# Patient Record
Sex: FEMALE | Race: WHITE | NOT HISPANIC OR LATINO | Employment: OTHER | URBAN - NONMETROPOLITAN AREA
[De-identification: names, ages, dates, MRNs, and addresses within clinical notes are randomized per-mention and may not be internally consistent; named-entity substitution may affect disease eponyms.]

---

## 2017-04-25 ENCOUNTER — OFFICE VISIT (OUTPATIENT)
Dept: FAMILY MEDICINE CLINIC | Facility: CLINIC | Age: 72
End: 2017-04-25

## 2017-04-25 VITALS
WEIGHT: 92 LBS | DIASTOLIC BLOOD PRESSURE: 65 MMHG | TEMPERATURE: 99.4 F | BODY MASS INDEX: 19.31 KG/M2 | HEART RATE: 95 BPM | OXYGEN SATURATION: 93 % | SYSTOLIC BLOOD PRESSURE: 115 MMHG | HEIGHT: 58 IN

## 2017-04-25 DIAGNOSIS — R30.0 DYSURIA: Primary | ICD-10-CM

## 2017-04-25 LAB
BACTERIA UR QL AUTO: ABNORMAL /HPF
BILIRUB BLD-MCNC: NEGATIVE MG/DL
BILIRUB UR QL STRIP: NEGATIVE
CLARITY UR: CLEAR
CLARITY, POC: ABNORMAL
COLOR UR: YELLOW
COLOR UR: YELLOW
GLUCOSE UR STRIP-MCNC: NEGATIVE MG/DL
GLUCOSE UR STRIP-MCNC: NEGATIVE MG/DL
HGB UR QL STRIP.AUTO: NEGATIVE
HYALINE CASTS UR QL AUTO: ABNORMAL /LPF
KETONES UR QL STRIP: NEGATIVE
KETONES UR QL: NEGATIVE
LEUKOCYTE EST, POC: NEGATIVE
LEUKOCYTE ESTERASE UR QL STRIP.AUTO: NEGATIVE
NITRITE UR QL STRIP: NEGATIVE
NITRITE UR-MCNC: NEGATIVE MG/ML
PH UR STRIP.AUTO: 6 [PH] (ref 5–8)
PH UR: 6 [PH] (ref 5–8)
PROT UR QL STRIP: NEGATIVE
PROT UR STRIP-MCNC: NEGATIVE MG/DL
RBC # UR STRIP: NEGATIVE /UL
RBC # UR: ABNORMAL /HPF
REF LAB TEST METHOD: ABNORMAL
SP GR UR STRIP: 1.01 (ref 1–1.03)
SP GR UR: 1.02 (ref 1–1.03)
SQUAMOUS #/AREA URNS HPF: ABNORMAL /HPF
UROBILINOGEN UR QL STRIP: NORMAL
UROBILINOGEN UR QL: NORMAL
WBC UR QL AUTO: ABNORMAL /HPF

## 2017-04-25 PROCEDURE — 99213 OFFICE O/P EST LOW 20 MIN: CPT | Performed by: NURSE PRACTITIONER

## 2017-04-25 PROCEDURE — 81001 URINALYSIS AUTO W/SCOPE: CPT | Performed by: NURSE PRACTITIONER

## 2017-04-25 PROCEDURE — 87086 URINE CULTURE/COLONY COUNT: CPT | Performed by: NURSE PRACTITIONER

## 2017-04-25 PROCEDURE — 81003 URINALYSIS AUTO W/O SCOPE: CPT | Performed by: NURSE PRACTITIONER

## 2017-04-25 RX ORDER — POTASSIUM CHLORIDE 750 MG/1
TABLET, EXTENDED RELEASE ORAL
Refills: 6 | COMMUNITY
Start: 2017-03-21 | End: 2018-01-22

## 2017-04-25 RX ORDER — OMEPRAZOLE 20 MG/1
20 CAPSULE, DELAYED RELEASE ORAL DAILY
Refills: 2 | COMMUNITY
Start: 2017-02-10 | End: 2020-07-21 | Stop reason: SDUPTHER

## 2017-04-25 RX ORDER — AMLODIPINE BESYLATE 5 MG/1
TABLET ORAL
Refills: 6 | COMMUNITY
Start: 2017-03-21 | End: 2017-12-29 | Stop reason: ALTCHOICE

## 2017-04-25 RX ORDER — METOPROLOL SUCCINATE 50 MG/1
50 TABLET, EXTENDED RELEASE ORAL 2 TIMES DAILY
Refills: 6 | COMMUNITY
Start: 2017-03-21 | End: 2021-07-22 | Stop reason: SDUPTHER

## 2017-04-25 RX ORDER — FUROSEMIDE 20 MG/1
20 TABLET ORAL DAILY
Refills: 6 | COMMUNITY
Start: 2017-03-21 | End: 2021-07-22

## 2017-04-25 RX ORDER — SULFAMETHOXAZOLE AND TRIMETHOPRIM 400; 80 MG/1; MG/1
1 TABLET ORAL 2 TIMES DAILY
Qty: 20 TABLET | Refills: 0 | Status: SHIPPED | OUTPATIENT
Start: 2017-04-25 | End: 2018-01-22

## 2017-04-25 RX ORDER — ACETAMINOPHEN 325 MG/1
650 TABLET ORAL EVERY 6 HOURS PRN
Qty: 90 TABLET | Refills: 0 | Status: ON HOLD | OUTPATIENT
Start: 2017-04-25 | End: 2019-07-29

## 2017-04-25 RX ORDER — PHENAZOPYRIDINE HYDROCHLORIDE 100 MG/1
100 TABLET, FILM COATED ORAL 3 TIMES DAILY PRN
Qty: 15 TABLET | Refills: 0 | Status: SHIPPED | OUTPATIENT
Start: 2017-04-25 | End: 2018-01-22

## 2017-04-25 NOTE — PROGRESS NOTES
"Subjective   Ale Gupta is a 71 y.o. female.     Chief Complaint   Patient presents with   • Dysuria       History of Present Illness     Dysuria-ongoing for several weeks.  LBP on the right and burning.  Has been lifting also due to moving some furniture.  Some low grade temp and chills.  Not at goal.     The following portions of the patient's history were reviewed and updated as appropriate: allergies, current medications, past family history, past medical history, past social history, past surgical history and problem list.    Review of Systems   Constitutional: Positive for chills and fever. Negative for appetite change.   Gastrointestinal: Positive for abdominal pain and nausea. Negative for constipation, diarrhea and vomiting.   Genitourinary: Positive for dysuria and flank pain (right sided). Negative for decreased urine volume and difficulty urinating.   Musculoskeletal: Positive for back pain.   All other systems reviewed and are negative.      Objective     /65 (BP Location: Left arm, Patient Position: Standing)  Pulse 95  Temp 99.4 °F (37.4 °C) (Tympanic)   Ht 58\" (147.3 cm)  Wt 92 lb (41.7 kg)  SpO2 93%  BMI 19.23 kg/m2    Physical Exam   Constitutional: She is oriented to person, place, and time. She appears well-developed and well-nourished. No distress.   HENT:   Head: Normocephalic and atraumatic.   Right Ear: External ear normal.   Left Ear: External ear normal.   Nose: Nose normal.   Mouth/Throat: Oropharynx is clear and moist.   Eyes: Conjunctivae, EOM and lids are normal. Pupils are equal, round, and reactive to light. No scleral icterus. Right eye exhibits normal extraocular motion and no nystagmus. Left eye exhibits normal extraocular motion and no nystagmus.   Neck: Normal range of motion. Neck supple. No JVD present. No tracheal tenderness present. No thyromegaly present.   Cardiovascular: Normal rate, regular rhythm, normal heart sounds and intact distal pulses.    No murmur " heard.  Pulmonary/Chest: Effort normal and breath sounds normal. She exhibits no tenderness.   Abdominal: Soft. Bowel sounds are normal. She exhibits no mass. There is no hepatosplenomegaly. There is tenderness in the suprapubic area. There is CVA tenderness (right).   Musculoskeletal: Normal range of motion. She exhibits no edema or tenderness.   Gait normal.   equal bilaterally. No muscular atrophy or flaccidity.   Lymphadenopathy:   No palpable nodes   Neurological: She is alert and oriented to person, place, and time. She has normal strength and normal reflexes. She displays no tremor. No cranial nerve deficit or sensory deficit. She exhibits normal muscle tone. Coordination and gait normal.   Skin: Skin is warm and dry.   Psychiatric: She has a normal mood and affect. Her behavior is normal. Judgment and thought content normal.   Vitals reviewed.      Assessment/Plan     Ale was seen today for dysuria.    Diagnoses and all orders for this visit:    Dysuria  -     POC Urinalysis Dipstick, Automated  -     Urine Culture  -     Urinalysis With Microscopic  -     sulfamethoxazole-trimethoprim (BACTRIM,SEPTRA) 400-80 MG tablet; Take 1 tablet by mouth 2 (Two) Times a Day.  -     phenazopyridine (PYRIDIUM) 100 MG tablet; Take 1 tablet by mouth 3 (Three) Times a Day As Needed for bladder spasms.  -     acetaminophen (TYLENOL) 325 MG tablet; Take 2 tablets by mouth Every 6 (Six) Hours As Needed for Mild Pain (1-3).  -     Urinalysis  -     Urinalysis, Microscopic Only    Urine culture ordered. Will change antibiotics if not susceptible to currently prescribed medication.   Understands disease processes and need for medications.  Understands reasons for urgent and emergent care.  Patient (& family) verbalized agreement for treatment plan.   Instructed to complete all of antibiotics for acute illness.  Increase PO fluids, avoid caffeine.  Do not save meds for later use.  Rest PRN  RTC PRN 3-5 days for worsening or  non resolving symptoms

## 2017-04-28 LAB — BACTERIA SPEC AEROBE CULT: NORMAL

## 2017-12-04 RX ORDER — FLUTICASONE PROPIONATE 50 MCG
SPRAY, SUSPENSION (ML) NASAL
Qty: 16 G | Refills: 4 | Status: SHIPPED | OUTPATIENT
Start: 2017-12-04 | End: 2019-02-06 | Stop reason: SDUPTHER

## 2017-12-14 ENCOUNTER — OFFICE VISIT (OUTPATIENT)
Dept: ORTHOPEDIC SURGERY | Facility: CLINIC | Age: 72
End: 2017-12-14

## 2017-12-14 ENCOUNTER — HOSPITAL ENCOUNTER (OUTPATIENT)
Dept: GENERAL RADIOLOGY | Facility: HOSPITAL | Age: 72
Discharge: HOME | End: 2017-12-14
Attending: ORTHOPAEDIC SURGERY | Admitting: ORTHOPAEDIC SURGERY

## 2017-12-14 VITALS
SYSTOLIC BLOOD PRESSURE: 140 MMHG | DIASTOLIC BLOOD PRESSURE: 49 MMHG | HEIGHT: 55 IN | BODY MASS INDEX: 18.52 KG/M2 | WEIGHT: 80 LBS | HEART RATE: 63 BPM

## 2017-12-14 DIAGNOSIS — S82.841A CLOSED BIMALLEOLAR FRACTURE OF RIGHT ANKLE, INITIAL ENCOUNTER: Primary | ICD-10-CM

## 2017-12-14 PROCEDURE — 73610 X-RAY EXAM OF ANKLE: CPT | Performed by: RADIOLOGY

## 2017-12-14 PROCEDURE — 73610 X-RAY EXAM OF ANKLE: CPT

## 2017-12-14 PROCEDURE — 27808 TREATMENT OF ANKLE FRACTURE: CPT | Performed by: ORTHOPAEDIC SURGERY

## 2017-12-14 RX ORDER — LISINOPRIL 10 MG/1
10 TABLET ORAL 2 TIMES DAILY
Status: ON HOLD | COMMUNITY
End: 2019-07-29

## 2017-12-14 NOTE — PROGRESS NOTES
New Patient Visit        Patient: Ale Gupta  YOB: 1945  Date of encounter: 12/14/2017      History of Present Illness:   Ale Gupta is a 72 y.o.  female who is referred here today by [facility] emergency room for evaluation of acute injury to her right ankle.  She states that a few nights ago she woke up in the evening and as she got up she went to take a step and she fell landing on the right ankle.  She complains of pain and swelling that occurred immediately following the fall.  She also adds that she heard a pop in her ankle when she fell.  She denies paresthesias.  She was placed in a posterior splint will in the emergency room.  She has been getting around in a wheelchair and not bearing any weight onto the leg.      PMH:   There is no problem list on file for this patient.    Past Medical History:   Diagnosis Date   • Congestive heart failure (CHF)    • Hypertension        PSH:  Past Surgical History:   Procedure Laterality Date   • CATARACT EXTRACTION     • HERNIA REPAIR         Allergies:     Allergies   Allergen Reactions   • Codeine        Medications:     Current Outpatient Prescriptions:   •  acetaminophen (TYLENOL) 325 MG tablet, Take 2 tablets by mouth Every 6 (Six) Hours As Needed for Mild Pain (1-3)., Disp: 90 tablet, Rfl: 0  •  amLODIPine (NORVASC) 5 MG tablet, , Disp: , Rfl: 6  •  fluticasone (FLONASE) 50 MCG/ACT nasal spray, DIRECTIONS NOT FOUND - SEE HARD COPY, Disp: 16 g, Rfl: 4  •  furosemide (LASIX) 20 MG tablet, , Disp: , Rfl: 6  •  lisinopril (PRINIVIL,ZESTRIL) 10 MG tablet, Take 10 mg by mouth 2 (Two) Times a Day., Disp: , Rfl:   •  metoprolol succinate XL (TOPROL-XL) 50 MG 24 hr tablet, , Disp: , Rfl: 6  •  omeprazole (priLOSEC) 20 MG capsule, , Disp: , Rfl: 2  •  phenazopyridine (PYRIDIUM) 100 MG tablet, Take 1 tablet by mouth 3 (Three) Times a Day As Needed for bladder spasms., Disp: 15 tablet, Rfl: 0  •  potassium chloride (K-DUR,KLOR-CON) 10 MEQ CR tablet, , Disp: ,  "Rfl: 6  •  SPIRIVA HANDIHALER 18 MCG per inhalation capsule, INHALE CONTENTS OF 1 CAPSULE DAILY, Disp: 30 capsule, Rfl: 5  •  sulfamethoxazole-trimethoprim (BACTRIM,SEPTRA) 400-80 MG tablet, Take 1 tablet by mouth 2 (Two) Times a Day., Disp: 20 tablet, Rfl: 0  •  SYMBICORT 160-4.5 MCG/ACT inhaler, DIRECTIONS NOT FOUND - SEE HARD COPY, Disp: 13686 g, Rfl: 5    Social History:  Social History     Social History   • Marital status: Single     Spouse name: N/A   • Number of children: N/A   • Years of education: N/A     Occupational History   • Not on file.     Social History Main Topics   • Smoking status: Former Smoker   • Smokeless tobacco: Never Used   • Alcohol use No   • Drug use: No   • Sexual activity: Defer     Other Topics Concern   • Not on file     Social History Narrative       Family History:     Family History   Problem Relation Age of Onset   • Heart disease Father    • Heart disease Sister    • Hypertension Sister    • Cancer Brother    • Heart disease Maternal Grandmother    • No Known Problems Maternal Grandfather        Review of Systems:   Review of Systems   HENT: Negative.    Eyes: Negative.    Respiratory: Negative.    Cardiovascular: Negative.    Gastrointestinal: Negative.    Endocrine: Negative.    Genitourinary: Negative.    Musculoskeletal:        Pertinent positives mentioned in HPI   Skin: Negative.    Neurological: Positive for weakness.   Hematological: Bruises/bleeds easily.   Psychiatric/Behavioral: Negative.        Physical Exam: 72 y.o. female  General Appearance:    Alert and oriented x 3, cooperative, in no acute distress                   Vitals:    12/14/17 0820   BP: 140/49   BP Location: Right arm   Patient Position: Sitting   Pulse: 63   Weight: 36.3 kg (80 lb)   Height: 60 cm (23.62\")                Musculoskeletal: Examination of the right ankle reveals moderate swelling and ecchymosis.  She has moderate tenderness along the distal fibula and tibia.  Her range of motion is " not tested secondary to known fracture.  Her neurovascular status is grossly intact.      Radiology:     3 views of the right ankle were reviewed revealing a bimalleolar fracture with minimal displacement.      Assessment    ICD-10-CM ICD-9-CM   1. Closed bimalleolar fracture of right ankle, initial encounter S82.841A 824.4       Plan:    a 72-year-old female with acute injury to her right ankle.  X-rays today reveal a minimally displaced bimalleolar fracture.  Today she was placed in a short leg fiberglass cast.  She and her sister were instructed on cast care.  She was given a prescription for a walker and she can ambulate with toe-touch weightbearing status.  We will work on getting home health out to help her with gait training and ambulation with a walker.  She'll return back in 2 weeks for repeat x-rays in cast.    Written by, Ximena ADAMS, acting as a scribe for Dr. Hwang

## 2017-12-15 ENCOUNTER — TELEPHONE (OUTPATIENT)
Dept: ORTHOPEDIC SURGERY | Facility: CLINIC | Age: 72
End: 2017-12-15

## 2017-12-15 NOTE — TELEPHONE ENCOUNTER
Contacted [facility] phone [phone] fax [phone]. Referral for HH PT for gait training with a walker, patients sister also called this morning requesting a wheel chair,   HH was notified and sister was notified with the Rx for the wheel chair.

## 2017-12-28 DIAGNOSIS — S82.841D CLOSED BIMALLEOLAR FRACTURE OF RIGHT ANKLE WITH ROUTINE HEALING, SUBSEQUENT ENCOUNTER: Primary | ICD-10-CM

## 2017-12-29 ENCOUNTER — OFFICE VISIT (OUTPATIENT)
Dept: ORTHOPEDIC SURGERY | Facility: CLINIC | Age: 72
End: 2017-12-29

## 2017-12-29 ENCOUNTER — HOSPITAL ENCOUNTER (OUTPATIENT)
Dept: GENERAL RADIOLOGY | Facility: HOSPITAL | Age: 72
Discharge: HOME | End: 2017-12-29
Attending: ORTHOPAEDIC SURGERY | Admitting: ORTHOPAEDIC SURGERY

## 2017-12-29 VITALS — HEIGHT: 55 IN | BODY MASS INDEX: 18.52 KG/M2 | WEIGHT: 80 LBS

## 2017-12-29 DIAGNOSIS — S82.841D CLOSED BIMALLEOLAR FRACTURE OF RIGHT ANKLE WITH ROUTINE HEALING, SUBSEQUENT ENCOUNTER: Primary | ICD-10-CM

## 2017-12-29 DIAGNOSIS — S82.841D CLOSED BIMALLEOLAR FRACTURE OF RIGHT ANKLE WITH ROUTINE HEALING, SUBSEQUENT ENCOUNTER: ICD-10-CM

## 2017-12-29 PROBLEM — S82.841A CLOSED BIMALLEOLAR FRACTURE OF RIGHT ANKLE: Status: ACTIVE | Noted: 2017-12-29

## 2017-12-29 PROCEDURE — 73600 X-RAY EXAM OF ANKLE: CPT

## 2017-12-29 PROCEDURE — 99024 POSTOP FOLLOW-UP VISIT: CPT | Performed by: ORTHOPAEDIC SURGERY

## 2017-12-29 PROCEDURE — 73610 X-RAY EXAM OF ANKLE: CPT | Performed by: RADIOLOGY

## 2017-12-29 RX ORDER — POTASSIUM CHLORIDE 750 MG/1
10 TABLET, FILM COATED, EXTENDED RELEASE ORAL DAILY
COMMUNITY
Start: 2017-12-04 | End: 2021-07-22 | Stop reason: SDUPTHER

## 2017-12-29 RX ORDER — AMLODIPINE BESYLATE 10 MG/1
10 TABLET ORAL DAILY
COMMUNITY
Start: 2017-12-22 | End: 2021-07-22

## 2017-12-29 NOTE — PROGRESS NOTES
"Patient: Ale Gupta    YOB: 1945        History of Present Illness: Patient presents for follow-up approximately 2 and half weeks status post a minimally displaced bimalleolar right ankle fracture.  Does states she gets some intermittent problems with pain but no specific complaints this morning.  Denies any paresthesias.  Does use a walker to get around somewhat in the house.    Past Medical History:   Diagnosis Date   • Congestive heart failure (CHF)    • Hypertension         Social History     Social History   • Marital status: Single     Spouse name: N/A   • Number of children: N/A   • Years of education: N/A     Occupational History   • Not on file.     Social History Main Topics   • Smoking status: Former Smoker   • Smokeless tobacco: Never Used   • Alcohol use No   • Drug use: No   • Sexual activity: Defer     Other Topics Concern   • Not on file     Social History Narrative           Physical Exam: 72 y.o. female  General Appearance:    Alert and oriented x 3, cooperative, in no acute distress                   Vitals:    12/29/17 1004   Weight: 36.3 kg (80 lb)   Height: 60 cm (23.62\")          Exam shows the cast is intact.  There is no wear on the bottom of it noted.  Right foot's grossly neurovascularly intact in the toes without significant swelling      Radiology:       X-ray shows no significant change in alignment of the fractures.  The mortise is intact and looks good      Assessment/Plan:    Healing bimalleolar right ankle fracture.  Plan is times to continue touchdown weightbearing with the cast.  I did give her some prescription for Tylenol 325 mg 1 or 2 tablets every 6 hours for pain.  Also some Motrin 200 mg 1 or 2 every 6 hours for pain.  We'll see her back in approximately 4 weeks for x-rays out of cast      Discussion/Summary:        This chart was completed utilizing the dragon speech recognition software.  Grammatical errors, random word insertions, pronoun errors, and " incomplete sentences or occasional consequences of the system due to software limitations, ambient noise, and hardware issues.  Any questions or concerns about the content, text, or information contained within the body of this dictation should be directly addressed to the physician for clarification        This document was signed by Justin Hwang M.D. December 29, 2017 10:28 AM

## 2018-01-22 ENCOUNTER — OFFICE VISIT (OUTPATIENT)
Dept: FAMILY MEDICINE CLINIC | Facility: CLINIC | Age: 73
End: 2018-01-22

## 2018-01-22 VITALS
HEIGHT: 55 IN | TEMPERATURE: 98.4 F | HEART RATE: 81 BPM | SYSTOLIC BLOOD PRESSURE: 108 MMHG | OXYGEN SATURATION: 95 % | DIASTOLIC BLOOD PRESSURE: 60 MMHG

## 2018-01-22 DIAGNOSIS — R68.89 FLU-LIKE SYMPTOMS: ICD-10-CM

## 2018-01-22 DIAGNOSIS — K21.9 GASTROESOPHAGEAL REFLUX DISEASE WITHOUT ESOPHAGITIS: ICD-10-CM

## 2018-01-22 DIAGNOSIS — I10 ESSENTIAL HYPERTENSION: ICD-10-CM

## 2018-01-22 DIAGNOSIS — R32 URINARY INCONTINENCE, UNSPECIFIED TYPE: ICD-10-CM

## 2018-01-22 DIAGNOSIS — J10.1 INFLUENZA A: Primary | ICD-10-CM

## 2018-01-22 DIAGNOSIS — J41.8 MIXED SIMPLE AND MUCOPURULENT CHRONIC BRONCHITIS (HCC): ICD-10-CM

## 2018-01-22 PROBLEM — J30.9 CHRONIC ALLERGIC RHINITIS: Status: ACTIVE | Noted: 2018-01-22

## 2018-01-22 LAB
EXPIRATION DATE: ABNORMAL
FLUAV AG NPH QL: POSITIVE
FLUBV AG NPH QL: NEGATIVE
INTERNAL CONTROL: ABNORMAL
Lab: ABNORMAL

## 2018-01-22 PROCEDURE — 99214 OFFICE O/P EST MOD 30 MIN: CPT | Performed by: PHYSICIAN ASSISTANT

## 2018-01-22 PROCEDURE — 87804 INFLUENZA ASSAY W/OPTIC: CPT | Performed by: PHYSICIAN ASSISTANT

## 2018-01-22 RX ORDER — OSELTAMIVIR PHOSPHATE 75 MG/1
75 CAPSULE ORAL 2 TIMES DAILY
Qty: 10 CAPSULE | Refills: 5 | Status: ON HOLD | OUTPATIENT
Start: 2018-01-22 | End: 2019-07-29

## 2018-01-22 RX ORDER — ALBUTEROL SULFATE 2.5 MG/3ML
2.5 SOLUTION RESPIRATORY (INHALATION) EVERY 4 HOURS PRN
Qty: 120 ML | Refills: 5 | Status: SHIPPED | OUTPATIENT
Start: 2018-01-22 | End: 2019-02-06 | Stop reason: SDUPTHER

## 2018-01-22 RX ORDER — OXYBUTYNIN CHLORIDE 5 MG/1
5 TABLET ORAL 3 TIMES DAILY
Qty: 30 TABLET | Refills: 5 | Status: ON HOLD | OUTPATIENT
Start: 2018-01-22 | End: 2019-07-29

## 2018-01-22 RX ORDER — BUDESONIDE AND FORMOTEROL FUMARATE DIHYDRATE 160; 4.5 UG/1; UG/1
2 AEROSOL RESPIRATORY (INHALATION)
Qty: 10.2 G | Refills: 5 | Status: SHIPPED | OUTPATIENT
Start: 2018-01-22 | End: 2019-02-06 | Stop reason: SDUPTHER

## 2018-01-22 NOTE — PROGRESS NOTES
Subjective   Ale Gupta is a 72 y.o. female.     Chief complaint: Chills    History of Present Illness     Chills-  She is here today with her friend Lea Andrews.  She complains of body aches chills, cough, and fatigue for the past 2 days.    Rapid influenza testing in office today was positive for influenza A.    COPD-not at goal due to acute illness    Urinary leaking-  She complains of leaking urine before she can reach the bathroom intermittently throughout the day.  Onset greater than 6 months ago.    Hypertension-well controlled    Gastroesophageal reflux disease-well controlled    The following portions of the patient's history were reviewed and updated as appropriate: allergies, current medications, past family history, past medical history, past social history, past surgical history and problem list.    Review of Systems   Constitutional: Positive for chills and fatigue. Negative for activity change, appetite change and fever.   HENT: Positive for congestion. Negative for ear pain, sinus pressure and sore throat.    Eyes: Negative for pain and visual disturbance.   Respiratory: Positive for cough. Negative for chest tightness.    Cardiovascular: Negative for chest pain and palpitations.   Gastrointestinal: Negative for abdominal pain, constipation, diarrhea, nausea and vomiting.   Endocrine: Negative for polydipsia and polyuria.   Genitourinary: Negative for dysuria and frequency.   Musculoskeletal: Positive for arthralgias. Negative for back pain and myalgias.   Skin: Negative for color change and rash.   Allergic/Immunologic: Negative for food allergies and immunocompromised state.   Neurological: Negative for dizziness, syncope and headaches.   Hematological: Negative for adenopathy. Does not bruise/bleed easily.   Psychiatric/Behavioral: Negative for hallucinations and suicidal ideas. The patient is not nervous/anxious.        /60 (BP Location: Left arm, Patient Position: Sitting, Cuff Size:  "Adult)  Pulse 81  Temp 98.4 °F (36.9 °C) (Oral)   Ht 60 cm (23.62\")  SpO2 95%    Physical Exam   Constitutional: She is oriented to person, place, and time. She appears well-developed and well-nourished.   HENT:   Head: Normocephalic and atraumatic.   Nose: Nose normal.   Mouth/Throat: Oropharynx is clear and moist.   Eyes: Conjunctivae and EOM are normal. Pupils are equal, round, and reactive to light.   Neck: Normal range of motion. Neck supple. No tracheal deviation present. No thyromegaly present.   Cardiovascular: Normal rate, regular rhythm, normal heart sounds and intact distal pulses.    No murmur heard.  Pulmonary/Chest: Effort normal and breath sounds normal. No respiratory distress. She has no wheezes.   Abdominal: Soft. Bowel sounds are normal. There is no tenderness. There is no guarding.   Musculoskeletal: She exhibits tenderness. She exhibits no edema.   Patient is using walker to ambulate today.  Patient is not bearing weight on right ankle which is in a cast due to recent ankle fracture.   Lymphadenopathy:     She has no cervical adenopathy.   Neurological: She is alert and oriented to person, place, and time.   Skin: Skin is warm and dry. No rash noted.   Psychiatric: She has a normal mood and affect. Her behavior is normal.   Nursing note and vitals reviewed.      Assessment/Plan     Diagnoses and all orders for this visit:    Influenza A  -     oseltamivir (TAMIFLU) 75 MG capsule; Take 1 capsule by mouth 2 (Two) Times a Day.    Flu-like symptoms  -     POCT Influenza A/B    Mixed simple and mucopurulent chronic bronchitis  -     albuterol (PROVENTIL) (2.5 MG/3ML) 0.083% nebulizer solution; Take 2.5 mg by nebulization Every 4 (Four) Hours As Needed for Wheezing.  -     budesonide-formoterol (SYMBICORT) 160-4.5 MCG/ACT inhaler; Inhale 2 puffs 2 (Two) Times a Day.    Urinary incontinence, unspecified type  Comments:  start oxybutynin  Orders:  -     oxybutynin (DITROPAN) 5 MG tablet; Take 1 " tablet by mouth 3 (Three) Times a Day.    Essential hypertension    Gastroesophageal reflux disease without esophagitis                 This document has been electronically signed by:  Ketty Miranda PA-C

## 2018-01-23 DIAGNOSIS — S82.841D CLOSED BIMALLEOLAR FRACTURE OF RIGHT ANKLE WITH ROUTINE HEALING, SUBSEQUENT ENCOUNTER: Primary | ICD-10-CM

## 2018-01-25 ENCOUNTER — OFFICE VISIT (OUTPATIENT)
Dept: ORTHOPEDIC SURGERY | Facility: CLINIC | Age: 73
End: 2018-01-25

## 2018-01-25 ENCOUNTER — HOSPITAL ENCOUNTER (OUTPATIENT)
Dept: GENERAL RADIOLOGY | Facility: HOSPITAL | Age: 73
Discharge: HOME | End: 2018-01-25
Attending: ORTHOPAEDIC SURGERY | Admitting: ORTHOPAEDIC SURGERY

## 2018-01-25 VITALS — BODY MASS INDEX: 18.52 KG/M2 | WEIGHT: 80 LBS | HEIGHT: 55 IN

## 2018-01-25 DIAGNOSIS — S82.841D CLOSED BIMALLEOLAR FRACTURE OF RIGHT ANKLE WITH ROUTINE HEALING, SUBSEQUENT ENCOUNTER: Primary | ICD-10-CM

## 2018-01-25 DIAGNOSIS — S82.841D CLOSED BIMALLEOLAR FRACTURE OF RIGHT ANKLE WITH ROUTINE HEALING, SUBSEQUENT ENCOUNTER: ICD-10-CM

## 2018-01-25 PROCEDURE — 73610 X-RAY EXAM OF ANKLE: CPT

## 2018-01-25 PROCEDURE — 73610 X-RAY EXAM OF ANKLE: CPT | Performed by: RADIOLOGY

## 2018-01-25 PROCEDURE — 99024 POSTOP FOLLOW-UP VISIT: CPT | Performed by: ORTHOPAEDIC SURGERY

## 2018-01-25 RX ORDER — AMLODIPINE BESYLATE 5 MG/1
TABLET ORAL
Status: ON HOLD | COMMUNITY
Start: 2018-01-08 | End: 2019-07-29

## 2018-01-25 NOTE — PROGRESS NOTES
"Patient: Ale Gupta    YOB: 1945        History of Present Illness: Patient is proximal we 6 weeks now status post a minimally displaced bimalleolar fracture of her right ankle.  She presents today for x-rays out of cast.  Complains of generalized pain.  No particular numbness.  States her ankle is stiff    Past Medical History:   Diagnosis Date   • Congestive heart failure (CHF)    • Hypertension         Social History     Social History   • Marital status: Single     Spouse name: N/A   • Number of children: 0   • Years of education: 1     Occupational History   • retired      Social History Main Topics   • Smoking status: Former Smoker   • Smokeless tobacco: Never Used   • Alcohol use No   • Drug use: No   • Sexual activity: Defer     Other Topics Concern   • Not on file     Social History Narrative           Physical Exam: 72 y.o. female  General Appearance:    Alert and oriented x 3, cooperative, in no acute distress                   Vitals:    01/25/18 0957   Weight: 36.3 kg (80 lb)   Height: 60 cm (23.62\")          Exam shows her skin is intact.  There some minimal swelling generalized on her foot and ankle.  Her right foot grossly neurovascular intact.  She is able dorsiflex about 0° and plantarflex about 20°.  She states that sore all over.      Radiology:       X-rays performed today showed no change in alignment of the fractures looks like she may have a little bit of callus forming along her distal fibula.  Some disuse osteoporosis is noted      Assessment/Plan: Healing right ankle bimalleolar fracture.  Plan today is to put her in a cam walking boot.  She can start gentle progressive weightbearing as tolerates with the boot on.  She may remove the boot for general range of motion several times a day.  I told her in about 2 weeks if she is comfortable not she can remove the boot at night for sleeping.  She may also remove it for bathing.  Discussed this also with her niece.  Monitor " she had to be very careful because if she would follow that she could re-break it.  We'll see her back in approximate 45 weeks.  We did discuss possibility of physical therapy but she wants to hold off on that for now.  Ice or heat as needed.  She can take Tylenol for pain.  Again reminded that pain and swelling should be her guide to activity.      Discussion/Summary:        This chart was completed utilizing the dragon speech recognition software.  Grammatical errors, random word insertions, pronoun errors, and incomplete sentences or occasional consequences of the system due to software limitations, ambient noise, and hardware issues.  Any questions or concerns about the content, text, or information contained within the body of this dictation should be directly addressed to the physician for clarification        This document was signed by Justin Hwang M.D. January 25, 2018 10:28 AM

## 2018-02-20 ENCOUNTER — HOSPITAL ENCOUNTER (OUTPATIENT)
Dept: GENERAL RADIOLOGY | Facility: HOSPITAL | Age: 73
Discharge: HOME | End: 2018-02-20
Attending: ORTHOPAEDIC SURGERY | Admitting: ORTHOPAEDIC SURGERY

## 2018-02-20 ENCOUNTER — OFFICE VISIT (OUTPATIENT)
Dept: ORTHOPEDIC SURGERY | Facility: CLINIC | Age: 73
End: 2018-02-20

## 2018-02-20 VITALS — WEIGHT: 80.03 LBS | BODY MASS INDEX: 18.52 KG/M2 | HEIGHT: 55 IN

## 2018-02-20 DIAGNOSIS — S82.841D BIMALLEOLAR FRACTURE, RIGHT, CLOSED, WITH ROUTINE HEALING, SUBSEQUENT ENCOUNTER: Primary | ICD-10-CM

## 2018-02-20 DIAGNOSIS — S82.841D CLOSED BIMALLEOLAR FRACTURE OF RIGHT ANKLE WITH ROUTINE HEALING, SUBSEQUENT ENCOUNTER: Primary | ICD-10-CM

## 2018-02-20 PROCEDURE — 73610 X-RAY EXAM OF ANKLE: CPT | Performed by: RADIOLOGY

## 2018-02-20 PROCEDURE — 73610 X-RAY EXAM OF ANKLE: CPT

## 2018-02-20 PROCEDURE — 99024 POSTOP FOLLOW-UP VISIT: CPT | Performed by: ORTHOPAEDIC SURGERY

## 2018-02-20 NOTE — PROGRESS NOTES
"Patient: Ale Gupta    YOB: 1945        History of Present Illness: About 10 weeks status post bimalleolar right ankle fracture currently using cam walking boot.  Complains of some stiffness and soreness in intermittent swelling.    Past Medical History:   Diagnosis Date   • Congestive heart failure (CHF)    • Hypertension         Social History     Social History   • Marital status: Single     Spouse name: N/A   • Number of children: 0   • Years of education: 1     Occupational History   • retired      Social History Main Topics   • Smoking status: Former Smoker   • Smokeless tobacco: Never Used   • Alcohol use No   • Drug use: No   • Sexual activity: Defer     Other Topics Concern   • Not on file     Social History Narrative           Physical Exam: 72 y.o. female  General Appearance:    Alert and oriented x 3, cooperative, in no acute distress                   Vitals:    02/20/18 1018   Weight: 36.3 kg (80 lb 0.4 oz)   Height: 134.6 cm (53\")          No obvious deformity of the ankle.  Minimal global swelling is noted.  Patient can probably dorsiflex about 5° and plantarflex about 15°.  Grossly neurovascularly intact.  Has mild tenderness both medially and laterally.      Radiology:   X-rays showed no change in alignment of the fractures.  Additional callus is forming.  The mortise is intact      Assessment/Plan: We will order home physical therapy to try to progress the patient's ambulation and to safely wean her out of her cam walking boot.  She does not drive.  We will see her back in approximate 6 weeks for follow-up x-ray and check          Patient's BMI is within normal parameters. No follow-up required.      Discussion/Summary:        This chart was completed utilizing the dragon speech recognition software.  Grammatical errors, random word insertions, pronoun errors, and incomplete sentences or occasional consequences of the system due to software limitations, ambient noise, and " hardware issues.  Any questions or concerns about the content, text, or information contained within the body of this dictation should be directly addressed to the physician for clarification        This document was signed by Justin Hwang M.D. February 20, 2018 10:48 AM

## 2018-02-21 ENCOUNTER — TELEPHONE (OUTPATIENT)
Dept: ORTHOPEDIC SURGERY | Facility: CLINIC | Age: 73
End: 2018-02-21

## 2018-02-21 NOTE — TELEPHONE ENCOUNTER
Called [facility] [phone] to set up physical therapy per patients request. The order as well as a copy of patient's insurance card were faxed to [phone]. [facility] to contact patient at [phone] to schedule appointment.

## 2018-04-11 DIAGNOSIS — M25.571 RIGHT ANKLE PAIN, UNSPECIFIED CHRONICITY: Primary | ICD-10-CM

## 2018-04-12 ENCOUNTER — HOSPITAL ENCOUNTER (OUTPATIENT)
Dept: GENERAL RADIOLOGY | Facility: HOSPITAL | Age: 73
Discharge: HOME | End: 2018-04-12
Attending: ORTHOPAEDIC SURGERY | Admitting: ORTHOPAEDIC SURGERY

## 2018-04-12 ENCOUNTER — OFFICE VISIT (OUTPATIENT)
Dept: ORTHOPEDIC SURGERY | Facility: CLINIC | Age: 73
End: 2018-04-12

## 2018-04-12 DIAGNOSIS — M25.571 RIGHT ANKLE PAIN, UNSPECIFIED CHRONICITY: ICD-10-CM

## 2018-04-12 DIAGNOSIS — S82.841D CLOSED BIMALLEOLAR FRACTURE OF RIGHT ANKLE WITH ROUTINE HEALING, SUBSEQUENT ENCOUNTER: Primary | ICD-10-CM

## 2018-04-12 PROCEDURE — 99213 OFFICE O/P EST LOW 20 MIN: CPT | Performed by: ORTHOPAEDIC SURGERY

## 2018-04-12 PROCEDURE — 73610 X-RAY EXAM OF ANKLE: CPT

## 2018-04-12 PROCEDURE — 73610 X-RAY EXAM OF ANKLE: CPT | Performed by: RADIOLOGY

## 2018-04-12 NOTE — PROGRESS NOTES
Ale Gupta   :1945    Date of encounter:2018        HPI:  Ale Gupta is a 72 y.o. female who returns here today for follow-up of a bimalleolar right ankle fracture.  She is now approximately 4 months post injury.  She states that she did not get started in physical therapy.  She has been back in a regular shoe.  She still complaining of pain and swelling.  She states when she ambulates she has to turn her foot out in order to improve her pain.  She denies paresthesias.    PMH:   Patient Active Problem List   Diagnosis   • Closed bimalleolar fracture of right ankle   • Essential hypertension   • Chronic allergic rhinitis   • Gastroesophageal reflux disease without esophagitis   • Bimalleolar fracture, right, closed, with routine healing, subsequent encounter       Exam:  General Appearance:    72 y.o. female  cooperative, in no acute distress.  Alert and oriented x 3,                 There were no vitals filed for this visit.       There is no height or weight on file to calculate BMI.     Examination of the right ankle reveals no significant swelling or ecchymosis.  She has no tenderness on palpation.  She has good range of motion.  Her neurovascular status is intact.    Radiology:   3 views of the right ankle were reviewed revealing a bimalleolar fracture with evidence of healing.  There is been no change in alignment.    Assessment    ICD-10-CM ICD-9-CM   1. Closed bimalleolar fracture of right ankle with routine healing, subsequent encounter S82.841D V54.19       Plan:   A 72-year-old female 4 months out from a right bimalleolar ankle fracture.  X-rays today reveal no change in alignment with evidence of healing.  She still struggling with complaints of pain and stiffness.  We would like to get her started in formal physical therapy and today we provided her with an order to begin outpatient physical therapy.  Will return back for final assessment and 6 weeks.    Written by, Ximena ADAMS,  acting as a scribe for Dr. Hwang

## 2019-02-06 DIAGNOSIS — J41.8 MIXED SIMPLE AND MUCOPURULENT CHRONIC BRONCHITIS (HCC): ICD-10-CM

## 2019-02-06 RX ORDER — FLUTICASONE PROPIONATE 50 MCG
SPRAY, SUSPENSION (ML) NASAL
Qty: 16 G | Refills: 5 | Status: ON HOLD | OUTPATIENT
Start: 2019-02-06 | End: 2019-07-29

## 2019-02-06 RX ORDER — ALBUTEROL SULFATE 2.5 MG/3ML
SOLUTION RESPIRATORY (INHALATION)
Qty: 360 ML | Refills: 5 | Status: ON HOLD | OUTPATIENT
Start: 2019-02-06 | End: 2019-07-29

## 2019-02-06 RX ORDER — BUDESONIDE AND FORMOTEROL FUMARATE DIHYDRATE 160; 4.5 UG/1; UG/1
AEROSOL RESPIRATORY (INHALATION)
Qty: 10.2 G | Refills: 5 | Status: ON HOLD | OUTPATIENT
Start: 2019-02-06 | End: 2019-07-29

## 2019-05-28 ENCOUNTER — APPOINTMENT (OUTPATIENT)
Dept: CT IMAGING | Facility: HOSPITAL | Age: 74
End: 2019-05-28

## 2019-05-28 ENCOUNTER — HOSPITAL ENCOUNTER (EMERGENCY)
Facility: HOSPITAL | Age: 74
Discharge: HOME | End: 2019-05-28
Attending: FAMILY MEDICINE | Admitting: FAMILY MEDICINE

## 2019-05-28 VITALS
SYSTOLIC BLOOD PRESSURE: 140 MMHG | BODY MASS INDEX: 16.18 KG/M2 | WEIGHT: 75 LBS | HEART RATE: 100 BPM | RESPIRATION RATE: 16 BRPM | HEIGHT: 57 IN | OXYGEN SATURATION: 95 % | DIASTOLIC BLOOD PRESSURE: 55 MMHG | TEMPERATURE: 98.2 F

## 2019-05-28 DIAGNOSIS — E86.0 DEHYDRATION: Primary | ICD-10-CM

## 2019-05-28 LAB
ALBUMIN SERPL-MCNC: 4.52 G/DL (ref 3.5–5.2)
ALBUMIN/GLOB SERPL: 2 G/DL
ALP SERPL-CCNC: 80 U/L (ref 39–117)
ALT SERPL W P-5'-P-CCNC: 5 U/L (ref 1–33)
ANION GAP SERPL CALCULATED.3IONS-SCNC: 13.4 MMOL/L
ANISOCYTOSIS BLD QL: NORMAL
AST SERPL-CCNC: 14 U/L (ref 1–32)
BASOPHILS # BLD AUTO: 0.05 10*3/MM3 (ref 0–0.2)
BASOPHILS NFR BLD AUTO: 0.7 % (ref 0–1.5)
BILIRUB SERPL-MCNC: 0.7 MG/DL (ref 0.2–1.2)
BUN BLD-MCNC: 24 MG/DL (ref 8–23)
BUN/CREAT SERPL: 18.6 (ref 7–25)
CALCIUM SPEC-SCNC: 8.9 MG/DL (ref 8.6–10.5)
CHLORIDE SERPL-SCNC: 100 MMOL/L (ref 98–107)
CK SERPL-CCNC: 42 U/L (ref 20–180)
CO2 SERPL-SCNC: 24.6 MMOL/L (ref 22–29)
CREAT BLD-MCNC: 1.29 MG/DL (ref 0.57–1)
DEPRECATED RDW RBC AUTO: 81.3 FL (ref 37–54)
EOSINOPHIL # BLD AUTO: 0.14 10*3/MM3 (ref 0–0.4)
EOSINOPHIL NFR BLD AUTO: 1.9 % (ref 0.3–6.2)
ERYTHROCYTE [DISTWIDTH] IN BLOOD BY AUTOMATED COUNT: 24.4 % (ref 12.3–15.4)
GFR SERPL CREATININE-BSD FRML MDRD: 41 ML/MIN/1.73
GLOBULIN UR ELPH-MCNC: 2.3 GM/DL
GLUCOSE BLD-MCNC: 93 MG/DL (ref 65–99)
HCT VFR BLD AUTO: 30.3 % (ref 34–46.6)
HGB BLD-MCNC: 9.8 G/DL (ref 12–15.9)
IMM GRANULOCYTES # BLD AUTO: 0.02 10*3/MM3 (ref 0–0.05)
IMM GRANULOCYTES NFR BLD AUTO: 0.3 % (ref 0–0.5)
LYMPHOCYTES # BLD AUTO: 1.57 10*3/MM3 (ref 0.7–3.1)
LYMPHOCYTES NFR BLD AUTO: 20.8 % (ref 19.6–45.3)
MAGNESIUM SERPL-MCNC: 1.9 MG/DL (ref 1.6–2.4)
MCH RBC QN AUTO: 30.4 PG (ref 26.6–33)
MCHC RBC AUTO-ENTMCNC: 32.3 G/DL (ref 31.5–35.7)
MCV RBC AUTO: 94.1 FL (ref 79–97)
MONOCYTES # BLD AUTO: 0.42 10*3/MM3 (ref 0.1–0.9)
MONOCYTES NFR BLD AUTO: 5.6 % (ref 5–12)
NEUTROPHILS # BLD AUTO: 5.35 10*3/MM3 (ref 1.7–7)
NEUTROPHILS NFR BLD AUTO: 70.7 % (ref 42.7–76)
NRBC BLD AUTO-RTO: 0 /100 WBC (ref 0–0.2)
NT-PROBNP SERPL-MCNC: 852.2 PG/ML (ref 5–900)
PLAT MORPH BLD: NORMAL
PLATELET # BLD AUTO: 577 10*3/MM3 (ref 140–450)
PMV BLD AUTO: 10.7 FL (ref 6–12)
POTASSIUM BLD-SCNC: 4.5 MMOL/L (ref 3.5–5.2)
PROT SERPL-MCNC: 6.8 G/DL (ref 6–8.5)
RBC # BLD AUTO: 3.22 10*6/MM3 (ref 3.77–5.28)
SODIUM BLD-SCNC: 138 MMOL/L (ref 136–145)
TROPONIN T SERPL-MCNC: <0.01 NG/ML (ref 0–0.03)
TSH SERPL DL<=0.05 MIU/L-ACNC: 1.77 MIU/ML (ref 0.27–4.2)
WBC NRBC COR # BLD: 7.55 10*3/MM3 (ref 3.4–10.8)

## 2019-05-28 PROCEDURE — 93005 ELECTROCARDIOGRAM TRACING: CPT | Performed by: FAMILY MEDICINE

## 2019-05-28 PROCEDURE — 80053 COMPREHEN METABOLIC PANEL: CPT | Performed by: FAMILY MEDICINE

## 2019-05-28 PROCEDURE — 85007 BL SMEAR W/DIFF WBC COUNT: CPT | Performed by: FAMILY MEDICINE

## 2019-05-28 PROCEDURE — 99284 EMERGENCY DEPT VISIT MOD MDM: CPT

## 2019-05-28 PROCEDURE — 94640 AIRWAY INHALATION TREATMENT: CPT

## 2019-05-28 PROCEDURE — 70450 CT HEAD/BRAIN W/O DYE: CPT

## 2019-05-28 PROCEDURE — 84484 ASSAY OF TROPONIN QUANT: CPT | Performed by: FAMILY MEDICINE

## 2019-05-28 PROCEDURE — 93010 ELECTROCARDIOGRAM REPORT: CPT | Performed by: INTERNAL MEDICINE

## 2019-05-28 PROCEDURE — 83735 ASSAY OF MAGNESIUM: CPT | Performed by: FAMILY MEDICINE

## 2019-05-28 PROCEDURE — 85025 COMPLETE CBC W/AUTO DIFF WBC: CPT | Performed by: FAMILY MEDICINE

## 2019-05-28 PROCEDURE — 70450 CT HEAD/BRAIN W/O DYE: CPT | Performed by: RADIOLOGY

## 2019-05-28 PROCEDURE — 83880 ASSAY OF NATRIURETIC PEPTIDE: CPT | Performed by: FAMILY MEDICINE

## 2019-05-28 PROCEDURE — 84443 ASSAY THYROID STIM HORMONE: CPT | Performed by: FAMILY MEDICINE

## 2019-05-28 PROCEDURE — 82550 ASSAY OF CK (CPK): CPT | Performed by: FAMILY MEDICINE

## 2019-05-28 PROCEDURE — 94799 UNLISTED PULMONARY SVC/PX: CPT

## 2019-05-28 RX ORDER — ALBUTEROL SULFATE 1.25 MG/3ML
1 SOLUTION RESPIRATORY (INHALATION) EVERY 6 HOURS PRN
Qty: 90 VIAL | Refills: 0 | Status: ON HOLD | OUTPATIENT
Start: 2019-05-28 | End: 2019-07-29

## 2019-05-28 RX ORDER — IPRATROPIUM BROMIDE AND ALBUTEROL SULFATE 2.5; .5 MG/3ML; MG/3ML
3 SOLUTION RESPIRATORY (INHALATION) ONCE
Status: COMPLETED | OUTPATIENT
Start: 2019-05-28 | End: 2019-05-28

## 2019-05-28 RX ADMIN — SODIUM CHLORIDE 500 ML: 9 INJECTION, SOLUTION INTRAVENOUS at 18:51

## 2019-05-28 RX ADMIN — IPRATROPIUM BROMIDE AND ALBUTEROL SULFATE 3 ML: .5; 3 SOLUTION RESPIRATORY (INHALATION) at 19:01

## 2019-07-29 ENCOUNTER — HOSPITAL ENCOUNTER (OUTPATIENT)
Facility: HOSPITAL | Age: 74
Discharge: HOME | End: 2019-07-31
Attending: ORTHOPAEDIC SURGERY | Admitting: ORTHOPAEDIC SURGERY

## 2019-07-29 DIAGNOSIS — S52.502A CLOSED FRACTURE OF DISTAL END OF LEFT RADIUS, UNSPECIFIED FRACTURE MORPHOLOGY, INITIAL ENCOUNTER: Primary | ICD-10-CM

## 2019-07-29 LAB
ANION GAP SERPL CALCULATED.3IONS-SCNC: 11.3 MMOL/L (ref 5–15)
ANISOCYTOSIS BLD QL: NORMAL
BASOPHILS # BLD AUTO: 0.04 10*3/MM3 (ref 0–0.2)
BASOPHILS NFR BLD AUTO: 0.4 % (ref 0–1.5)
BUN BLD-MCNC: 17 MG/DL (ref 8–23)
BUN/CREAT SERPL: 20.2 (ref 7–25)
CALCIUM SPEC-SCNC: 9.3 MG/DL (ref 8.6–10.5)
CHLORIDE SERPL-SCNC: 101 MMOL/L (ref 98–107)
CO2 SERPL-SCNC: 28.7 MMOL/L (ref 22–29)
CREAT BLD-MCNC: 0.84 MG/DL (ref 0.57–1)
DEPRECATED RDW RBC AUTO: 87.6 FL (ref 37–54)
ELLIPTOCYTES BLD QL SMEAR: NORMAL
EOSINOPHIL # BLD AUTO: 0.13 10*3/MM3 (ref 0–0.4)
EOSINOPHIL NFR BLD AUTO: 1.2 % (ref 0.3–6.2)
ERYTHROCYTE [DISTWIDTH] IN BLOOD BY AUTOMATED COUNT: 24.4 % (ref 12.3–15.4)
GFR SERPL CREATININE-BSD FRML MDRD: 67 ML/MIN/1.73
GLUCOSE BLD-MCNC: 118 MG/DL (ref 65–99)
HCT VFR BLD AUTO: 33.6 % (ref 34–46.6)
HGB BLD-MCNC: 10.8 G/DL (ref 12–15.9)
HYPOCHROMIA BLD QL: NORMAL
IMM GRANULOCYTES # BLD AUTO: 0.02 10*3/MM3 (ref 0–0.05)
IMM GRANULOCYTES NFR BLD AUTO: 0.2 % (ref 0–0.5)
LARGE PLATELETS: NORMAL
LYMPHOCYTES # BLD AUTO: 2.23 10*3/MM3 (ref 0.7–3.1)
LYMPHOCYTES NFR BLD AUTO: 20.4 % (ref 19.6–45.3)
MACROCYTES BLD QL SMEAR: NORMAL
MCH RBC QN AUTO: 32 PG (ref 26.6–33)
MCHC RBC AUTO-ENTMCNC: 32.1 G/DL (ref 31.5–35.7)
MCV RBC AUTO: 99.4 FL (ref 79–97)
MONOCYTES # BLD AUTO: 0.66 10*3/MM3 (ref 0.1–0.9)
MONOCYTES NFR BLD AUTO: 6 % (ref 5–12)
NEUTROPHILS # BLD AUTO: 7.85 10*3/MM3 (ref 1.7–7)
NEUTROPHILS NFR BLD AUTO: 71.8 % (ref 42.7–76)
NRBC BLD AUTO-RTO: 0 /100 WBC (ref 0–0.2)
PLATELET # BLD AUTO: 558 10*3/MM3 (ref 140–450)
PMV BLD AUTO: 11.4 FL (ref 6–12)
POTASSIUM BLD-SCNC: 3.7 MMOL/L (ref 3.5–5.2)
RBC # BLD AUTO: 3.38 10*6/MM3 (ref 3.77–5.28)
SMALL PLATELETS BLD QL SMEAR: NORMAL
SODIUM BLD-SCNC: 141 MMOL/L (ref 136–145)
WBC NRBC COR # BLD: 10.93 10*3/MM3 (ref 3.4–10.8)

## 2019-07-29 PROCEDURE — 85025 COMPLETE CBC W/AUTO DIFF WBC: CPT | Performed by: ORTHOPAEDIC SURGERY

## 2019-07-29 PROCEDURE — 85007 BL SMEAR W/DIFF WBC COUNT: CPT | Performed by: ORTHOPAEDIC SURGERY

## 2019-07-29 PROCEDURE — 80048 BASIC METABOLIC PNL TOTAL CA: CPT | Performed by: ORTHOPAEDIC SURGERY

## 2019-07-29 RX ORDER — OXYCODONE HYDROCHLORIDE AND ACETAMINOPHEN 5; 325 MG/1; MG/1
1 TABLET ORAL EVERY 4 HOURS PRN
Status: DISCONTINUED | OUTPATIENT
Start: 2019-07-29 | End: 2019-07-31 | Stop reason: HOSPADM

## 2019-07-29 RX ORDER — BUDESONIDE AND FORMOTEROL FUMARATE DIHYDRATE 160; 4.5 UG/1; UG/1
2 AEROSOL RESPIRATORY (INHALATION) DAILY
COMMUNITY
End: 2020-07-21 | Stop reason: SDUPTHER

## 2019-07-29 RX ORDER — MORPHINE SULFATE 2 MG/ML
2 INJECTION, SOLUTION INTRAMUSCULAR; INTRAVENOUS
Status: DISCONTINUED | OUTPATIENT
Start: 2019-07-29 | End: 2019-07-31 | Stop reason: HOSPADM

## 2019-07-29 RX ORDER — LISINOPRIL 20 MG/1
20 TABLET ORAL 2 TIMES DAILY
COMMUNITY
End: 2021-07-22

## 2019-07-30 ENCOUNTER — APPOINTMENT (OUTPATIENT)
Dept: GENERAL RADIOLOGY | Facility: HOSPITAL | Age: 74
End: 2019-07-30

## 2019-07-30 ENCOUNTER — ANESTHESIA (OUTPATIENT)
Dept: PERIOP | Facility: HOSPITAL | Age: 74
End: 2019-07-30

## 2019-07-30 ENCOUNTER — ANESTHESIA EVENT (OUTPATIENT)
Dept: PERIOP | Facility: HOSPITAL | Age: 74
End: 2019-07-30

## 2019-07-30 PROBLEM — S52.502A CLOSED FRACTURE OF LEFT DISTAL RADIUS: Status: ACTIVE | Noted: 2019-07-29

## 2019-07-30 LAB — 25(OH)D3 SERPL-MCNC: 26.1 NG/ML (ref 30–100)

## 2019-07-30 PROCEDURE — A9270 NON-COVERED ITEM OR SERVICE: HCPCS | Performed by: FAMILY MEDICINE

## 2019-07-30 PROCEDURE — 76000 FLUOROSCOPY <1 HR PHYS/QHP: CPT | Performed by: RADIOLOGY

## 2019-07-30 PROCEDURE — 63710000001 POTASSIUM CHLORIDE 10 MEQ TABLET CONTROLLED-RELEASE: Performed by: FAMILY MEDICINE

## 2019-07-30 PROCEDURE — G0378 HOSPITAL OBSERVATION PER HR: HCPCS

## 2019-07-30 PROCEDURE — C1713 ANCHOR/SCREW BN/BN,TIS/BN: HCPCS | Performed by: ORTHOPAEDIC SURGERY

## 2019-07-30 PROCEDURE — 25010000002 FENTANYL CITRATE (PF) 100 MCG/2ML SOLUTION: Performed by: NURSE ANESTHETIST, CERTIFIED REGISTERED

## 2019-07-30 PROCEDURE — 93005 ELECTROCARDIOGRAM TRACING: CPT | Performed by: INTERNAL MEDICINE

## 2019-07-30 PROCEDURE — 25010000002 ONDANSETRON PER 1 MG: Performed by: NURSE ANESTHETIST, CERTIFIED REGISTERED

## 2019-07-30 PROCEDURE — 76000 FLUOROSCOPY <1 HR PHYS/QHP: CPT

## 2019-07-30 PROCEDURE — 25010000002 PROPOFOL 10 MG/ML EMULSION: Performed by: NURSE ANESTHETIST, CERTIFIED REGISTERED

## 2019-07-30 PROCEDURE — A9270 NON-COVERED ITEM OR SERVICE: HCPCS | Performed by: ORTHOPAEDIC SURGERY

## 2019-07-30 PROCEDURE — 63710000001 OXYCODONE-ACETAMINOPHEN 5-325 MG TABLET: Performed by: ORTHOPAEDIC SURGERY

## 2019-07-30 PROCEDURE — 99214 OFFICE O/P EST MOD 30 MIN: CPT | Performed by: INTERNAL MEDICINE

## 2019-07-30 PROCEDURE — 63710000001 LISINOPRIL 10 MG TABLET: Performed by: FAMILY MEDICINE

## 2019-07-30 PROCEDURE — 93010 ELECTROCARDIOGRAM REPORT: CPT | Performed by: INTERNAL MEDICINE

## 2019-07-30 PROCEDURE — 82306 VITAMIN D 25 HYDROXY: CPT | Performed by: INTERNAL MEDICINE

## 2019-07-30 DEVICE — SCRW LK VA W STRDRV 2.4X20MM: Type: IMPLANTABLE DEVICE | Site: WRIST | Status: FUNCTIONAL

## 2019-07-30 DEVICE — SCRW LK VA W STRDRV 2.4X18MM: Type: IMPLANTABLE DEVICE | Site: WRIST | Status: FUNCTIONAL

## 2019-07-30 DEVICE — SCRW LK VA W STRDRV 2.4X14MM: Type: IMPLANTABLE DEVICE | Site: WRIST | Status: FUNCTIONAL

## 2019-07-30 DEVICE — SCRW CORT S/TAP STRDRV 2.7X14MM: Type: IMPLANTABLE DEVICE | Site: WRIST | Status: FUNCTIONAL

## 2019-07-30 DEVICE — IMPLANTABLE DEVICE: Type: IMPLANTABLE DEVICE | Site: WRIST | Status: FUNCTIONAL

## 2019-07-30 DEVICE — SCRW LK VA W STRDRV 2.4X16MM: Type: IMPLANTABLE DEVICE | Site: WRIST | Status: FUNCTIONAL

## 2019-07-30 RX ORDER — POTASSIUM CHLORIDE 750 MG/1
10 TABLET, FILM COATED, EXTENDED RELEASE ORAL DAILY
Status: CANCELLED | OUTPATIENT
Start: 2019-07-30

## 2019-07-30 RX ORDER — METOPROLOL SUCCINATE 25 MG/1
25 TABLET, EXTENDED RELEASE ORAL
Status: DISCONTINUED | OUTPATIENT
Start: 2019-07-30 | End: 2019-07-31 | Stop reason: HOSPADM

## 2019-07-30 RX ORDER — FUROSEMIDE 20 MG/1
20 TABLET ORAL DAILY
Status: CANCELLED | OUTPATIENT
Start: 2019-07-30

## 2019-07-30 RX ORDER — SODIUM CHLORIDE, SODIUM LACTATE, POTASSIUM CHLORIDE, CALCIUM CHLORIDE 600; 310; 30; 20 MG/100ML; MG/100ML; MG/100ML; MG/100ML
125 INJECTION, SOLUTION INTRAVENOUS CONTINUOUS
Status: DISCONTINUED | OUTPATIENT
Start: 2019-07-30 | End: 2019-07-31

## 2019-07-30 RX ORDER — LISINOPRIL 10 MG/1
20 TABLET ORAL 2 TIMES DAILY
Status: CANCELLED | OUTPATIENT
Start: 2019-07-30

## 2019-07-30 RX ORDER — BUPIVACAINE HYDROCHLORIDE 5 MG/ML
INJECTION, SOLUTION PERINEURAL AS NEEDED
Status: DISCONTINUED | OUTPATIENT
Start: 2019-07-30 | End: 2019-07-30 | Stop reason: HOSPADM

## 2019-07-30 RX ORDER — MEPERIDINE HYDROCHLORIDE 25 MG/ML
12.5 INJECTION INTRAMUSCULAR; INTRAVENOUS; SUBCUTANEOUS
Status: DISCONTINUED | OUTPATIENT
Start: 2019-07-30 | End: 2019-07-30 | Stop reason: HOSPADM

## 2019-07-30 RX ORDER — AMLODIPINE BESYLATE 10 MG/1
10 TABLET ORAL DAILY
Status: DISCONTINUED | OUTPATIENT
Start: 2019-07-30 | End: 2019-07-31 | Stop reason: HOSPADM

## 2019-07-30 RX ORDER — SODIUM CHLORIDE 0.9 % (FLUSH) 0.9 %
3 SYRINGE (ML) INJECTION EVERY 12 HOURS SCHEDULED
Status: DISCONTINUED | OUTPATIENT
Start: 2019-07-30 | End: 2019-07-30 | Stop reason: HOSPADM

## 2019-07-30 RX ORDER — FUROSEMIDE 20 MG/1
20 TABLET ORAL DAILY
Status: DISCONTINUED | OUTPATIENT
Start: 2019-07-30 | End: 2019-07-31 | Stop reason: HOSPADM

## 2019-07-30 RX ORDER — LIDOCAINE HYDROCHLORIDE 20 MG/ML
INJECTION, SOLUTION INFILTRATION; PERINEURAL AS NEEDED
Status: DISCONTINUED | OUTPATIENT
Start: 2019-07-30 | End: 2019-07-30 | Stop reason: SURG

## 2019-07-30 RX ORDER — POTASSIUM CHLORIDE 750 MG/1
10 TABLET, FILM COATED, EXTENDED RELEASE ORAL DAILY
Status: DISCONTINUED | OUTPATIENT
Start: 2019-07-30 | End: 2019-07-31 | Stop reason: HOSPADM

## 2019-07-30 RX ORDER — IPRATROPIUM BROMIDE AND ALBUTEROL SULFATE 2.5; .5 MG/3ML; MG/3ML
3 SOLUTION RESPIRATORY (INHALATION) ONCE AS NEEDED
Status: DISCONTINUED | OUTPATIENT
Start: 2019-07-30 | End: 2019-07-30 | Stop reason: HOSPADM

## 2019-07-30 RX ORDER — BUDESONIDE AND FORMOTEROL FUMARATE DIHYDRATE 160; 4.5 UG/1; UG/1
2 AEROSOL RESPIRATORY (INHALATION) DAILY
Status: DISCONTINUED | OUTPATIENT
Start: 2019-07-30 | End: 2019-07-31 | Stop reason: HOSPADM

## 2019-07-30 RX ORDER — FENTANYL CITRATE 50 UG/ML
50 INJECTION, SOLUTION INTRAMUSCULAR; INTRAVENOUS
Status: DISCONTINUED | OUTPATIENT
Start: 2019-07-30 | End: 2019-07-30 | Stop reason: HOSPADM

## 2019-07-30 RX ORDER — FAMOTIDINE 10 MG/ML
INJECTION, SOLUTION INTRAVENOUS AS NEEDED
Status: DISCONTINUED | OUTPATIENT
Start: 2019-07-30 | End: 2019-07-30 | Stop reason: SURG

## 2019-07-30 RX ORDER — AMLODIPINE BESYLATE 10 MG/1
10 TABLET ORAL DAILY
Status: CANCELLED | OUTPATIENT
Start: 2019-07-30

## 2019-07-30 RX ORDER — FENTANYL CITRATE 50 UG/ML
INJECTION, SOLUTION INTRAMUSCULAR; INTRAVENOUS AS NEEDED
Status: DISCONTINUED | OUTPATIENT
Start: 2019-07-30 | End: 2019-07-30 | Stop reason: SURG

## 2019-07-30 RX ORDER — SODIUM CHLORIDE 0.9 % (FLUSH) 0.9 %
3-10 SYRINGE (ML) INJECTION AS NEEDED
Status: DISCONTINUED | OUTPATIENT
Start: 2019-07-30 | End: 2019-07-30 | Stop reason: HOSPADM

## 2019-07-30 RX ORDER — PANTOPRAZOLE SODIUM 40 MG/1
40 TABLET, DELAYED RELEASE ORAL EVERY MORNING
Status: DISCONTINUED | OUTPATIENT
Start: 2019-07-30 | End: 2019-07-31 | Stop reason: HOSPADM

## 2019-07-30 RX ORDER — ONDANSETRON 2 MG/ML
INJECTION INTRAMUSCULAR; INTRAVENOUS AS NEEDED
Status: DISCONTINUED | OUTPATIENT
Start: 2019-07-30 | End: 2019-07-30 | Stop reason: SURG

## 2019-07-30 RX ORDER — ONDANSETRON 2 MG/ML
4 INJECTION INTRAMUSCULAR; INTRAVENOUS AS NEEDED
Status: DISCONTINUED | OUTPATIENT
Start: 2019-07-30 | End: 2019-07-30 | Stop reason: HOSPADM

## 2019-07-30 RX ORDER — PROPOFOL 10 MG/ML
VIAL (ML) INTRAVENOUS AS NEEDED
Status: DISCONTINUED | OUTPATIENT
Start: 2019-07-30 | End: 2019-07-30 | Stop reason: SURG

## 2019-07-30 RX ORDER — LISINOPRIL 10 MG/1
20 TABLET ORAL 2 TIMES DAILY
Status: DISCONTINUED | OUTPATIENT
Start: 2019-07-30 | End: 2019-07-31 | Stop reason: HOSPADM

## 2019-07-30 RX ADMIN — LISINOPRIL 20 MG: 10 TABLET ORAL at 21:30

## 2019-07-30 RX ADMIN — FENTANYL CITRATE 25 MCG: 50 INJECTION INTRAMUSCULAR; INTRAVENOUS at 13:49

## 2019-07-30 RX ADMIN — SODIUM CHLORIDE, POTASSIUM CHLORIDE, SODIUM LACTATE AND CALCIUM CHLORIDE 125 ML/HR: 600; 310; 30; 20 INJECTION, SOLUTION INTRAVENOUS at 21:30

## 2019-07-30 RX ADMIN — FENTANYL CITRATE 25 MCG: 50 INJECTION INTRAMUSCULAR; INTRAVENOUS at 14:17

## 2019-07-30 RX ADMIN — FENTANYL CITRATE 25 MCG: 50 INJECTION INTRAMUSCULAR; INTRAVENOUS at 15:29

## 2019-07-30 RX ADMIN — FENTANYL CITRATE 25 MCG: 50 INJECTION INTRAMUSCULAR; INTRAVENOUS at 13:56

## 2019-07-30 RX ADMIN — EPHEDRINE SULFATE 10 MG: 50 INJECTION INTRAMUSCULAR; INTRAVENOUS; SUBCUTANEOUS at 13:43

## 2019-07-30 RX ADMIN — OXYCODONE HYDROCHLORIDE AND ACETAMINOPHEN 1 TABLET: 5; 325 TABLET ORAL at 18:39

## 2019-07-30 RX ADMIN — PROPOFOL 110 MG: 10 INJECTION, EMULSION INTRAVENOUS at 13:35

## 2019-07-30 RX ADMIN — FAMOTIDINE 20 MG: 10 INJECTION, SOLUTION INTRAVENOUS at 13:35

## 2019-07-30 RX ADMIN — ONDANSETRON 4 MG: 2 INJECTION, SOLUTION INTRAMUSCULAR; INTRAVENOUS at 13:35

## 2019-07-30 RX ADMIN — POTASSIUM CHLORIDE 10 MEQ: 750 TABLET, FILM COATED, EXTENDED RELEASE ORAL at 21:30

## 2019-07-30 RX ADMIN — CEFAZOLIN 2 G: 1 INJECTION, POWDER, FOR SOLUTION INTRAMUSCULAR; INTRAVENOUS; PARENTERAL at 13:30

## 2019-07-30 RX ADMIN — LIDOCAINE HYDROCHLORIDE 40 MG: 20 INJECTION, SOLUTION INFILTRATION; PERINEURAL at 13:35

## 2019-07-30 RX ADMIN — OXYCODONE HYDROCHLORIDE AND ACETAMINOPHEN 1 TABLET: 5; 325 TABLET ORAL at 03:31

## 2019-07-30 RX ADMIN — OXYCODONE HYDROCHLORIDE AND ACETAMINOPHEN 1 TABLET: 5; 325 TABLET ORAL at 23:57

## 2019-07-30 RX ADMIN — FENTANYL CITRATE 25 MCG: 50 INJECTION INTRAMUSCULAR; INTRAVENOUS at 14:05

## 2019-07-30 RX ADMIN — SODIUM CHLORIDE, POTASSIUM CHLORIDE, SODIUM LACTATE AND CALCIUM CHLORIDE 125 ML/HR: 600; 310; 30; 20 INJECTION, SOLUTION INTRAVENOUS at 11:07

## 2019-07-30 NOTE — ANESTHESIA PREPROCEDURE EVALUATION
Anesthesia Evaluation     Patient summary reviewed and Nursing notes reviewed   NPO Solid Status: > 8 hours  NPO Liquid Status: > 8 hours           Airway   Mallampati: II  TM distance: >3 FB  Neck ROM: full  no difficulty expected  Dental    (+) poor dentition    Pulmonary - negative pulmonary ROS   (+) decreased breath sounds,   Cardiovascular - negative cardio ROS    Rhythm: regular  Rate: normal        Neuro/Psych- negative ROS  GI/Hepatic/Renal/Endo - negative ROS     Musculoskeletal     (+) chronic pain,   Abdominal    Substance History - negative use     OB/GYN negative ob/gyn ROS         Other - negative ROS                       Anesthesia Plan    ASA 3     general     intravenous induction   Anesthetic plan, all risks, benefits, and alternatives have been provided, discussed and informed consent has been obtained with: patient.  Use of blood products discussed with patient .   Plan discussed with CRNA.       Patient given water to drink and pretzels. BS was 229. Patient placed on bedpan, no BM returned. Patient has excoriation to the genital area with moist and red and itchy.      Carmina Coleman RN  10/27/17 1640       Carmina Coleman RN  10/27/17 8924

## 2019-07-30 NOTE — ANESTHESIA POSTPROCEDURE EVALUATION
Patient: Ale Gupta    Procedure Summary     Date:  07/30/19 Room / Location:  [facility] OR 01 /  [facility] OR    Anesthesia Start:  1325 Anesthesia Stop:  1445    Procedure:  ULNA/RADIUS OPEN REDUCTION INTERNAL FIXATION, radius only (Left Hand) Diagnosis:       Closed fracture of distal end of left radius, unspecified fracture morphology, initial encounter      (Closed fracture of distal end of left radius, unspecified fracture morphology, initial encounter [S52.502A])    Surgeon:  Maverick Stevenson MD Provider:  Nabil Spring MD    Anesthesia Type:  general ASA Status:  3          Anesthesia Type: general  Last vitals  BP   125/56 (07/30/19 1617)   Temp   97.8 °F (36.6 °C) (07/30/19 1617)   Pulse   87 (07/30/19 1617)   Resp   14 (07/30/19 1617)     SpO2   96 % (07/30/19 1617)     Post Anesthesia Care and Evaluation    Patient location during evaluation: PHASE II  Patient participation: complete - patient participated  Level of consciousness: awake and alert  Pain score: 1  Pain management: adequate  Airway patency: patent  Anesthetic complications: No anesthetic complications  PONV Status: controlled  Cardiovascular status: acceptable  Respiratory status: acceptable  Hydration status: acceptable

## 2019-07-31 ENCOUNTER — APPOINTMENT (OUTPATIENT)
Dept: CARDIOLOGY | Facility: HOSPITAL | Age: 74
End: 2019-07-31

## 2019-07-31 VITALS
HEART RATE: 97 BPM | SYSTOLIC BLOOD PRESSURE: 136 MMHG | BODY MASS INDEX: 14.14 KG/M2 | TEMPERATURE: 98.4 F | DIASTOLIC BLOOD PRESSURE: 43 MMHG | HEIGHT: 59 IN | OXYGEN SATURATION: 97 % | WEIGHT: 70.13 LBS | RESPIRATION RATE: 16 BRPM

## 2019-07-31 LAB
BH CV ECHO MEAS - % IVS THICK: 71.2 %
BH CV ECHO MEAS - % LVPW THICK: 81.8 %
BH CV ECHO MEAS - ACS: 1.7 CM
BH CV ECHO MEAS - AO ROOT AREA (BSA CORRECTED): 2.4
BH CV ECHO MEAS - AO ROOT AREA: 5.7 CM^2
BH CV ECHO MEAS - AO ROOT DIAM: 2.7 CM
BH CV ECHO MEAS - BSA(HAYCOCK): 1.1 M^2
BH CV ECHO MEAS - BSA: 1.1 M^2
BH CV ECHO MEAS - BZI_BMI: 12.5 KILOGRAMS/M^2
BH CV ECHO MEAS - BZI_METRIC_HEIGHT: 149.9 CM
BH CV ECHO MEAS - BZI_METRIC_WEIGHT: 28.1 KG
BH CV ECHO MEAS - EDV(CUBED): 89.9 ML
BH CV ECHO MEAS - EDV(MOD-SP4): 38 ML
BH CV ECHO MEAS - EDV(TEICH): 91.5 ML
BH CV ECHO MEAS - EF(CUBED): 84.8 %
BH CV ECHO MEAS - EF(MOD-SP4): 81.6 %
BH CV ECHO MEAS - EF(TEICH): 78.2 %
BH CV ECHO MEAS - ESV(CUBED): 13.7 ML
BH CV ECHO MEAS - ESV(MOD-SP4): 7 ML
BH CV ECHO MEAS - ESV(TEICH): 20 ML
BH CV ECHO MEAS - FS: 46.7 %
BH CV ECHO MEAS - IVS/LVPW: 1
BH CV ECHO MEAS - IVSD: 0.85 CM
BH CV ECHO MEAS - IVSS: 1.5 CM
BH CV ECHO MEAS - LA DIMENSION: 3.8 CM
BH CV ECHO MEAS - LA/AO: 1.4
BH CV ECHO MEAS - LV DIASTOLIC VOL/BSA (35-75): 33.9 ML/M^2
BH CV ECHO MEAS - LV MASS(C)D: 121.6 GRAMS
BH CV ECHO MEAS - LV MASS(C)DI: 108.5 GRAMS/M^2
BH CV ECHO MEAS - LV MASS(C)S: 118.8 GRAMS
BH CV ECHO MEAS - LV MASS(C)SI: 106 GRAMS/M^2
BH CV ECHO MEAS - LV SYSTOLIC VOL/BSA (12-30): 6.2 ML/M^2
BH CV ECHO MEAS - LVIDD: 4.5 CM
BH CV ECHO MEAS - LVIDS: 2.4 CM
BH CV ECHO MEAS - LVLD AP4: 5.5 CM
BH CV ECHO MEAS - LVLS AP4: 4.7 CM
BH CV ECHO MEAS - LVOT AREA (M): 2 CM^2
BH CV ECHO MEAS - LVOT AREA: 2 CM^2
BH CV ECHO MEAS - LVOT DIAM: 1.6 CM
BH CV ECHO MEAS - LVPWD: 0.85 CM
BH CV ECHO MEAS - LVPWS: 1.5 CM
BH CV ECHO MEAS - MV A MAX VEL: 117 CM/SEC
BH CV ECHO MEAS - MV E MAX VEL: 97.7 CM/SEC
BH CV ECHO MEAS - MV E/A: 0.84
BH CV ECHO MEAS - RAP SYSTOLE: 10 MMHG
BH CV ECHO MEAS - RVDD: 1.7 CM
BH CV ECHO MEAS - RVSP: 57.6 MMHG
BH CV ECHO MEAS - SI(CUBED): 68 ML/M^2
BH CV ECHO MEAS - SI(MOD-SP4): 27.7 ML/M^2
BH CV ECHO MEAS - SI(TEICH): 63.8 ML/M^2
BH CV ECHO MEAS - SV(CUBED): 76.3 ML
BH CV ECHO MEAS - SV(MOD-SP4): 31 ML
BH CV ECHO MEAS - SV(TEICH): 71.5 ML
BH CV ECHO MEAS - TR MAX VEL: 345 CM/SEC
INR PPP: 1.31 (ref 0.9–1.1)
MAXIMAL PREDICTED HEART RATE: 149 BPM
PROTHROMBIN TIME: 17 SECONDS (ref 11–15.4)
STRESS TARGET HR: 127 BPM

## 2019-07-31 PROCEDURE — 63710000001 METOPROLOL SUCCINATE XL 25 MG TABLET SUSTAINED-RELEASE 24 HOUR: Performed by: INTERNAL MEDICINE

## 2019-07-31 PROCEDURE — 63710000001 OXYCODONE-ACETAMINOPHEN 5-325 MG TABLET: Performed by: ORTHOPAEDIC SURGERY

## 2019-07-31 PROCEDURE — 94640 AIRWAY INHALATION TREATMENT: CPT

## 2019-07-31 PROCEDURE — 94799 UNLISTED PULMONARY SVC/PX: CPT

## 2019-07-31 PROCEDURE — A9270 NON-COVERED ITEM OR SERVICE: HCPCS | Performed by: FAMILY MEDICINE

## 2019-07-31 PROCEDURE — A9270 NON-COVERED ITEM OR SERVICE: HCPCS | Performed by: ORTHOPAEDIC SURGERY

## 2019-07-31 PROCEDURE — 63710000001 PANTOPRAZOLE 40 MG TABLET DELAYED-RELEASE: Performed by: INTERNAL MEDICINE

## 2019-07-31 PROCEDURE — 63710000001 BUDESONIDE-FORMOTEROL 160-4.5 MCG/ACT AEROSOL 6 G INHALER: Performed by: INTERNAL MEDICINE

## 2019-07-31 PROCEDURE — 63710000001 FUROSEMIDE 20 MG TABLET: Performed by: FAMILY MEDICINE

## 2019-07-31 PROCEDURE — A9270 NON-COVERED ITEM OR SERVICE: HCPCS | Performed by: INTERNAL MEDICINE

## 2019-07-31 PROCEDURE — G0378 HOSPITAL OBSERVATION PER HR: HCPCS

## 2019-07-31 PROCEDURE — 93306 TTE W/DOPPLER COMPLETE: CPT

## 2019-07-31 PROCEDURE — 63710000001 POTASSIUM CHLORIDE 10 MEQ TABLET CONTROLLED-RELEASE: Performed by: FAMILY MEDICINE

## 2019-07-31 PROCEDURE — 85610 PROTHROMBIN TIME: CPT | Performed by: INTERNAL MEDICINE

## 2019-07-31 PROCEDURE — 63710000001 LISINOPRIL 10 MG TABLET: Performed by: FAMILY MEDICINE

## 2019-07-31 PROCEDURE — 99213 OFFICE O/P EST LOW 20 MIN: CPT | Performed by: FAMILY MEDICINE

## 2019-07-31 RX ORDER — OXYCODONE HYDROCHLORIDE AND ACETAMINOPHEN 5; 325 MG/1; MG/1
1 TABLET ORAL EVERY 4 HOURS PRN
Qty: 20 TABLET | Refills: 0 | Status: SHIPPED | OUTPATIENT
Start: 2019-07-31 | End: 2019-08-08

## 2019-07-31 RX ADMIN — POTASSIUM CHLORIDE 10 MEQ: 750 TABLET, FILM COATED, EXTENDED RELEASE ORAL at 08:37

## 2019-07-31 RX ADMIN — METOPROLOL SUCCINATE 25 MG: 25 TABLET, FILM COATED, EXTENDED RELEASE ORAL at 08:37

## 2019-07-31 RX ADMIN — BUDESONIDE AND FORMOTEROL FUMARATE DIHYDRATE 2 PUFF: 160; 4.5 AEROSOL RESPIRATORY (INHALATION) at 07:23

## 2019-07-31 RX ADMIN — FUROSEMIDE 20 MG: 20 TABLET ORAL at 08:37

## 2019-07-31 RX ADMIN — LISINOPRIL 20 MG: 10 TABLET ORAL at 08:37

## 2019-07-31 RX ADMIN — SODIUM CHLORIDE, POTASSIUM CHLORIDE, SODIUM LACTATE AND CALCIUM CHLORIDE 125 ML/HR: 600; 310; 30; 20 INJECTION, SOLUTION INTRAVENOUS at 08:37

## 2019-07-31 RX ADMIN — PANTOPRAZOLE SODIUM 40 MG: 40 TABLET, DELAYED RELEASE ORAL at 06:24

## 2019-07-31 RX ADMIN — OXYCODONE HYDROCHLORIDE AND ACETAMINOPHEN 1 TABLET: 5; 325 TABLET ORAL at 12:05

## 2019-07-31 RX ADMIN — OXYCODONE HYDROCHLORIDE AND ACETAMINOPHEN 1 TABLET: 5; 325 TABLET ORAL at 08:37

## 2019-08-01 ENCOUNTER — TRANSITIONAL CARE MANAGEMENT TELEPHONE ENCOUNTER (OUTPATIENT)
Dept: FAMILY MEDICINE CLINIC | Facility: CLINIC | Age: 74
End: 2019-08-01

## 2019-08-02 NOTE — OUTREACH NOTE
JESSICA call completed.  Please refer to TCM call flowsheet for call documentation.  Patient's sister reports that she is doing well.  Patient is currently not home.  Ms. Andrews reports that patient's pain is controlled.  She states that patient has upcoming appointment and will be there.  She denies questions regarding meds and d/c instructions from hospital.

## 2019-08-07 ENCOUNTER — OFFICE VISIT (OUTPATIENT)
Dept: FAMILY MEDICINE CLINIC | Facility: CLINIC | Age: 74
End: 2019-08-07

## 2019-08-07 VITALS
BODY MASS INDEX: 14.72 KG/M2 | HEIGHT: 59 IN | DIASTOLIC BLOOD PRESSURE: 78 MMHG | WEIGHT: 73 LBS | TEMPERATURE: 97.9 F | HEART RATE: 88 BPM | OXYGEN SATURATION: 95 % | SYSTOLIC BLOOD PRESSURE: 128 MMHG

## 2019-08-07 DIAGNOSIS — G25.0 ESSENTIAL TREMOR: ICD-10-CM

## 2019-08-07 DIAGNOSIS — J44.9 CHRONIC OBSTRUCTIVE PULMONARY DISEASE, UNSPECIFIED COPD TYPE (HCC): ICD-10-CM

## 2019-08-07 DIAGNOSIS — Z09 ENCOUNTER FOR EXAMINATION FOLLOWING TREATMENT AT HOSPITAL: Primary | ICD-10-CM

## 2019-08-07 DIAGNOSIS — S52.592D OTHER CLOSED FRACTURE OF DISTAL END OF LEFT RADIUS WITH ROUTINE HEALING, SUBSEQUENT ENCOUNTER: ICD-10-CM

## 2019-08-07 PROCEDURE — 99495 TRANSJ CARE MGMT MOD F2F 14D: CPT | Performed by: NURSE PRACTITIONER

## 2019-08-07 RX ORDER — PRIMIDONE 50 MG/1
50 TABLET ORAL 2 TIMES DAILY
Qty: 60 TABLET | Refills: 0 | Status: SHIPPED | OUTPATIENT
Start: 2019-08-07 | End: 2020-07-21 | Stop reason: SDUPTHER

## 2019-08-07 RX ORDER — ALBUTEROL SULFATE 2.5 MG/3ML
2.5 SOLUTION RESPIRATORY (INHALATION) EVERY 4 HOURS PRN
Qty: 180 VIAL | Refills: 5 | Status: SHIPPED | OUTPATIENT
Start: 2019-08-07 | End: 2020-07-21 | Stop reason: SDUPTHER

## 2019-08-07 RX ORDER — NEBULIZER ACCESSORIES
1 KIT MISCELLANEOUS TAKE AS DIRECTED
Qty: 1 EACH | Refills: 0 | Status: ON HOLD | OUTPATIENT
Start: 2019-08-07 | End: 2021-07-30

## 2019-08-07 NOTE — PROGRESS NOTES
"Transitional Care Follow Up Visit  Subjective     Ale Gupta is a 73 y.o. female who presents for a transitional care management visit.    Within 48 business hours after discharge  transition management staff contacted her via telephone to coordinate her care and needs.      I reviewed and discussed the details of that call along with the discharge summary, hospital problems, inpatient lab results, inpatient diagnostic studies, and consultation reports with Ale.     Current outpatient and discharge medications have been reconciled for the patient.    Date of TCM Phone Call 8/2/2019   [facility]   Date of Discharge 7/31/2019   Discharge Disposition Home or Self Care     Risk for Readmission (LACE) Score: 3 (7/31/2019  6:00 AM)      History of Present Illness     Course During Hospital Stay:  Fall on 07/29/2019 She states she tripped over a concrete piece causing her to fall on the arm. She was seen at [facility] and transferred to [facility] for evalution. She was noted to have a radial fracture requiring surgery for care.  Patient underwent ORIF distal radius per Dr. Stevenson on 7-30-19.       She is presently in a sling with support dressing in place.  She does have mild hand edema.  She is propping at home.  Reports \"aching like a tooth ache\".  She will have an upcoming follow-up with orthopedic.    HTN-stable.  Is taking Norvasc 10 mg and Metoprol 50 mg.  She also is taking Lisinopril 20 mg.  She denies any negative side effects.  She is at times feeling palpitations but reports that is not a new onset.  Tremors-has noted tremor of bilateral hands for \"a while\".   She denies any family history.  She reports occurs without any voluntary movement.  She has also noted some head tremors.    Right inguinal hernia-has noted a \"lump in her groin\" for approx 1 month.  She reports that she had been doing \"heavy lifting\" prior to the onset of the area.  She denies any pain or bowel changes.  No changes in " urination.  Ongoing       The following portions of the patient's history were reviewed and updated as appropriate: allergies, current medications, past family history, past medical history, past social history, past surgical history and problem list.    Review of Systems   Constitutional: Negative for appetite change, fatigue and unexpected weight change.   HENT: Negative for congestion, ear pain, nosebleeds, postnasal drip, rhinorrhea, sore throat, trouble swallowing and voice change.    Eyes: Negative for pain and visual disturbance.   Respiratory: Positive for cough and shortness of breath. Negative for wheezing.    Cardiovascular: Positive for palpitations. Negative for chest pain.   Gastrointestinal: Negative for abdominal pain, blood in stool, constipation and diarrhea.   Endocrine: Negative for cold intolerance and polydipsia.   Genitourinary: Negative for difficulty urinating, flank pain and hematuria.   Musculoskeletal: Negative for arthralgias, back pain, gait problem, joint swelling and myalgias.   Skin: Negative for color change and rash.   Allergic/Immunologic: Negative.    Neurological: Positive for dizziness and tremors. Negative for syncope, numbness and headaches.   Hematological: Negative.    Psychiatric/Behavioral: Negative for sleep disturbance and suicidal ideas.   All other systems reviewed and are negative.      Objective   Physical Exam   Constitutional: She is oriented to person, place, and time. She appears well-developed and well-nourished. No distress.   HENT:   Head: Normocephalic and atraumatic.   Right Ear: Hearing, tympanic membrane, external ear and ear canal normal.   Left Ear: Hearing, tympanic membrane, external ear and ear canal normal.   Nose: No mucosal edema or rhinorrhea.   Mouth/Throat: Oropharynx is clear and moist and mucous membranes are normal.   Eyes: Conjunctivae, EOM and lids are normal. Pupils are equal, round, and reactive to light. No scleral icterus. Right eye  exhibits normal extraocular motion and no nystagmus. Left eye exhibits normal extraocular motion and no nystagmus.   Neck: Normal range of motion. Neck supple. No JVD present. No tracheal tenderness present. Carotid bruit is not present. No thyromegaly present.   Cardiovascular: Normal rate, regular rhythm, S1 normal, S2 normal, normal heart sounds and intact distal pulses.   No murmur heard.  Pulmonary/Chest: Effort normal and breath sounds normal. She exhibits no tenderness.   Abdominal: Soft. Bowel sounds are normal. She exhibits no mass. There is no hepatosplenomegaly. There is no tenderness. A hernia (right inguinal-soft easily reduces) is present.   Musculoskeletal: Normal range of motion. She exhibits no edema or tenderness.    equal bilaterally. No muscular atrophy or flaccidity.  Gait slow, cautious.  Patient with short stature.  Kyphotic spine.  Left arm in sling.   Lymphadenopathy:     She has no cervical adenopathy.        Right cervical: No superficial cervical adenopathy present.       Left cervical: No superficial cervical adenopathy present.   Neurological: She is alert and oriented to person, place, and time. She has normal reflexes. She displays tremor (head and BUE). No cranial nerve deficit or sensory deficit. Coordination and gait normal.   Skin: Skin is warm and dry. Capillary refill takes less than 2 seconds. She is not diaphoretic. No cyanosis. No pallor. Nails show no clubbing.   Ecchymosis on right upper extremity with various stages of healing consistent with IV and venipuncture sticks.   Psychiatric: She has a normal mood and affect. Her speech is normal and behavior is normal. Judgment and thought content normal. She is not actively hallucinating. Cognition and memory are normal. She is attentive.   Vitals reviewed.      Assessment/Plan   Problems Addressed this Visit        Musculoskeletal and Integument    Closed fracture of left distal radius     Continue under care of Dr Stevenson  for ortho           Other Visit Diagnoses     Encounter for examination following treatment at hospital    -  Primary    records reviewed    Essential tremor        Relevant Medications    primidone (MYSOLINE) 50 MG tablet    Chronic obstructive pulmonary disease, unspecified COPD type (CMS/[facility])        Relevant Medications    albuterol (PROVENTIL) (2.5 MG/3ML) 0.083% nebulizer solution    Respiratory Therapy Supplies (NEBULIZER/TUBING/MOUTHPIECE) kit        Current outpatient and discharge medications have been reconciled for the patient.  Reviewed by: REBEKAH Peacock  Hospital records reviewed.  Discussed any specific concerns patient had regarding testing, labs, Etc.     Order given today for new nebulizer with refill of her respiratory medications.  Trial of primidone for tremors.  Will reevaluate in approximately 1 month.  Patient to return to the clinic sooner if problems or concerns are noted.    We will plan Medicare wellness exam at next visit.           This document has been electronically signed by:  REBEKAH Peacock, FNP-C    Dragon disclaimer:  Much of this encounter note is an electronic transcription/translation of spoken language to printed text. The electronic translation of spoken language may permit erroneous, or at times, nonsensical words or phrases to be inadvertently transcribed; Although I have reviewed the note for such errors, some may still exist.

## 2020-07-21 ENCOUNTER — OFFICE VISIT (OUTPATIENT)
Dept: FAMILY MEDICINE CLINIC | Facility: CLINIC | Age: 75
End: 2020-07-21

## 2020-07-21 VITALS
HEIGHT: 59 IN | OXYGEN SATURATION: 95 % | TEMPERATURE: 97.3 F | BODY MASS INDEX: 15.52 KG/M2 | HEART RATE: 68 BPM | DIASTOLIC BLOOD PRESSURE: 70 MMHG | WEIGHT: 77 LBS | SYSTOLIC BLOOD PRESSURE: 110 MMHG

## 2020-07-21 DIAGNOSIS — K21.9 GASTROESOPHAGEAL REFLUX DISEASE WITHOUT ESOPHAGITIS: ICD-10-CM

## 2020-07-21 DIAGNOSIS — J30.9 CHRONIC ALLERGIC RHINITIS: ICD-10-CM

## 2020-07-21 DIAGNOSIS — J44.9 CHRONIC OBSTRUCTIVE PULMONARY DISEASE, UNSPECIFIED COPD TYPE (HCC): ICD-10-CM

## 2020-07-21 DIAGNOSIS — J06.9 ACUTE URI: Primary | ICD-10-CM

## 2020-07-21 DIAGNOSIS — G25.0 ESSENTIAL TREMOR: ICD-10-CM

## 2020-07-21 PROCEDURE — 99214 OFFICE O/P EST MOD 30 MIN: CPT | Performed by: NURSE PRACTITIONER

## 2020-07-21 RX ORDER — ALBUTEROL SULFATE 2.5 MG/3ML
2.5 SOLUTION RESPIRATORY (INHALATION) EVERY 4 HOURS PRN
Qty: 180 VIAL | Refills: 5 | Status: SHIPPED | OUTPATIENT
Start: 2020-07-21 | End: 2021-04-28 | Stop reason: SDUPTHER

## 2020-07-21 RX ORDER — GUAIFENESIN DEXTROMETHORPHAN HYDROBROMIDE ORAL SOLUTION 10; 100 MG/5ML; MG/5ML
5 SOLUTION ORAL 4 TIMES DAILY PRN
Qty: 180 ML | Refills: 1 | Status: SHIPPED | OUTPATIENT
Start: 2020-07-21 | End: 2021-07-22

## 2020-07-21 RX ORDER — BUDESONIDE AND FORMOTEROL FUMARATE DIHYDRATE 160; 4.5 UG/1; UG/1
2 AEROSOL RESPIRATORY (INHALATION) DAILY
Qty: 1 INHALER | Refills: 5 | Status: SHIPPED | OUTPATIENT
Start: 2020-07-21 | End: 2021-05-10 | Stop reason: SDUPTHER

## 2020-07-21 RX ORDER — OFLOXACIN 3 MG/ML
5 SOLUTION AURICULAR (OTIC) 2 TIMES DAILY
Qty: 10 ML | Refills: 0 | Status: SHIPPED | OUTPATIENT
Start: 2020-07-21 | End: 2020-07-28

## 2020-07-21 RX ORDER — AMOXICILLIN 875 MG/1
875 TABLET, COATED ORAL EVERY 12 HOURS SCHEDULED
Qty: 20 TABLET | Refills: 0 | Status: SHIPPED | OUTPATIENT
Start: 2020-07-21 | End: 2021-04-28

## 2020-07-21 RX ORDER — OMEPRAZOLE 20 MG/1
20 CAPSULE, DELAYED RELEASE ORAL DAILY
Qty: 30 CAPSULE | Refills: 5 | Status: SHIPPED | OUTPATIENT
Start: 2020-07-21 | End: 2021-05-10 | Stop reason: SDUPTHER

## 2020-07-21 RX ORDER — FLUTICASONE PROPIONATE 50 MCG
SPRAY, SUSPENSION (ML) NASAL
Qty: 1 BOTTLE | Refills: 5 | Status: SHIPPED | OUTPATIENT
Start: 2020-07-21 | End: 2021-05-10 | Stop reason: SDUPTHER

## 2020-07-21 RX ORDER — PRIMIDONE 50 MG/1
50 TABLET ORAL 2 TIMES DAILY
Qty: 60 TABLET | Refills: 0 | Status: SHIPPED | OUTPATIENT
Start: 2020-07-21 | End: 2021-01-19

## 2020-07-21 NOTE — PROGRESS NOTES
"Subjective   Ale Gupta is a 74 y.o. female.     Chief Complaint   Patient presents with   • Earache   • Sore Throat       History of Present Illness     Left ear ache-for 5 days.  She reports sore throat is present.  Some Cough and SOA.  She has not had any COVID exposures.  She has been out of her home but has been wearing her mask.  She has been having symptoms for \"5 days\".  She has had chills but not fever.  She reports she 'needs flonase\".    HTN-under care of Dr Monterroso.  She saw him approx 3 months ago.  She has occasional CP and palpitations.  No negative side effects of meds.    GERD-on Prilosec.  No negative side effects.    No associated symptoms such as CP, SOA, HA, or dizziness.    The following portions of the patient's history were reviewed and updated as appropriate: CC, ROS, allergies, current medications, past family history, past medical history, past social history, past surgical history and problem list.      Review of Systems   Constitutional: Negative for appetite change, fatigue and unexpected weight change.   HENT: Positive for ear pain, rhinorrhea and sore throat. Negative for congestion, nosebleeds, postnasal drip, trouble swallowing and voice change.    Eyes: Negative for photophobia, pain and visual disturbance.   Respiratory: Positive for cough and shortness of breath. Negative for chest tightness and wheezing.    Cardiovascular: Negative for chest pain and palpitations.   Gastrointestinal: Negative for abdominal pain, blood in stool, constipation, diarrhea and nausea.   Endocrine: Negative for cold intolerance and polydipsia.   Genitourinary: Negative for difficulty urinating, flank pain, frequency and hematuria.   Musculoskeletal: Negative for arthralgias, back pain, gait problem, joint swelling and myalgias.   Skin: Negative for color change and rash.   Allergic/Immunologic: Negative.    Neurological: Positive for dizziness and headaches. Negative for syncope and numbness.   " "  Hematological: Negative.    Psychiatric/Behavioral: Negative for dysphoric mood, sleep disturbance and suicidal ideas. The patient is not nervous/anxious.    All other systems reviewed and are negative.      Objective     /70   Pulse 68   Temp 97.3 °F (36.3 °C) (Temporal)   Ht 149.9 cm (59\")   Wt 34.9 kg (77 lb)   SpO2 95%   BMI 15.55 kg/m²     Physical Exam   Constitutional: She is oriented to person, place, and time. She appears well-developed and well-nourished. No distress.   HENT:   Head: Normocephalic and atraumatic.   Right Ear: External ear normal. Tympanic membrane is erythematous. A middle ear effusion is present.   Left Ear: External ear normal. Tympanic membrane is erythematous. A middle ear effusion is present.   Nose: Right sinus exhibits frontal sinus tenderness. Left sinus exhibits frontal sinus tenderness.   Oropharynx not examined.  Patient is presently wearing a face covering/mask due to COVID-19 pandemic.   Eyes: Pupils are equal, round, and reactive to light. EOM are normal. No scleral icterus.   Neck: Normal range of motion. Neck supple. No JVD present. No thyromegaly present.   Cardiovascular: Normal rate, regular rhythm and normal heart sounds.   Pulmonary/Chest: Effort normal and breath sounds normal.   Abdominal: Soft. Bowel sounds are normal. There is no tenderness.   Musculoskeletal:   Kyphotic t spine   Lymphadenopathy:     She has cervical adenopathy.        Right cervical: Superficial cervical adenopathy present.        Left cervical: Superficial cervical adenopathy present.   Neurological: She is alert and oriented to person, place, and time. No cranial nerve deficit. Coordination normal.   Skin: Skin is warm and dry. Capillary refill takes less than 2 seconds.   Psychiatric: She has a normal mood and affect. Her behavior is normal. Judgment and thought content normal.   Vitals reviewed.      Assessment/Plan     Problem List Items Addressed This Visit        Respiratory "    Chronic allergic rhinitis    Relevant Medications    fluticasone (Flonase) 50 MCG/ACT nasal spray       Digestive    Gastroesophageal reflux disease without esophagitis    Relevant Medications    omeprazole (priLOSEC) 20 MG capsule      Other Visit Diagnoses     Acute URI    -  Primary    Relevant Medications    amoxicillin (AMOXIL) 875 MG tablet    dextromethorphan-guaifenesin (ROBITUSSIN-DM)  MG/5ML liquid    ofloxacin (Floxin Otic) 0.3 % otic solution    Chronic obstructive pulmonary disease, unspecified COPD type (CMS/[facility])        Relevant Medications    dextromethorphan-guaifenesin (ROBITUSSIN-DM)  MG/5ML liquid    albuterol (PROVENTIL) (2.5 MG/3ML) 0.083% nebulizer solution    budesonide-formoterol (SYMBICORT) 160-4.5 MCG/ACT inhaler    fluticasone (Flonase) 50 MCG/ACT nasal spray    Essential tremor        Relevant Medications    primidone (MYSOLINE) 50 MG tablet          Patient's Body mass index is 15.55 kg/m². BMI is below normal parameters. Recommendations include: encouraged to drink meal replacement shakes..       Understands disease processes and need for medications.  Understands reasons for urgent and emergent care.  Patient (& family) verbalized agreement for treatment plan.   Emotional support and active listening provided.  Patient provided time to verbalize feelings.    Instructed to complete all of antibiotics for acute illness.  Increase PO fluids, avoid/limit caffeine.  Do not save any of the meds for later use.  Rest PRN    RX given for Nebulizer, tubing, and supplies.     Refill on routine maintenance meds today.     RTC 3 months, sooner if needed.           This document has been electronically signed by:  REBEKAH Peacock, FNP-C    Dragon disclaimer:  Much of this encounter note is an electronic transcription/translation of spoken language to printed text. The electronic translation of spoken language may permit erroneous, or at times, nonsensical words or phrases to be  inadvertently transcribed; Although I have reviewed the note for such errors, some may still exist.

## 2021-01-19 DIAGNOSIS — G25.0 ESSENTIAL TREMOR: ICD-10-CM

## 2021-01-19 RX ORDER — PRIMIDONE 50 MG/1
TABLET ORAL
Qty: 60 TABLET | Refills: 0 | Status: SHIPPED | OUTPATIENT
Start: 2021-01-19 | End: 2021-05-10 | Stop reason: SDUPTHER

## 2021-02-03 ENCOUNTER — LAB (OUTPATIENT)
Dept: FAMILY MEDICINE CLINIC | Facility: CLINIC | Age: 76
End: 2021-02-03

## 2021-02-03 ENCOUNTER — OFFICE VISIT (OUTPATIENT)
Dept: FAMILY MEDICINE CLINIC | Facility: CLINIC | Age: 76
End: 2021-02-03

## 2021-02-03 VITALS — BODY MASS INDEX: 15.52 KG/M2 | WEIGHT: 77 LBS | HEIGHT: 59 IN

## 2021-02-03 DIAGNOSIS — Z20.828 EXPOSURE TO SARS-ASSOCIATED CORONAVIRUS: Primary | ICD-10-CM

## 2021-02-03 DIAGNOSIS — Z20.822 EXPOSURE TO COVID-19 VIRUS: Primary | ICD-10-CM

## 2021-02-03 LAB
FLUAV RNA RESP QL NAA+PROBE: NOT DETECTED
FLUBV RNA RESP QL NAA+PROBE: NOT DETECTED
SARS-COV-2 RNA RESP QL NAA+PROBE: NOT DETECTED

## 2021-02-03 PROCEDURE — G2025 DIS SITE TELE SVCS RHC/FQHC: HCPCS | Performed by: NURSE PRACTITIONER

## 2021-02-03 PROCEDURE — 87636 SARSCOV2 & INF A&B AMP PRB: CPT | Performed by: NURSE PRACTITIONER

## 2021-02-03 NOTE — PROGRESS NOTES
Her Covid screen is negative.  She may have screen to early however.  If she develops any symptoms or would like to retest she would probably need to wait until Monday next week.

## 2021-02-03 NOTE — PROGRESS NOTES
"You have chosen to receive care through a telephone visit today. Do you consent to use a telephone visit for your medical care today? Yes    Establish patient makes contact with office of APRN to discuss health conditions.  Patient is due for routine checkup but due to current pandemic is not recommended to present to the office for face-to-face evaluation.      Chief Complaint   Patient presents with   • COVID exposure       Brief HPI/ROS obtained as follows:    COVID exposure-she reports she last seen some people on Sunday who tested positive yesterday.  She reports she is not having any rhinorrhea.  She has some congestion but \"I feel that way anyway.\"    She reports no fever,  No HA or dizziness.      The following portions of the patient's history were reviewed and updated as appropriate: CC, ROS, allergies, current medications, past family history, past medical history, past social history, past surgical history and problem list.    Review of Systems   Constitutional: Negative for appetite change, fatigue and fever.   HENT: Negative for congestion, ear pain, nosebleeds, postnasal drip, rhinorrhea, sore throat, trouble swallowing and voice change.    Eyes: Negative for blurred vision, photophobia and visual disturbance.   Respiratory: Positive for shortness of breath. Negative for cough, chest tightness and wheezing.    Cardiovascular: Negative for chest pain and palpitations.   Gastrointestinal: Negative for abdominal pain, blood in stool, constipation, diarrhea and nausea.   Endocrine: Negative for cold intolerance, heat intolerance and polydipsia.   Genitourinary: Negative for dysuria and hematuria.   Musculoskeletal: Negative for arthralgias, back pain, gait problem and myalgias.   Skin: Negative for color change, pallor and bruise.   Allergic/Immunologic: Negative.    Neurological: Negative for dizziness and headache.   Hematological: Negative.    Psychiatric/Behavioral: Negative for sleep disturbance and " "suicidal ideas. The patient is not nervous/anxious.    All other systems reviewed and are negative.      Assessment     Ht 149.9 cm (59.02\")   Wt 34.9 kg (77 lb)   BMI 15.54 kg/m²     Physical Exam     Patient alert and oriented.  Able to follow conversation without sounding breathless.  No obvious distress.  Thought processes congruent with conversation.  Answers and ask questions without difficulty.      Assessment     Problem List Items Addressed This Visit     None      Visit Diagnoses     Exposure to COVID-19 virus    -  Primary    Relevant Orders    COVID-19 PCR, [facility], NP SWAB IN [facility]AL TRANSPORT MEDIA 24-30 HR TAT - Swab, Nasopharynx    Influenza Antigen, Rapid - Swab, Nasopharynx          Patient instructed and advised to call if symptoms are increasing or new symptoms occur.    Understands reasons for urgent and emergent care.  Patient (& family) verbalized agreement for treatment plan.   Generalized precautions advised including social distancing, hand washing, cough/sneeze hygiene.     Covid test ordered for patient.  She will come by the office to obtain.  Advised patient to be self quarantining for the next 14 days.  To report if any symptoms began or if her shortness of breath with COPD increases.  Patient to report if sputum viscosity or color changes occur    RTC PRN 3-5 days for worsening or non resolving symptoms      This visit has been rescheduled as a phone visit to comply with patient safety concerns in accordance with CDC recommendations. Total time of discussion was 10 minutes.        This document has been electronically signed by:  REBEKAH Peacock, FNP-C    Dragon disclaimer:  Much of this encounter note is an electronic transcription/translation of spoken language to printed text. The electronic translation of spoken language may permit erroneous, or at times, nonsensical words or phrases to be inadvertently transcribed; Although I have reviewed the note for such errors, " some may still exist.

## 2021-04-28 ENCOUNTER — TELEPHONE (OUTPATIENT)
Dept: FAMILY MEDICINE CLINIC | Facility: CLINIC | Age: 76
End: 2021-04-28

## 2021-04-28 DIAGNOSIS — J44.9 CHRONIC OBSTRUCTIVE PULMONARY DISEASE, UNSPECIFIED COPD TYPE (HCC): ICD-10-CM

## 2021-04-28 RX ORDER — ALBUTEROL SULFATE 2.5 MG/3ML
2.5 SOLUTION RESPIRATORY (INHALATION) EVERY 4 HOURS PRN
Qty: 180 EACH | Refills: 5 | Status: SHIPPED | OUTPATIENT
Start: 2021-04-28 | End: 2021-07-22 | Stop reason: SDUPTHER

## 2021-04-28 RX ORDER — PREDNISONE 20 MG/1
20 TABLET ORAL DAILY
Qty: 7 TABLET | Refills: 0 | Status: SHIPPED | OUTPATIENT
Start: 2021-04-28 | End: 2021-05-10

## 2021-04-28 NOTE — TELEPHONE ENCOUNTER
Caller: Angel Andrewse    Relationship: Emergency Contact    Best call back number: [phone]    What medication are you requesting: STEROIDS, ANTIBIOTICS     What are your current symptoms: CHEST CONGESTION       Have you had these symptoms before:    [] Yes  [x] No    Have you been treated for these symptoms before:   [] Yes  [x] No    If a prescription is needed, what is your preferred pharmacy and phone number:      Additional notes: PATIENTS SISTER IS CALLING SHE STATES THAT THE PATIENT WAS IN [facility] LAST WEEK AND WAS PRESCRIBED STEROIDS AND ANTIBIOTIC BY DOCTOR HAYWOOD AT [facility]. THE PATIENT SISTER STATES THAT THE MEDICATION THAT THE PATIENT WAS GIVEN IN THE HOSPITAL HAS BEEN HELPING HER HOWEVER THE PATIENT IS OUT OF THE MEDICATION. THE PATIENT HAS A HOSPITAL FOLLOW UP SCHEDULED FOR 05/10/2021. THE PATIENTS SISTER FEELS THAT THE PATIENT MAY NEED TO BE SEEN SOONER THAN THAT IF THEY CANNOT GET A REFILL ON THE MEDICATION. PLEASE CALL PATIENT TO DISCUSS.

## 2021-04-28 NOTE — TELEPHONE ENCOUNTER
I will send a few more days of steroids but she was on a strong antibiotic she should not need anymore of them.  Continue her breathing treatments consistently.

## 2021-05-10 ENCOUNTER — OFFICE VISIT (OUTPATIENT)
Dept: FAMILY MEDICINE CLINIC | Facility: CLINIC | Age: 76
End: 2021-05-10

## 2021-05-10 ENCOUNTER — TELEPHONE (OUTPATIENT)
Dept: FAMILY MEDICINE CLINIC | Facility: CLINIC | Age: 76
End: 2021-05-10

## 2021-05-10 VITALS
HEIGHT: 59 IN | WEIGHT: 77.9 LBS | HEART RATE: 74 BPM | OXYGEN SATURATION: 100 % | SYSTOLIC BLOOD PRESSURE: 110 MMHG | BODY MASS INDEX: 15.7 KG/M2 | TEMPERATURE: 97.2 F | DIASTOLIC BLOOD PRESSURE: 54 MMHG

## 2021-05-10 DIAGNOSIS — Z00.00 MEDICARE ANNUAL WELLNESS VISIT, SUBSEQUENT: Primary | ICD-10-CM

## 2021-05-10 DIAGNOSIS — I10 ESSENTIAL HYPERTENSION: ICD-10-CM

## 2021-05-10 DIAGNOSIS — J44.9 CHRONIC OBSTRUCTIVE PULMONARY DISEASE, UNSPECIFIED COPD TYPE (HCC): ICD-10-CM

## 2021-05-10 DIAGNOSIS — K59.01 SLOW TRANSIT CONSTIPATION: ICD-10-CM

## 2021-05-10 DIAGNOSIS — K21.9 GASTROESOPHAGEAL REFLUX DISEASE WITHOUT ESOPHAGITIS: ICD-10-CM

## 2021-05-10 DIAGNOSIS — Z09 ENCOUNTER FOR EXAMINATION FOLLOWING TREATMENT AT HOSPITAL: ICD-10-CM

## 2021-05-10 DIAGNOSIS — R73.9 ELEVATED BLOOD SUGAR: ICD-10-CM

## 2021-05-10 DIAGNOSIS — M79.89 LEG SWELLING: ICD-10-CM

## 2021-05-10 DIAGNOSIS — J30.9 CHRONIC ALLERGIC RHINITIS: ICD-10-CM

## 2021-05-10 DIAGNOSIS — G25.0 ESSENTIAL TREMOR: ICD-10-CM

## 2021-05-10 LAB — GLUCOSE BLDC GLUCOMTR-MCNC: 139 MG/DL (ref 70–130)

## 2021-05-10 PROCEDURE — 82962 GLUCOSE BLOOD TEST: CPT | Performed by: NURSE PRACTITIONER

## 2021-05-10 PROCEDURE — G0439 PPPS, SUBSEQ VISIT: HCPCS | Performed by: NURSE PRACTITIONER

## 2021-05-10 RX ORDER — FLUTICASONE PROPIONATE 50 MCG
SPRAY, SUSPENSION (ML) NASAL
Qty: 16 G | Refills: 2 | Status: SHIPPED | OUTPATIENT
Start: 2021-05-10 | End: 2021-07-22 | Stop reason: SDUPTHER

## 2021-05-10 RX ORDER — OMEPRAZOLE 20 MG/1
20 CAPSULE, DELAYED RELEASE ORAL DAILY
Qty: 30 CAPSULE | Refills: 5 | Status: SHIPPED | OUTPATIENT
Start: 2021-05-10 | End: 2021-07-22 | Stop reason: SDUPTHER

## 2021-05-10 RX ORDER — PRIMIDONE 50 MG/1
50 TABLET ORAL 2 TIMES DAILY
Qty: 60 TABLET | Refills: 5 | Status: SHIPPED | OUTPATIENT
Start: 2021-05-10 | End: 2021-07-22

## 2021-05-10 RX ORDER — DOCUSATE SODIUM 100 MG/1
100 CAPSULE, LIQUID FILLED ORAL 2 TIMES DAILY PRN
Qty: 60 CAPSULE | Refills: 5 | Status: SHIPPED | OUTPATIENT
Start: 2021-05-10 | End: 2021-07-22

## 2021-05-10 RX ORDER — BUDESONIDE AND FORMOTEROL FUMARATE DIHYDRATE 160; 4.5 UG/1; UG/1
2 AEROSOL RESPIRATORY (INHALATION) DAILY
Qty: 120 EACH | Refills: 2 | Status: SHIPPED | OUTPATIENT
Start: 2021-05-10

## 2021-05-10 NOTE — ASSESSMENT & PLAN NOTE
Continue home O2.  Continue as needed albuterol and Symbicort.  Discussed with patient resolving pneumonia may give her fatigue as well as some shortness of breath at times.  Encouraged her to be doing deep breathing exercises.  Prescription written for incentive spirometer.  Encouraged her to bring drinking adequate fluid to aid with thinning of secretions.

## 2021-05-10 NOTE — ASSESSMENT & PLAN NOTE
Continue Prilosec 20.  Advised to avoid known GI triggers such as spicy foods.  Upright 30 minutes after meals and avoid eating large meals.  Several small meals daily as able to avoid overfilling stomach.

## 2021-05-10 NOTE — TELEPHONE ENCOUNTER
No  she does not need to be in the hospital.  I advised her to be using incentive spirometer to help with lung exercises, wear her oxygen, and use her breathing treatments as directed.  She needs to be drinking more fluids to aid with reduction of leg swelling.

## 2021-05-10 NOTE — PROGRESS NOTES
"The ABCs of the Annual Wellness Visit  Subsequent Medicare Wellness Visit    No chief complaint on file.      Subjective   History of Present Illness:  Ale Gupta is a 75 y.o. female who presents for a Subsequent Medicare Wellness Visit.    Hospital follow up-[facility]-for pneumonia.  She has been given O2 Tanks to use.  She reports she could benefit from a portable tank.  She has noted some BLE edema.  She reports that she has noted some swelling lingering.  She is drinking mostly soda at home but does drink some water.  She is also been more sedentary as she primarily walks around town for transportation.  HTN-on Norvasc 10 mg and Metoprolol 50 mg.  No negative side effects of medication.  Blood pressure is stable today.  She denies any chest pain or palpitations.  Tremors-has noted some increase.  Would like to see neurology.  She is currently on primidone 50 mg twice daily.  She has noted some tremor \"for a while\" but the symptoms have become worse since she was sick several weeks ago.  GERD-ongoing.  Occasional exacerbation.  Taking Prilosec 20 milligrams.  No negative side effects.  She does try to avoid foods that give her GI upset or reflux.  Constipation-would like to have some bowel softeners.  She has noted some mild increase of constipation over the last 3 to 4 weeks.  Blood sugar concern-patient reports while she was in the hospital she had some elevated blood sugars.  She would like a POC glucose to see what her level is at this time.  History of asthma/COPD -with recent pneumonia.  She does have nebulizer and medication at home.  She is also on Symbicort 160 inhaler.  She is having some mild shortness of breath.  She also has oxygen at home for nighttime use.  Since she has been sick she has been having to use the oxygen at times during the day.  Some occasional cough is present.  She is fatigued more easily but does not feel it is related to her shortness of breath.        HEALTH RISK " ASSESSMENT    Recent Hospitalizations:  Recently treated at the following:  Other: [facility]    Current Medical Providers:  Patient Care Team:  Neema Durbin APRN as PCP - General (Family Medicine)    Smoking Status:  Social History     Tobacco Use   Smoking Status Former Smoker   • Packs/day: 0.25   • Years: 22.00   • Pack years: 5.50   • Types: Cigarettes   • Quit date: 5/10/2010   • Years since quittin.0   Smokeless Tobacco Never Used       Alcohol Consumption:  Social History     Substance and Sexual Activity   Alcohol Use No       Depression Screen:   PHQ-2/PHQ-9 Depression Screening 5/10/2021   Little interest or pleasure in doing things 0   Feeling down, depressed, or hopeless 0   Total Score 0       Fall Risk Screen:  [place] Fall Risk Assessment was completed, and patient is at HIGH risk for falls. Assessment completed on:5/10/2021    Health Habits and Functional and Cognitive Screening:  No flowsheet data found.      Does the patient have evidence of cognitive impairment? No    Asprin use counseling:Does not need ASA (and currently is not on it)    Age-appropriate Screening Schedule:  Refer to the list below for future screening recommendations based on patient's age, sex and/or medical conditions. Orders for these recommended tests are listed in the plan section. The patient has been provided with a written plan.    Health Maintenance   Topic Date Due   • DXA SCAN  Never done   • TDAP/TD VACCINES (1 - Tdap) Never done   • ZOSTER VACCINE (1 of 2) Never done   • MAMMOGRAM  Never done   • INFLUENZA VACCINE  2021          The following portions of the patient's history were reviewed and updated as appropriate: allergies, current medications, past family history, past medical history, past social history, past surgical history and problem list.    Outpatient Medications Prior to Visit   Medication Sig Dispense Refill   • albuterol (PROVENTIL) (2.5 MG/3ML) 0.083% nebulizer solution Take 2.5 mg by  nebulization Every 4 (Four) Hours As Needed for Shortness of Air. 180 each 5   • amLODIPine (NORVASC) 10 MG tablet Take 10 mg by mouth Daily.     • dextromethorphan-guaifenesin (ROBITUSSIN-DM)  MG/5ML liquid Take 5 mL by mouth 4 (Four) Times a Day As Needed for Cough. 180 mL 1   • furosemide (LASIX) 20 MG tablet Take 20 mg by mouth Daily.  6   • lisinopril (PRINIVIL,ZESTRIL) 20 MG tablet Take 20 mg by mouth 2 (Two) Times a Day.     • metoprolol succinate XL (TOPROL-XL) 50 MG 24 hr tablet Take 50 mg by mouth 2 (Two) Times a Day.  6   • potassium chloride (K-DUR) 10 MEQ CR tablet Take 10 mEq by mouth Daily.     • Respiratory Therapy Supplies (NEBULIZER/TUBING/MOUTHPIECE) kit 1 each Take As Directed. 1 each 0   • budesonide-formoterol (SYMBICORT) 160-4.5 MCG/ACT inhaler Inhale 2 puffs Daily. 1 inhaler 5   • fluticasone (Flonase) 50 MCG/ACT nasal spray Administer 2 sprays both nostrils once daily 1 bottle 5   • omeprazole (priLOSEC) 20 MG capsule Take 1 capsule by mouth Daily. 30 capsule 5   • predniSONE (DELTASONE) 20 MG tablet Take 1 tablet by mouth Daily. 7 tablet 0   • primidone (MYSOLINE) 50 MG tablet TAKE ONE TABLET BY MOUTH TWO TIMES A DAY 60 tablet 0     No facility-administered medications prior to visit.       Patient Active Problem List   Diagnosis   • Closed bimalleolar fracture of right ankle   • Essential hypertension   • Chronic allergic rhinitis   • Gastroesophageal reflux disease without esophagitis   • Bimalleolar fracture, right, closed, with routine healing, subsequent encounter   • Closed fracture of left distal radius   • Essential tremor   • Chronic obstructive pulmonary disease (CMS/[facility])       Advanced Care Planning:  ACP discussion was held with the patient during this visit. Patient does not have an advance directive, information provided.    Review of Systems   Constitutional: Positive for fatigue. Negative for appetite change and unexpected weight change.   HENT: Negative for  "congestion, ear pain, nosebleeds, postnasal drip, rhinorrhea, sore throat, trouble swallowing and voice change.    Eyes: Negative for photophobia, pain and visual disturbance.   Respiratory: Positive for shortness of breath. Negative for cough, chest tightness and wheezing.    Cardiovascular: Negative for chest pain and palpitations.   Gastrointestinal: Positive for constipation. Negative for abdominal pain, blood in stool, diarrhea and nausea.   Endocrine: Negative for cold intolerance and polydipsia.   Genitourinary: Negative for difficulty urinating, flank pain, frequency and hematuria.   Musculoskeletal: Positive for arthralgias and gait problem. Negative for back pain, joint swelling and myalgias.   Skin: Negative for color change and rash.   Allergic/Immunologic: Negative.    Neurological: Positive for tremors and weakness (generalized). Negative for dizziness, syncope, numbness and headaches.   Hematological: Negative.    Psychiatric/Behavioral: Negative for dysphoric mood, sleep disturbance and suicidal ideas. The patient is not nervous/anxious.    All other systems reviewed and are negative.      Compared to one year ago, the patient feels her physical health is the same.  Compared to one year ago, the patient feels her mental health is the same.    Reviewed chart for potential of high risk medication in the elderly: yes  Reviewed chart for potential of harmful drug interactions in the elderly:yes    Objective         Vitals:    05/10/21 0932   BP: 110/54   Pulse: 74   Temp: 97.2 °F (36.2 °C)   SpO2: 100%   Weight: 35.3 kg (77 lb 14.4 oz)   Height: 149.9 cm (59.02\")       Body mass index is 15.73 kg/m².  Discussed the patient's BMI with her. The BMI is below average.    Physical Exam  Vitals reviewed.   Constitutional:       General: She is not in acute distress.     Appearance: She is well-developed. She is not diaphoretic.      Comments: Short stature   HENT:      Head: Normocephalic and atraumatic.      " Comments: Oropharynx not examined.  Patient is presently wearing a face covering/mask due to COVID-19 pandemic.     Right Ear: Tympanic membrane, ear canal and external ear normal. Decreased hearing noted.      Left Ear: Tympanic membrane, ear canal and external ear normal. Decreased hearing noted.   Eyes:      General: Lids are normal. No scleral icterus.     Extraocular Movements:      Right eye: Normal extraocular motion and no nystagmus.      Left eye: Normal extraocular motion and no nystagmus.      Conjunctiva/sclera: Conjunctivae normal.      Pupils: Pupils are equal, round, and reactive to light.   Neck:      Thyroid: No thyromegaly.      Vascular: No carotid bruit or JVD.      Trachea: No tracheal tenderness.   Cardiovascular:      Rate and Rhythm: Normal rate and regular rhythm.      Heart sounds: Normal heart sounds, S1 normal and S2 normal. No murmur heard.     Pulmonary:      Effort: Pulmonary effort is normal.      Breath sounds: Examination of the right-lower field reveals decreased breath sounds and rales. Examination of the left-lower field reveals decreased breath sounds and rales. Decreased breath sounds and rales (Scattered and very mild) present. No wheezing or rhonchi.   Chest:      Chest wall: No tenderness.   Abdominal:      General: Bowel sounds are normal.      Palpations: Abdomen is soft. There is no mass.      Tenderness: There is no abdominal tenderness.   Musculoskeletal:         General: No tenderness.      Cervical back: Normal range of motion and neck supple.      Right lower le+ Edema present.      Left lower le+ Edema present.      Comments: No muscular atrophy or flaccidity.  Resting comfortably in wheelchair.  Multiple areas of generalized joint stiffness noted   Lymphadenopathy:      Cervical: No cervical adenopathy.      Right cervical: No superficial cervical adenopathy.     Left cervical: No superficial cervical adenopathy.   Skin:     General: Skin is warm and dry.       Capillary Refill: Capillary refill takes less than 2 seconds.      Coloration: Skin is not pale.      Findings: No erythema.      Nails: There is no clubbing.   Neurological:      Mental Status: She is alert and oriented to person, place, and time.      Cranial Nerves: No cranial nerve deficit or facial asymmetry.      Sensory: No sensory deficit.      Motor: No tremor, atrophy or abnormal muscle tone.      Coordination: Coordination normal.      Deep Tendon Reflexes: Reflexes are normal and symmetric.   Psychiatric:         Attention and Perception: She is attentive.         Mood and Affect: Mood normal.         Speech: Speech normal.         Behavior: Behavior normal.         Thought Content: Thought content normal.         Judgment: Judgment normal.               Assessment/Plan   Medicare Risks and Personalized Health Plan  CMS Preventative Services Quick Reference  Cardiovascular risk  COPD    The above risks/problems have been discussed with the patient.  Pertinent information has been shared with the patient in the After Visit Summary.  Follow up plans and orders are seen below in the Assessment/Plan Section.    Diagnoses and all orders for this visit:    1. Medicare annual wellness visit, subsequent (Primary)    2. Elevated blood sugar  -     POC Glucose    3. Essential tremor  Assessment & Plan:  Continue primidone.  We will refer to neurology per patient's request    Orders:  -     Ambulatory Referral to Neurology  -     primidone (MYSOLINE) 50 MG tablet; Take 1 tablet by mouth 2 (Two) Times a Day.  Dispense: 60 tablet; Refill: 5    4. Chronic obstructive pulmonary disease, unspecified COPD type (CMS/[facility])  Assessment & Plan:  Continue home O2.  Continue as needed albuterol and Symbicort.  Discussed with patient resolving pneumonia may give her fatigue as well as some shortness of breath at times.  Encouraged her to be doing deep breathing exercises.  Prescription written for incentive spirometer.   Encouraged her to bring drinking adequate fluid to aid with thinning of secretions.        Orders:  -     budesonide-formoterol (SYMBICORT) 160-4.5 MCG/ACT inhaler; Inhale 2 puffs Daily.  Dispense: 120 each; Refill: 2    5. Chronic allergic rhinitis  -     fluticasone (Flonase) 50 MCG/ACT nasal spray; Administer 2 sprays both nostrils once daily  Dispense: 16 g; Refill: 2    6. Gastroesophageal reflux disease without esophagitis  Assessment & Plan:  Continue Prilosec 20.  Advised to avoid known GI triggers such as spicy foods.  Upright 30 minutes after meals and avoid eating large meals.  Several small meals daily as able to avoid overfilling stomach.    Orders:  -     omeprazole (priLOSEC) 20 MG capsule; Take 1 capsule by mouth Daily.  Dispense: 30 capsule; Refill: 5    7. Encounter for examination following treatment at hospital  Comments:  Discussed lab findings as well as imaging    8. Essential hypertension  Assessment & Plan:  Continue metoprolol and Norvasc.  Continue to monitor blood pressure at home.      9. Leg swelling  Comments:  Discussed as a side effect of immobility, dehydration, and off her amlodipine.  Encouraged to be moving with gentle ROM exercises, pushing fluids,     10. Slow transit constipation  -     docusate sodium (Colace) 100 MG capsule; Take 1 capsule by mouth 2 (Two) Times a Day As Needed for Constipation.  Dispense: 60 capsule; Refill: 5    Follow Up:  Return in about 3 months (around 8/10/2021) for GERD, HTN.     Hospital records reviewed.  Discussed any specific concerns patient had regarding testing, labs, Etc.   Current outpatient and discharge medications have been reconciled for the patient.  Reviewed by: REBEKAH Peacock    Patient assessed today for fall risk.  Patient advised to limit clutter, have well lit areas, do not walk around in darkness (use nightlight PRN), Non skid rugs if using rugs, use caution if small pets at home.  Non skid foot wear.    Dietary counseling  provided:  Healthy food choices including fruits and vegetables, limit soda and junk foods, adequate water intake.  Be active as physically able.     Refill on routine maintenance meds today.     RTC 3 months, sooner if needed.       An After Visit Summary and PPPS were given to the patient.             This document has been electronically signed by:  REBEKAH Peacock FNP-C Dragon disclaimer:  Much of this encounter note is an electronic transcription/translation of spoken language to printed text. The electronic translation of spoken language may permit erroneous, or at times, nonsensical words or phrases to be inadvertently transcribed; Although I have reviewed the note for such errors, some may still exist.

## 2021-05-10 NOTE — PATIENT INSTRUCTIONS
"It is important for your health to eat a healthy balanced diet, to exercise as tolerated, obtain dental and vision checkups routinely, work on stress reduction and be active about taking care of your mental health.  Routine age related immunizations(pneumonia, flu, shingles if applicable) are recommended.  Be active in obtaining age and gender routine maintenance exams (such as paps, breast exams, colonscopy, prostate exams, etc).    You are encouraged to have safe sex practices for STD prevention.    Be alert/educated on gun and water safety, seek help for any domestic violence concerns, and seatbelt use is strongly encouraged.        https://www.nhlbi.nih.gov/files/docs/public/heart/dash_brief.pdf\">   DASH Eating Plan  DASH stands for Dietary Approaches to Stop Hypertension. The DASH eating plan is a healthy eating plan that has been shown to:  · Reduce high blood pressure (hypertension).  · Reduce your risk for type 2 diabetes, heart disease, and stroke.  · Help with weight loss.  What are tips for following this plan?  Reading food labels  · Check food labels for the amount of salt (sodium) per serving. Choose foods with less than 5 percent of the Daily Value of sodium. Generally, foods with less than 300 milligrams (mg) of sodium per serving fit into this eating plan.  · To find whole grains, look for the word \"whole\" as the first word in the ingredient list.  Shopping  · Buy products labeled as \"low-sodium\" or \"no salt added.\"  · Buy fresh foods. Avoid canned foods and pre-made or frozen meals.  Cooking  · Avoid adding salt when cooking. Use salt-free seasonings or herbs instead of table salt or sea salt. Check with your health care provider or pharmacist before using salt substitutes.  · Do not pandey foods. Cook foods using healthy methods such as baking, boiling, grilling, roasting, and broiling instead.  · Cook with heart-healthy oils, such as olive, canola, avocado, soybean, or sunflower oil.  Meal " planning    · Eat a balanced diet that includes:  ? 4 or more servings of fruits and 4 or more servings of vegetables each day. Try to fill one-half of your plate with fruits and vegetables.  ? 6-8 servings of whole grains each day.  ? Less than 6 oz (170 g) of lean meat, poultry, or fish each day. A 3-oz (85-g) serving of meat is about the same size as a deck of cards. One egg equals 1 oz (28 g).  ? 2-3 servings of low-fat dairy each day. One serving is 1 cup (237 mL).  ? 1 serving of nuts, seeds, or beans 5 times each week.  ? 2-3 servings of heart-healthy fats. Healthy fats called omega-3 fatty acids are found in foods such as walnuts, flaxseeds, fortified milks, and eggs. These fats are also found in cold-water fish, such as sardines, salmon, and mackerel.  · Limit how much you eat of:  ? Canned or prepackaged foods.  ? Food that is high in trans fat, such as some fried foods.  ? Food that is high in saturated fat, such as fatty meat.  ? Desserts and other sweets, sugary drinks, and other foods with added sugar.  ? Full-fat dairy products.  · Do not salt foods before eating.  · Do not eat more than 4 egg yolks a week.  · Try to eat at least 2 vegetarian meals a week.  · Eat more home-cooked food and less restaurant, buffet, and fast food.  Lifestyle  · When eating at a restaurant, ask that your food be prepared with less salt or no salt, if possible.  · If you drink alcohol:  ? Limit how much you use to:  § 0-1 drink a day for women who are not pregnant.  § 0-2 drinks a day for men.  ? Be aware of how much alcohol is in your drink. In the [place] one drink equals one 12 oz bottle of beer (355 mL), one 5 oz glass of wine (148 mL), or one 1½ oz glass of hard liquor (44 mL).  General information  · Avoid eating more than 2,300 mg of salt a day. If you have hypertension, you may need to reduce your sodium intake to 1,500 mg a day.  · Work with your health care provider to maintain a healthy body weight or to lose  weight. Ask what an ideal weight is for you.  · Get at least 30 minutes of exercise that causes your heart to beat faster (aerobic exercise) most days of the week. Activities may include walking, swimming, or biking.  · Work with your health care provider or dietitian to adjust your eating plan to your individual calorie needs.  What foods should I eat?  Fruits  All fresh, dried, or frozen fruit. Canned fruit in natural juice (without added sugar).  Vegetables  Fresh or frozen vegetables (raw, steamed, roasted, or grilled). Low-sodium or reduced-sodium tomato and vegetable juice. Low-sodium or reduced-sodium tomato sauce and tomato paste. Low-sodium or reduced-sodium canned vegetables.  Grains  Whole-grain or whole-wheat bread. Whole-grain or whole-wheat pasta. Brown rice. Oatmeal. Quinoa. Bulgur. Whole-grain and low-sodium cereals. Beatriz bread. Low-fat, low-sodium crackers. Whole-wheat flour tortillas.  Meats and other proteins  Skinless chicken or turkey. Ground chicken or turkey. Pork with fat trimmed off. Fish and seafood. Egg whites. Dried beans, peas, or lentils. Unsalted nuts, nut butters, and seeds. Unsalted canned beans. Lean cuts of beef with fat trimmed off. Low-sodium, lean precooked or cured meat, such as sausages or meat loaves.  Dairy  Low-fat (1%) or fat-free (skim) milk. Reduced-fat, low-fat, or fat-free cheeses. Nonfat, low-sodium ricotta or cottage cheese. Low-fat or nonfat yogurt. Low-fat, low-sodium cheese.  Fats and oils  Soft margarine without trans fats. Vegetable oil. Reduced-fat, low-fat, or light mayonnaise and salad dressings (reduced-sodium). Canola, safflower, olive, avocado, soybean, and sunflower oils. Avocado.  Seasonings and condiments  Herbs. Spices. Seasoning mixes without salt.  Other foods  Unsalted popcorn and pretzels. Fat-free sweets.  The items listed above may not be a complete list of foods and beverages you can eat. Contact a dietitian for more information.  What foods  should I avoid?  Fruits  Canned fruit in a light or heavy syrup. Fried fruit. Fruit in cream or butter sauce.  Vegetables  Creamed or fried vegetables. Vegetables in a cheese sauce. Regular canned vegetables (not low-sodium or reduced-sodium). Regular canned tomato sauce and paste (not low-sodium or reduced-sodium). Regular tomato and vegetable juice (not low-sodium or reduced-sodium). Pickles. Olives.  Grains  Baked goods made with fat, such as croissants, muffins, or some breads. Dry pasta or rice meal packs.  Meats and other proteins  Fatty cuts of meat. Ribs. Fried meat. Quintana. Bologna, salami, and other precooked or cured meats, such as sausages or meat loaves. Fat from the back of a pig (fatback). Bratwurst. Salted nuts and seeds. Canned beans with added salt. Canned or smoked fish. Whole eggs or egg yolks. Chicken or turkey with skin.  Dairy  Whole or 2% milk, cream, and half-and-half. Whole or full-fat cream cheese. Whole-fat or sweetened yogurt. Full-fat cheese. Nondairy creamers. Whipped toppings. Processed cheese and cheese spreads.  Fats and oils  Butter. Stick margarine. Lard. Shortening. Ghee. Quintana fat. Tropical oils, such as coconut, palm kernel, or palm oil.  Seasonings and condiments  Onion salt, garlic salt, seasoned salt, table salt, and sea salt. [facility] sauce. Tartar sauce. Barbecue sauce. Teriyaki sauce. Soy sauce, including reduced-sodium. Steak sauce. Canned and packaged gravies. Fish sauce. Oyster sauce. Cocktail sauce. Store-bought horseradish. Ketchup. Mustard. Meat flavorings and tenderizers. Bouillon cubes. Hot sauces. Pre-made or packaged marinades. Pre-made or packaged taco seasonings. Relishes. Regular salad dressings.  Other foods  Salted popcorn and pretzels.  The items listed above may not be a complete list of foods and beverages you should avoid. Contact a dietitian for more information.  Where to find more information  · National Heart, Lung, and Blood [place]:  www.nhlbi.nih.gov  · American Heart Association: www.heart.org  · Academy of Nutrition and Dietetics: www.eatright.org  · National Kidney Foundation: www.kidney.org  Summary  · The DASH eating plan is a healthy eating plan that has been shown to reduce high blood pressure (hypertension). It may also reduce your risk for type 2 diabetes, heart disease, and stroke.  · When on the DASH eating plan, aim to eat more fresh fruits and vegetables, whole grains, lean proteins, low-fat dairy, and heart-healthy fats.  · With the DASH eating plan, you should limit salt (sodium) intake to 2,300 mg a day. If you have hypertension, you may need to reduce your sodium intake to 1,500 mg a day.  · Work with your health care provider or dietitian to adjust your eating plan to your individual calorie needs.  This information is not intended to replace advice given to you by your health care provider. Make sure you discuss any questions you have with your health care provider.  Document Revised: 11/20/2020 Document Reviewed: 11/20/2020  [employer] Patient Education © 2021 [employer]    Advance Directive    Advance directives are legal documents that let you make choices ahead of time about your health care and medical treatment in case you become unable to communicate for yourself. Advance directives are a way for you to make known your wishes to family, friends, and health care providers. This can let others know about your end-of-life care if you become unable to communicate.  Discussing and writing advance directives should happen over time rather than all at once. Advance directives can be changed depending on your situation and what you want, even after you have signed the advance directives.  There are different types of advance directives, such as:  · Medical power of .  · Living will.  · Do not resuscitate (DNR) or do not attempt resuscitation (DNAR) order.  Health care proxy and medical power of   A health care  proxy is also called a health care agent. This is a person who is appointed to make medical decisions for you in cases where you are unable to make the decisions yourself. Generally, people choose someone they know well and trust to represent their preferences. Make sure to ask this person for an agreement to act as your proxy. A proxy may have to exercise judgment in the event of a medical decision for which your wishes are not known.  A medical power of  is a legal document that names your health care proxy. Depending on the laws in your state, after the document is written, it may also need to be:  · Signed.  · Notarized.  · Dated.  · Copied.  · Witnessed.  · Incorporated into your medical record.  You may also want to appoint someone to manage your money in a situation in which you are unable to do so. This is called a durable power of  for finances. It is a separate legal document from the durable power of  for health care. You may choose the same person or someone different from your health care proxy to act as your agent in money matters.  If you do not appoint a proxy, or if there is a concern that the proxy is not acting in your best interests, a court may appoint a guardian to act on your behalf.  Living will  A living will is a set of instructions that state your wishes about medical care when you cannot express them yourself. Health care providers should keep a copy of your living will in your medical record. You may want to give a copy to family members or friends. To alert caregivers in case of an emergency, you can place a card in your wallet to let them know that you have a living will and where they can find it. A living will is used if you become:  · Terminally ill.  · Disabled.  · Unable to communicate or make decisions.  Items to consider in your living will include:  · To use or not to use life-support equipment, such as dialysis machines and breathing machines  (ventilators).  · A DNR or DNAR order. This tells health care providers not to use cardiopulmonary resuscitation (CPR) if breathing or heartbeat stops.  · To use or not to use tube feeding.  · To be given or not to be given food and fluids.  · Comfort (palliative) care when the goal becomes comfort rather than a cure.  · Donation of organs and tissues.  A living will does not give instructions for distributing your money and property if you should pass away.  DNR or DNAR  A DNR or DNAR order is a request not to have CPR in the event that your heart stops beating or you stop breathing. If a DNR or DNAR order has not been made and shared, a health care provider will try to help any patient whose heart has stopped or who has stopped breathing. If you plan to have surgery, talk with your health care provider about how your DNR or DNAR order will be followed if problems occur.  What if I do not have an advance directive?  If you do not have an advance directive, some states assign family decision makers to act on your behalf based on how closely you are related to them. Each state has its own laws about advance directives. You may want to check with your health care provider, , or state representative about the laws in your state.  Summary  · Advance directives are the legal documents that allow you to make choices ahead of time about your health care and medical treatment in case you become unable to tell others about your care.  · The process of discussing and writing advance directives should happen over time. You can change the advance directives, even after you have signed them.  · Advance directives include DNR or DNAR orders, living olivera, and designating an agent as your medical power of .  This information is not intended to replace advice given to you by your health care provider. Make sure you discuss any questions you have with your health care provider.  Document Revised: 01/28/2021 Document  Reviewed: 07/16/2020  Elsevier Patient Education © 2021 [employer].    Fall Prevention in the Home, Adult  Falls can cause injuries and can affect people from all age groups. There are many simple things that you can do to make your home safe and to help prevent falls. Ask for help when making these changes, if needed.  What actions can I take to prevent falls?  General instructions  · Use good lighting in all rooms. Replace any light bulbs that burn out.  · Turn on lights if it is dark. Use night-lights.  · Place frequently used items in easy-to-reach places. Lower the shelves around your home if necessary.  · Set up furniture so that there are clear paths around it. Avoid moving your furniture around.  · Remove throw rugs and other tripping hazards from the floor.  · Avoid walking on wet floors.  · Fix any uneven floor surfaces.  · Add color or contrast paint or tape to grab bars and handrails in your home. Place contrasting color strips on the first and last steps of stairways.  · When you use a stepladder, make sure that it is completely opened and that the sides are firmly locked. Have someone hold the ladder while you are using it. Do not climb a closed stepladder.  · Be aware of any and all pets.  What can I do in the bathroom?         · Keep the floor dry. Immediately clean up any water that spills onto the floor.  · Remove soap buildup in the tub or shower on a regular basis.  · Use non-skid mats or decals on the floor of the tub or shower.  · Attach bath mats securely with double-sided, non-slip rug tape.  · If you need to sit down while you are in the shower, use a plastic, non-slip stool.  · Install grab bars by the toilet and in the tub and shower. Do not use towel bars as grab bars.  What can I do in the bedroom?  · Make sure that a bedside light is easy to reach.  · Do not use oversized bedding that drapes onto the floor.  · Have a firm chair that has side arms to use for getting dressed.  What can  I do in the kitchen?  · Clean up any spills right away.  · If you need to reach for something above you, use a sturdy step stool that has a grab bar.  · Keep electrical cables out of the way.  · Do not use floor polish or wax that makes floors slippery. If you must use wax, make sure that it is non-skid floor wax.  What can I do in the stairways?  · Do not leave any items on the stairs.  · Make sure that you have a light switch at the top of the stairs and the bottom of the stairs. Have them installed if you do not have them.  · Make sure that there are handrails on both sides of the stairs. Fix handrails that are broken or loose. Make sure that handrails are as long as the stairways.  · Install non-slip stair treads on all stairs in your home.  · Avoid having throw rugs at the top or bottom of stairways, or secure the rugs with carpet tape to prevent them from moving.  · Choose a carpet design that does not hide the edge of steps on the stairway.  · Check any carpeting to make sure that it is firmly attached to the stairs. Fix any carpet that is loose or worn.  What can I do on the outside of my home?  · Use bright outdoor lighting.  · Regularly repair the edges of walkways and driveways and fix any cracks.  · Remove high doorway thresholds.  · Trim any shrubbery on the main path into your home.  · Regularly check that handrails are securely fastened and in good repair. Both sides of any steps should have handrails.  · Install guardrails along the edges of any raised decks or porches.  · Clear walkways of debris and clutter, including tools and rocks.  · Have leaves, snow, and ice cleared regularly.  · Use sand or salt on walkways during winter months.  · In the garage, clean up any spills right away, including grease or oil spills.  What other actions can I take?  · Wear closed-toe shoes that fit well and support your feet. Wear shoes that have rubber soles or low heels.  · Use mobility aids as needed, such as  canes, walkers, scooters, and crutches.  · Review your medicines with your health care provider. Some medicines can cause dizziness or changes in blood pressure, which increase your risk of falling.  Talk with your health care provider about other ways that you can decrease your risk of falls. This may include working with a physical therapist or  to improve your strength, balance, and endurance.  Where to find more information  · Centers for Disease Control and Prevention, [place]: https://www.cdc.gov  · National [place] on Aging: https://bf5moeq.j luis.nih.gov  Contact a health care provider if:  · You are afraid of falling at home.  · You feel weak, drowsy, or dizzy at home.  · You fall at home.  Summary  · There are many simple things that you can do to make your home safe and to help prevent falls.  · Ways to make your home safe include removing tripping hazards and installing grab bars in the bathroom.  · Ask for help when making these changes in your home.  This information is not intended to replace advice given to you by your health care provider. Make sure you discuss any questions you have with your health care provider.  Document Revised: 11/30/2018 Document Reviewed: 08/02/2018  Elsevier Patient Education © 2021 Elsevier Inc.

## 2021-05-10 NOTE — TELEPHONE ENCOUNTER
Caller: Jose Andrews    Relationship: Emergency Contact    Best call back number: [phone] (EL)    What was the call regarding: JOSE CALLED TO SEE IF [facility] WAS GOING TO ADMIT THE PATIENT TO THE HOSPITAL FOR PNEUMONIA.    Do you require a callback: YES.  CALL EL BACK AT THE NUMBER ABOVE.

## 2021-07-20 ENCOUNTER — READMISSION MANAGEMENT (OUTPATIENT)
Dept: CALL CENTER | Facility: HOSPITAL | Age: 76
End: 2021-07-20

## 2021-07-20 ENCOUNTER — TRANSITIONAL CARE MANAGEMENT TELEPHONE ENCOUNTER (OUTPATIENT)
Dept: CALL CENTER | Facility: HOSPITAL | Age: 76
End: 2021-07-20

## 2021-07-20 NOTE — OUTREACH NOTE
Call Center TCM Note      Responses   [facility] patient discharged from?  Non-   Does the patient have one of the following disease processes/diagnoses(primary or secondary)?  Other   TCM attempt successful?  Yes   Call start time  1153   Call end time  1155   Discharge diagnosis  kidney failure   Is patient permission given to speak with other caregiver?  Yes   List who call center can speak with  - Julia   Person spoke with today (if not patient) and relationship  sister- Julia   Is the patient taking all medications as directed (includes completed medication regime)?  Yes   Does the patient have a primary care provider?   Yes   Does the patient have an appointment with their PCP within 7 days of discharge?  Yes   Comments regarding PCP  hospital f/u with ANGIE Hodgson on 7/22 at 11:30 am   Has the patient kept scheduled appointments due by today?  N/A   Has home health visited the patient within 72 hours of discharge?  N/A   Did the patient receive a copy of their discharge instructions?  Yes   What is the patient's perception of their health status since discharge?  Improving   Is the patient/caregiver able to teach back the hierarchy of who to call/visit for symptoms/problems? PCP, Specialist, Home health nurse, Urgent Care, ED, 911  Yes   TCM call completed?  Yes   Wrap up additional comments  Per sister, patient is doing well, confirmed f/u appt with ANGIE Hodgson for 7/22.          Concha Min RN    7/20/2021, 11:55 EDT

## 2021-07-20 NOTE — OUTREACH NOTE
Prep Survey      Responses   [facility] from?  Non-[facility]   Is LACE score < 7 ?  Non-BH Discharge   Emergency Room discharge w/ pulse ox?  No   Eligibility  [facility]   Date of Discharge  07/19/21   Discharge Disposition  Home or Self Care   Discharge diagnosis  unknown   Does the patient have one of the following disease processes/diagnoses(primary or secondary)?  Other   Prep survey completed?  Yes          Gayla Christiansen RN

## 2021-07-22 ENCOUNTER — OFFICE VISIT (OUTPATIENT)
Dept: FAMILY MEDICINE CLINIC | Facility: CLINIC | Age: 76
End: 2021-07-22

## 2021-07-22 VITALS
OXYGEN SATURATION: 93 % | DIASTOLIC BLOOD PRESSURE: 58 MMHG | HEART RATE: 63 BPM | SYSTOLIC BLOOD PRESSURE: 130 MMHG | TEMPERATURE: 96.8 F

## 2021-07-22 DIAGNOSIS — J44.9 COPD MIXED TYPE (HCC): Chronic | ICD-10-CM

## 2021-07-22 DIAGNOSIS — K21.9 GASTROESOPHAGEAL REFLUX DISEASE WITHOUT ESOPHAGITIS: Chronic | ICD-10-CM

## 2021-07-22 DIAGNOSIS — J96.11 CHRONIC RESPIRATORY FAILURE WITH HYPOXIA (HCC): Primary | Chronic | ICD-10-CM

## 2021-07-22 DIAGNOSIS — E44.1 MILD PROTEIN-CALORIE MALNUTRITION (HCC): Chronic | ICD-10-CM

## 2021-07-22 DIAGNOSIS — N17.9 ACUTE RENAL FAILURE, UNSPECIFIED ACUTE RENAL FAILURE TYPE (HCC): Chronic | ICD-10-CM

## 2021-07-22 DIAGNOSIS — I10 ESSENTIAL HYPERTENSION: ICD-10-CM

## 2021-07-22 DIAGNOSIS — R53.81 DEBILITY: Chronic | ICD-10-CM

## 2021-07-22 DIAGNOSIS — D50.9 MICROCYTIC ANEMIA: Chronic | ICD-10-CM

## 2021-07-22 DIAGNOSIS — E87.20 METABOLIC ACIDOSIS: Chronic | ICD-10-CM

## 2021-07-22 PROBLEM — I50.9 CONGESTIVE HEART FAILURE (HCC): Status: ACTIVE | Noted: 2021-07-22

## 2021-07-22 PROCEDURE — 99215 OFFICE O/P EST HI 40 MIN: CPT | Performed by: PHYSICIAN ASSISTANT

## 2021-07-22 PROCEDURE — 80053 COMPREHEN METABOLIC PANEL: CPT | Performed by: PHYSICIAN ASSISTANT

## 2021-07-22 PROCEDURE — 80061 LIPID PANEL: CPT | Performed by: PHYSICIAN ASSISTANT

## 2021-07-22 PROCEDURE — 85025 COMPLETE CBC W/AUTO DIFF WBC: CPT | Performed by: PHYSICIAN ASSISTANT

## 2021-07-22 PROCEDURE — 83540 ASSAY OF IRON: CPT | Performed by: PHYSICIAN ASSISTANT

## 2021-07-22 PROCEDURE — 1111F DSCHRG MED/CURRENT MED MERGE: CPT | Performed by: PHYSICIAN ASSISTANT

## 2021-07-22 PROCEDURE — 84466 ASSAY OF TRANSFERRIN: CPT | Performed by: PHYSICIAN ASSISTANT

## 2021-07-22 RX ORDER — POTASSIUM CHLORIDE 750 MG/1
10 TABLET, EXTENDED RELEASE ORAL DAILY
COMMUNITY
Start: 2021-06-23

## 2021-07-22 RX ORDER — METOPROLOL SUCCINATE 100 MG/1
100 TABLET, EXTENDED RELEASE ORAL 2 TIMES DAILY
Status: ON HOLD | COMMUNITY
Start: 2021-06-23 | End: 2021-08-02 | Stop reason: SDUPTHER

## 2021-07-22 RX ORDER — ALBUTEROL SULFATE 2.5 MG/3ML
SOLUTION RESPIRATORY (INHALATION)
Status: ON HOLD | COMMUNITY
Start: 2021-07-13 | End: 2021-07-30

## 2021-07-22 RX ORDER — FLUTICASONE PROPIONATE 50 MCG
2 SPRAY, SUSPENSION (ML) NASAL DAILY
COMMUNITY
Start: 2021-07-13

## 2021-07-22 RX ORDER — METOPROLOL SUCCINATE 100 MG/1
100 TABLET, EXTENDED RELEASE ORAL 2 TIMES DAILY
COMMUNITY
Start: 2021-07-13 | End: 2021-07-22

## 2021-07-22 RX ORDER — PREDNISONE 20 MG/1
TABLET ORAL
Status: ON HOLD | COMMUNITY
Start: 2021-07-20 | End: 2021-07-30

## 2021-07-22 RX ORDER — OMEPRAZOLE 20 MG/1
CAPSULE, DELAYED RELEASE ORAL
Status: ON HOLD | COMMUNITY
Start: 2021-07-13 | End: 2021-07-31

## 2021-07-22 RX ORDER — BUDESONIDE AND FORMOTEROL FUMARATE DIHYDRATE 160; 4.5 UG/1; UG/1
160 AEROSOL RESPIRATORY (INHALATION) 2 TIMES DAILY
COMMUNITY
Start: 2021-07-13 | End: 2021-07-22

## 2021-07-22 RX ORDER — BUMETANIDE 1 MG/1
1 TABLET ORAL 2 TIMES DAILY
Status: ON HOLD | COMMUNITY
Start: 2021-07-19 | End: 2021-08-06 | Stop reason: SDUPTHER

## 2021-07-22 RX ORDER — POTASSIUM CHLORIDE 750 MG/1
10 CAPSULE, EXTENDED RELEASE ORAL
COMMUNITY
Start: 2021-07-19 | End: 2021-07-22

## 2021-07-22 NOTE — PROGRESS NOTES
Subjective   Ale Gupta is a 75 y.o. female.       Chief Complaint -chronic respiratory failure    History of Present Illness -    ROS    She is here today with her niece who is helping with her care.  The patient is currently nonambulatory and in wheelchair today.    Chronic respiratory failure-  Stable with 2 L of oxygen at 24/7.      The following portions of the patient's history were reviewed and updated as appropriate: allergies, current medications, past family history, past medical history, past social history, past surgical history and problem list.    Review of Systems    Objective  Vital signs:  /58   Pulse 63   Temp 96.8 °F (36 °C) (Temporal)   SpO2 93%     Physical Exam    {The following data was reviewed by (Optional):53746}  {Ambulatory Labs (Optional):84941}  {Data reviewed (Optional):85821:::1}       Assessment/Plan     Diagnoses and all orders for this visit:    1. Chronic respiratory failure with hypoxia (CMS/HCC) (Primary)  -     Comprehensive Metabolic Panel    2. Essential hypertension  -     CBC & Differential  -     Lipid Panel  -     Iron Profile    3. Gastroesophageal reflux disease without esophagitis    4. Acute renal failure, unspecified acute renal failure type (CMS/HCC)  -     Ambulatory Referral to Nephrology    5. Renal failure, unspecified chronicity   -     Iron Profile        {Time Spent (Optional):05232}    Patient was given instructions and counseling regarding his condition or for health maintenance advice. Please see specific information pulled into the AVS if appropriate      This document has been electronically signed by:  Ketty Miranda PA-C

## 2021-07-23 LAB
ALBUMIN SERPL-MCNC: 4.5 G/DL (ref 3.5–5.2)
ALBUMIN/GLOB SERPL: 2.3 G/DL
ALP SERPL-CCNC: 72 U/L (ref 39–117)
ALT SERPL W P-5'-P-CCNC: 48 U/L (ref 1–33)
ANION GAP SERPL CALCULATED.3IONS-SCNC: 13 MMOL/L (ref 5–15)
AST SERPL-CCNC: 30 U/L (ref 1–32)
BASOPHILS # BLD AUTO: 0.02 10*3/MM3 (ref 0–0.2)
BASOPHILS NFR BLD AUTO: 0.3 % (ref 0–1.5)
BILIRUB SERPL-MCNC: 1.1 MG/DL (ref 0–1.2)
BUN SERPL-MCNC: 64 MG/DL (ref 8–23)
BUN/CREAT SERPL: 43 (ref 7–25)
CALCIUM SPEC-SCNC: 9.3 MG/DL (ref 8.6–10.5)
CHLORIDE SERPL-SCNC: 86 MMOL/L (ref 98–107)
CHOLEST SERPL-MCNC: 131 MG/DL (ref 0–200)
CO2 SERPL-SCNC: 38 MMOL/L (ref 22–29)
CREAT SERPL-MCNC: 1.49 MG/DL (ref 0.57–1)
DEPRECATED RDW RBC AUTO: 68.7 FL (ref 37–54)
EOSINOPHIL # BLD AUTO: 0 10*3/MM3 (ref 0–0.4)
EOSINOPHIL NFR BLD AUTO: 0 % (ref 0.3–6.2)
ERYTHROCYTE [DISTWIDTH] IN BLOOD BY AUTOMATED COUNT: 20.8 % (ref 12.3–15.4)
GFR SERPL CREATININE-BSD FRML MDRD: 34 ML/MIN/1.73
GLOBULIN UR ELPH-MCNC: 2 GM/DL
GLUCOSE SERPL-MCNC: 114 MG/DL (ref 65–99)
HCT VFR BLD AUTO: 30.2 % (ref 34–46.6)
HDLC SERPL-MCNC: 28 MG/DL (ref 40–60)
HGB BLD-MCNC: 9.7 G/DL (ref 12–15.9)
IRON 24H UR-MRATE: 224 MCG/DL (ref 37–145)
IRON SATN MFR SERPL: 83 % (ref 20–50)
LDLC SERPL CALC-MCNC: 84 MG/DL (ref 0–100)
LDLC/HDLC SERPL: 2.94 {RATIO}
LYMPHOCYTES # BLD AUTO: 0.53 10*3/MM3 (ref 0.7–3.1)
LYMPHOCYTES NFR BLD AUTO: 7.2 % (ref 19.6–45.3)
MCH RBC QN AUTO: 29.8 PG (ref 26.6–33)
MCHC RBC AUTO-ENTMCNC: 32.1 G/DL (ref 31.5–35.7)
MCV RBC AUTO: 92.9 FL (ref 79–97)
MONOCYTES # BLD AUTO: 0.57 10*3/MM3 (ref 0.1–0.9)
MONOCYTES NFR BLD AUTO: 7.8 % (ref 5–12)
NEUTROPHILS NFR BLD AUTO: 6.17 10*3/MM3 (ref 1.7–7)
NEUTROPHILS NFR BLD AUTO: 84.3 % (ref 42.7–76)
PLATELET # BLD AUTO: 203 10*3/MM3 (ref 140–450)
PMV BLD AUTO: 13.4 FL (ref 6–12)
POTASSIUM SERPL-SCNC: 3.8 MMOL/L (ref 3.5–5.2)
PROT SERPL-MCNC: 6.5 G/DL (ref 6–8.5)
RBC # BLD AUTO: 3.25 10*6/MM3 (ref 3.77–5.28)
SODIUM SERPL-SCNC: 137 MMOL/L (ref 136–145)
TIBC SERPL-MCNC: 271 MCG/DL (ref 298–536)
TRANSFERRIN SERPL-MCNC: 182 MG/DL (ref 200–360)
TRIGL SERPL-MCNC: 103 MG/DL (ref 0–150)
VLDLC SERPL-MCNC: 19 MG/DL (ref 5–40)
WBC # BLD AUTO: 7.32 10*3/MM3 (ref 3.4–10.8)

## 2021-07-23 NOTE — PROGRESS NOTES
Transitional Care Follow Up Visit  Subjective     Ale Gupta is a 75 y.o. female who presents for a transitional care management visit.    Her family member (niece) is here with her today helping with history.    Within 48 business hours after discharge our office contacted her via telephone to coordinate her care and needs.      I reviewed and discussed the details of that call along with the discharge summary, hospital problems, inpatient lab results, inpatient diagnostic studies, and consultation reports with Ale.     Current outpatient and discharge medications have been reconciled for the patient.  Reviewed by: ANGIE Leal      Date of TCM Phone Call 7/20/2021   Hospital [facility] - Inpatient   Date of Discharge 7/19/2021   Discharge Disposition Home or Self Care     Risk for Readmission (LACE) No data recorded    History of Present Illness   Course During Hospital Stay:      On 7/4/2021 this 75-year-old female that was known to have advanced COPD presented to [facility] ER with leg cramps and slightly increased shortness of breath.  She was found to have significant anemia and acute renal insufficiency.  She was admitted and given IV fluids.  She did have some improvement in renal function over time.  Renal ultrasound was done initially showing cortical atrophy of both kidneys.  There was no evidence of obstruction.  Patient did develop some increasing shortness of breath and have some wheezing.  Chest x-ray showed pulmonary vascular congestion.  Echocardiogram was done showing fairly well preserved ventricular function with diastolic dysfunction and mitral regurgitation.  Cardiology was consulted and recommended the patient resume ACE inhibitor and diuretic when able.  Fluids were reduced and then stopped after a few days.  Patient was eating and drinking fairly well.  Patient's wheezing improved with steroid administration.  Patient did have significant anemia but  hemoglobin remained stable for several days.  GI was consulted.  Patient did have heme positive stool on presentation but no gross bleeding.  Patient was found to have iron overload but also a folate deficiency.  Patient was started on folate replacement.  Work-up for hemochromatosis was sent out by GI.  Then patient did have an acute drop of hemoglobin to 5.7 and was transfused 2 units of PRBC.  Patient received Lasix 20 mg IV in between.  This did improve her creatinine again.  Patient also had increasing leukocytosis but H&H had remained stable.  Chest x-ray continued to show some vascular congestion and pulmonary effusion versus underlying infiltrate.  Patient was placed back on the bicarb drip.  Patient's arterial blood gas showed metabolic acidosis.  Patient began to have some mild confusion.  Patient was saturating well on nasal cannula and vital signs were otherwise stable.  Rutledge catheter was replaced to keep track of urine.  At this point patient was transferred to higher level of care for nephrology at [facility] with Dr. Sarmiento.  Steroids were tapered and renal dose of Zosyn was given.  Patient was transferred to [facility] where she was watched and monitored for acute kidney injury.  Her discharge diagnosis and current status are as follows:    Acute kidney injury-  Stable but not at goal.  She does need follow-up as an outpatient    Metabolic acidosis-stable and patient is no longer confused    Macrocytic anemia-significantly improved after blood transfusion.  Continue to monitor    COPD exacerbation-improved with antibiotic and steroid therapy.  She denies any chest pain or wheezing today    Pneumonia-improved with antibiotic and steroid therapy    Iron overload-stable.    Protein calorie malnutrition-not at goal as patient continues to have issues with nutrition.  Her family member states that she has been encouraging her to eat or drink meal replacement shakes.    Patient did experience  some debility while in the hospital and has not been walking since discharge.  She does have home health coming out for physical therapy and patient is able to stand unassisted for short period of time now.  Patient does continue to use portable oxygen 2 L 24/7.  Since patient is unable to walk unassisted it is unreasonable to expect her to pull a large tank.  She does need some assistive devices and portable oxygen tanks to help with daily care.       The following portions of the patient's history were reviewed and updated as appropriate: allergies, current medications, past family history, past medical history, past social history, past surgical history and problem list.    Review of Systems   Constitutional: Positive for activity change, appetite change and fatigue. Negative for fever.   HENT: Negative for ear pain, sinus pressure and sore throat.    Eyes: Negative for pain and visual disturbance.   Respiratory: Negative for cough and chest tightness.    Cardiovascular: Negative for chest pain and palpitations.   Gastrointestinal: Negative for abdominal pain, constipation, diarrhea, nausea and vomiting.   Endocrine: Negative for polydipsia and polyuria.   Genitourinary: Negative for dysuria and frequency.   Musculoskeletal: Positive for gait problem. Negative for back pain and myalgias.   Skin: Negative for color change and rash.   Allergic/Immunologic: Negative for food allergies and immunocompromised state.   Neurological: Positive for weakness. Negative for dizziness, syncope and headaches.   Hematological: Negative for adenopathy. Does not bruise/bleed easily.   Psychiatric/Behavioral: Negative for hallucinations and suicidal ideas. The patient is not nervous/anxious.      Vitals:    07/22/21 1121   BP: 130/58   Pulse: 63   Temp: 96.8 °F (36 °C)   SpO2: 93%   Patient unable to stand today for proper height or weight.    Objective   Physical Exam  Vitals and nursing note reviewed.   Constitutional:        Appearance: She is well-developed.      Comments: Patient is in wheelchair today and is nonambulatory.  She is thin but alert and oriented.   HENT:      Head: Normocephalic and atraumatic.      Right Ear: Tympanic membrane normal.      Left Ear: Tympanic membrane normal.      Nose: Nose normal.      Mouth/Throat:      Mouth: Mucous membranes are moist.      Pharynx: No oropharyngeal exudate or posterior oropharyngeal erythema.   Eyes:      Extraocular Movements: Extraocular movements intact.      Conjunctiva/sclera: Conjunctivae normal.   Neck:      Thyroid: No thyromegaly.      Trachea: No tracheal deviation.   Cardiovascular:      Rate and Rhythm: Normal rate and regular rhythm.      Heart sounds: Normal heart sounds. No murmur heard.     Pulmonary:      Effort: Pulmonary effort is normal. No respiratory distress.      Breath sounds: Normal breath sounds. No wheezing.   Abdominal:      General: Bowel sounds are normal.      Palpations: Abdomen is soft.      Tenderness: There is no abdominal tenderness. There is no guarding.   Musculoskeletal:         General: No tenderness. Normal range of motion.      Cervical back: Normal range of motion and neck supple.      Right lower leg: No edema.      Left lower leg: No edema.      Comments: Muscular weakness noted with atrophy of lower extremities   Lymphadenopathy:      Cervical: No cervical adenopathy.   Skin:     General: Skin is warm and dry.      Findings: No rash.   Neurological:      General: No focal deficit present.      Mental Status: She is alert and oriented to person, place, and time.   Psychiatric:         Mood and Affect: Mood normal.         Behavior: Behavior normal.         Thought Content: Thought content normal.         Assessment/Plan   Diagnoses and all orders for this visit:    1. Chronic respiratory failure with hypoxia (CMS/HCC) (Primary)  Comments:  Continue 2 L oxygen 24/7  Rx written for portable oxygen tanks and wheelchairs  Orders:  -      Comprehensive Metabolic Panel    2. Essential hypertension  Comments:  Stable  Continue metoprolol  Orders:  -     CBC & Differential  -     Lipid Panel  -     Cancel: Manual Differential    3. Gastroesophageal reflux disease without esophagitis  Comments:  Continue omeprazole  Advised to report any black or red stool    4. Acute renal failure, unspecified acute renal failure type (CMS/HCC)  Comments:  Referral to nephrology for further evaluation  Lab work ordered  Orders:  -     Ambulatory Referral to Nephrology    5. COPD mixed type (CMS/HCC)  Comments:  Continue albuterol  Continue to monitor    6. Metabolic acidosis  Comments:  Lab work ordered for reassessment but this appears to have resolved prior to discharge    7. Microcytic anemia  Comments:  Continue to monitor  Lab work ordered for home health to draw for further evaluation    8. Mild protein-calorie malnutrition (CMS/HCC)  Comments:  Encouraged meal replacement shakes and high-protein food/drinks    9. Debility  Comments:  Advised to continue home physical therapy    Other orders  -     Iron Profile

## 2021-07-23 NOTE — PATIENT INSTRUCTIONS
Fall Prevention in the Home, Adult  Falls can cause injuries. They can happen to people of all ages. There are many things you can do to make your home safe and to help prevent falls. Ask for help when making these changes, if needed.  What actions can I take to prevent falls?  General Instructions  · Use good lighting in all rooms. Replace any light bulbs that burn out.  · Turn on the lights when you go into a dark area. Use night-lights.  · Keep items that you use often in easy-to-reach places. Lower the shelves around your home if necessary.  · Set up your furniture so you have a clear path. Avoid moving your furniture around.  · Do not have throw rugs and other things on the floor that can make you trip.  · Avoid walking on wet floors.  · If any of your floors are uneven, fix them.  · Add color or contrast paint or tape to clearly mart[place] and help you see:  ? Any grab bars or handrails.  ? First and last steps of stairways.  ? Where the edge of each step is.  · If you use a stepladder:  ? Make sure that it is fully opened. Do not climb a closed stepladder.  ? Make sure that both sides of the stepladder are locked into place.  ? Ask someone to hold the stepladder for you while you use it.  · If there are any pets around you, be aware of where they are.  What can I do in the bathroom?         · Keep the floor dry. Clean up any water that spills onto the floor as soon as it happens.  · Remove soap buildup in the tub or shower regularly.  · Use non-skid mats or decals on the floor of the tub or shower.  · Attach bath mats securely with double-sided, non-slip rug tape.  · If you need to sit down in the shower, use a plastic, non-slip stool.  · Install grab bars by the toilet and in the tub and shower. Do not use towel bars as grab bars.  What can I do in the bedroom?  · Make sure that you have a light by your bed that is easy to reach.  · Do not use any sheets or blankets that are too big for your bed. They should not  hang down onto the floor.  · Have a firm chair that has side arms. You can use this for support while you get dressed.  What can I do in the kitchen?  · Clean up any spills right away.  · If you need to reach something above you, use a strong step stool that has a grab bar.  · Keep electrical cords out of the way.  · Do not use floor polish or wax that makes floors slippery. If you must use wax, use non-skid floor wax.  What can I do with my stairs?  · Do not leave any items on the stairs.  · Make sure that you have a light switch at the top of the stairs and the bottom of the stairs. If you do not have them, ask someone to add them for you.  · Make sure that there are handrails on both sides of the stairs, and use them. Fix handrails that are broken or loose. Make sure that handrails are as long as the stairways.  · Install non-slip stair treads on all stairs in your home.  · Avoid having throw rugs at the top or bottom of the stairs. If you do have throw rugs, attach them to the floor with carpet tape.  · Choose a carpet that does not hide the edge of the steps on the stairway.  · Check any carpeting to make sure that it is firmly attached to the stairs. Fix any carpet that is loose or worn.  What can I do on the outside of my home?  · Use bright outdoor lighting.  · Regularly fix the edges of walkways and driveways and fix any cracks.  · Remove anything that might make you trip as you walk through a door, such as a raised step or threshold.  · Trim any bushes or trees on the path to your home.  · Regularly check to see if handrails are loose or broken. Make sure that both sides of any steps have handrails.  · Install guardrails along the edges of any raised decks and porches.  · Clear walking paths of anything that might make someone trip, such as tools or rocks.  · Have any leaves, snow, or ice cleared regularly.  · Use sand or salt on walking paths during winter.  · Clean up any spills in your garage right  away. This includes grease or oil spills.  What other actions can I take?  · Wear shoes that:  ? Have a low heel. Do not wear high heels.  ? Have rubber bottoms.  ? Are comfortable and fit you well.  ? Are closed at the toe. Do not wear open-toe sandals.  · Use tools that help you move around (mobility aids) if they are needed. These include:  ? Canes.  ? Walkers.  ? Scooters.  ? Crutches.  · Review your medicines with your doctor. Some medicines can make you feel dizzy. This can increase your chance of falling.  Ask your doctor what other things you can do to help prevent falls.  Where to find more information  · Centers for Disease Control and Prevention, STEADI: https://cdc.gov  · National [place] on Aging: https://gq4jvzr.j luis.nih.gov  Contact a doctor if:  · You are afraid of falling at home.  · You feel weak, drowsy, or dizzy at home.  · You fall at home.  Summary  · There are many simple things that you can do to make your home safe and to help prevent falls.  · Ways to make your home safe include removing tripping hazards and installing grab bars in the bathroom.  · Ask for help when making these changes in your home.  This information is not intended to replace advice given to you by your health care provider. Make sure you discuss any questions you have with your health care provider.  Document Revised: 04/09/2020 Document Reviewed: 08/02/2018  [employer] Patient Education © 2021 [employer]      Acute Kidney Injury, Adult    Acute kidney injury is a sudden worsening of kidney function. The kidneys are organs that have several jobs. They filter the blood to remove waste products and extra fluid. They also maintain a healthy balance of minerals and hormones in the body, which helps control blood pressure and keep bones strong. With this condition, your kidneys do not do their jobs as well as they should.  This condition ranges from mild to severe. Over time, it may develop into long-lasting (chronic)  kidney disease. Early detection and treatment may prevent acute kidney injury from developing into a chronic condition.  What are the causes?  Common causes of this condition include:  · A problem with blood flow to the kidneys. This may be caused by:  ? Low blood pressure (hypotension) or shock.  ? Blood loss.  ? Heart and blood vessel (cardiovascular) disease.  ? Severe burns.  ? Liver disease.  · Direct damage to the kidneys. This may be caused by:  ? Certain medicines.  ? A kidney infection.  ? Poisoning.  ? Being around or in contact with toxic substances.  ? A surgical wound.  ? A hard, direct hit to the kidney area.  · A sudden blockage of urine flow. This may be caused by:  ? Cancer.  ? Kidney stones.  ? An enlarged prostate in males.  What increases the risk?  You are more likely to develop this condition if you:  · Are older than age 65.  · Are female.  · Are hospitalized, especially if you are in critical condition.  · Have certain conditions, such as:  ? Chronic kidney disease.  ? Diabetes.  ? Coronary artery disease and heart failure.  ? Pulmonary disease.  ? Chronic liver disease.  What are the signs or symptoms?  Symptoms of this condition may not be obvious until the condition becomes severe. Symptoms of this condition can include:  · Tiredness (lethargy) or difficulty staying awake.  · Nausea or vomiting.  · Swelling (edema) of the face, legs, ankles, or feet.  · Problems with urination, such as:  ? Pain in the abdomen, or pain along the side of your stomach (flank).  ? Producing little or no urine.  ? Passing urine with a weak flow.  · Muscle twitches and cramps, especially in the legs.  · Confusion or trouble concentrating.  · Loss of appetite.  · Fever.  How is this diagnosed?  Your health care provider can diagnose this condition based on your symptoms, medical history, and a physical exam.   You may also have other tests, such as:  · Blood tests.  · Urine tests.  · Imaging tests.  · A test in  which a sample of tissue is removed from the kidneys to be examined under a microscope (kidney biopsy).  How is this treated?  Treatment for this condition depends on the cause and how severe the condition is. In mild cases, treatment may not be needed. The kidneys may heal on their own. In more severe cases, treatment will involve:  · Treating the cause of the kidney injury. This may involve changing any medicines you are taking or adjusting your dosage.  · Fluids. You may need specialized IV fluids to balance your body's needs.  · Having a catheter placed to drain urine and prevent blockages.  · Preventing problems from occurring. This may mean avoiding certain medicines or procedures that can cause further injury to the kidneys.  In some cases, treatment may also require:  · A procedure to remove toxic wastes from the body (dialysis or continuous renal replacement therapy, CRRT).  · Surgery. This may be done to repair a torn kidney or to remove the blockage from the urinary system.  Follow these instructions at home:  Medicines  · Take over-the-counter and prescription medicines only as told by your health care provider.  · Do not take any new medicines without your health care provider's approval. Many medicines can worsen your kidney damage.  · Do not take any vitamin and mineral supplements without your health care provider's approval. Many nutritional supplements can worsen your kidney damage.  Lifestyle    · If your health care provider prescribed changes to your diet, follow them. You may need to decrease the amount of protein you eat.  · Achieve and maintain a healthy weight. If you need help with this, ask your health care provider.  · Start or continue an exercise plan. Try to exercise at least 30 minutes a day, 5 days a week.  · Do not use any products that contain nicotine or tobacco, such as cigarettes, e-cigarettes, and chewing tobacco. If you need help quitting, ask your health care  provider.  General instructions    · Keep track of your blood pressure. Report changes in your blood pressure as told by your health care provider.  · Stay up to date with your vaccines. Ask your health care provider which vaccines you need.  · Keep all follow-up visits as told by your health care provider. This is important.  Where to find more information  · American Association of Kidney Patients: www.aakp.org  · National Kidney Foundation: www.kidney.org  · American Kidney Fund: www.akfinc.org  · Life Options Rehabilitation Program:  ? www.lifeoptions.org  ? www.kidneyschool.org  Contact a health care provider if:  · Your symptoms get worse.  · You develop new symptoms.  Get help right away if:  · You develop symptoms of worsening kidney disease, which include:  ? Headaches.  ? Abnormally dark or light skin.  ? Easy bruising.  ? Frequent hiccups.  ? Chest pain.  ? Shortness of breath.  ? End of menstruation in women.  ? Seizures.  ? Confusion or altered mental status.  ? Abdominal or back pain.  ? Itchiness.  · You have a fever.  · Your body is producing less urine.  · You have pain or bleeding when you urinate.  Summary  · Acute kidney injury is a sudden worsening of kidney function.  · Acute kidney injury can be caused by problems with blood flow to the kidneys, direct damage to the kidneys, and sudden blockage of urine flow.  · Symptoms of this condition may not be obvious until it becomes severe. Symptoms may include edema, lethargy, confusion, nausea or vomiting, and problems passing urine.  · This condition can be diagnosed with blood tests, urine tests, and imaging tests. Sometimes a kidney biopsy is done to diagnose this condition.  · Treatment for this condition often involves treating the underlying cause. It is treated with fluids, medicines, diet changes, dialysis, or surgery.  This information is not intended to replace advice given to you by your health care provider. Make sure you discuss any  questions you have with your health care provider.  Document Revised: 10/27/2020 Document Reviewed: 10/27/2020  [employer] Patient Education © 2021 [employer]

## 2021-07-25 NOTE — PROGRESS NOTES
Would you care to address lab work as I feel you have more familiar with this patient?  She does have an appointment with you in 2 weeks.  I saw her for hospital follow-up (see my note from earlier this week where several issues were addressed).

## 2021-07-27 DIAGNOSIS — N17.9 ACUTE RENAL FAILURE, UNSPECIFIED ACUTE RENAL FAILURE TYPE (HCC): ICD-10-CM

## 2021-07-27 DIAGNOSIS — D50.9 MICROCYTIC ANEMIA: Primary | ICD-10-CM

## 2021-07-27 DIAGNOSIS — R79.0 ABNORMAL IRON SATURATION: ICD-10-CM

## 2021-07-28 NOTE — PROGRESS NOTES
Can we please ask Ale to really be drinking and come back by the office for a repeat lab on her kidneys?  If it is still not normal I will make her a referral to see the Nephrology group.

## 2021-07-29 ENCOUNTER — LAB (OUTPATIENT)
Dept: FAMILY MEDICINE CLINIC | Facility: CLINIC | Age: 76
End: 2021-07-29

## 2021-07-29 DIAGNOSIS — J44.9 COPD MIXED TYPE (HCC): ICD-10-CM

## 2021-07-29 DIAGNOSIS — I50.20 SYSTOLIC CONGESTIVE HEART FAILURE, UNSPECIFIED HF CHRONICITY (HCC): ICD-10-CM

## 2021-07-29 DIAGNOSIS — J30.9 CHRONIC ALLERGIC RHINITIS: ICD-10-CM

## 2021-07-29 DIAGNOSIS — G25.0 ESSENTIAL TREMOR: ICD-10-CM

## 2021-07-29 DIAGNOSIS — I10 ESSENTIAL HYPERTENSION: ICD-10-CM

## 2021-07-29 DIAGNOSIS — S82.841D BIMALLEOLAR FRACTURE, RIGHT, CLOSED, WITH ROUTINE HEALING, SUBSEQUENT ENCOUNTER: ICD-10-CM

## 2021-07-29 DIAGNOSIS — S82.841D CLOSED BIMALLEOLAR FRACTURE OF RIGHT ANKLE WITH ROUTINE HEALING, SUBSEQUENT ENCOUNTER: ICD-10-CM

## 2021-07-29 DIAGNOSIS — N17.9 ACUTE KIDNEY INJURY (HCC): ICD-10-CM

## 2021-07-29 DIAGNOSIS — S52.592D OTHER CLOSED FRACTURE OF DISTAL END OF LEFT RADIUS WITH ROUTINE HEALING, SUBSEQUENT ENCOUNTER: ICD-10-CM

## 2021-07-29 PROCEDURE — 83540 ASSAY OF IRON: CPT | Performed by: NURSE PRACTITIONER

## 2021-07-29 PROCEDURE — 83880 ASSAY OF NATRIURETIC PEPTIDE: CPT | Performed by: PHYSICIAN ASSISTANT

## 2021-07-29 PROCEDURE — 36415 COLL VENOUS BLD VENIPUNCTURE: CPT | Performed by: NURSE PRACTITIONER

## 2021-07-29 PROCEDURE — 85025 COMPLETE CBC W/AUTO DIFF WBC: CPT | Performed by: NURSE PRACTITIONER

## 2021-07-29 PROCEDURE — 84466 ASSAY OF TRANSFERRIN: CPT | Performed by: NURSE PRACTITIONER

## 2021-07-29 PROCEDURE — 80053 COMPREHEN METABOLIC PANEL: CPT | Performed by: NURSE PRACTITIONER

## 2021-07-30 ENCOUNTER — APPOINTMENT (OUTPATIENT)
Dept: CT IMAGING | Facility: HOSPITAL | Age: 76
End: 2021-07-30

## 2021-07-30 ENCOUNTER — HOSPITAL ENCOUNTER (INPATIENT)
Facility: HOSPITAL | Age: 76
LOS: 7 days | Discharge: HOME HEALTH | End: 2021-08-06
Attending: EMERGENCY MEDICINE | Admitting: INTERNAL MEDICINE

## 2021-07-30 ENCOUNTER — APPOINTMENT (OUTPATIENT)
Dept: GENERAL RADIOLOGY | Facility: HOSPITAL | Age: 76
End: 2021-07-30

## 2021-07-30 DIAGNOSIS — I50.32 CHRONIC DIASTOLIC CONGESTIVE HEART FAILURE (HCC): ICD-10-CM

## 2021-07-30 DIAGNOSIS — J44.9 COPD MIXED TYPE (HCC): ICD-10-CM

## 2021-07-30 DIAGNOSIS — N17.9 ACUTE RENAL FAILURE, UNSPECIFIED ACUTE RENAL FAILURE TYPE (HCC): Primary | ICD-10-CM

## 2021-07-30 DIAGNOSIS — I48.0 PAROXYSMAL ATRIAL FIBRILLATION WITH RAPID VENTRICULAR RESPONSE (HCC): ICD-10-CM

## 2021-07-30 LAB
ALBUMIN SERPL-MCNC: 4.18 G/DL (ref 3.5–5.2)
ALBUMIN SERPL-MCNC: 4.4 G/DL (ref 3.5–5.2)
ALBUMIN/GLOB SERPL: 1.9 G/DL
ALBUMIN/GLOB SERPL: 1.9 G/DL
ALP SERPL-CCNC: 111 U/L (ref 39–117)
ALP SERPL-CCNC: 99 U/L (ref 39–117)
ALT SERPL W P-5'-P-CCNC: 14 U/L (ref 1–33)
ALT SERPL W P-5'-P-CCNC: 19 U/L (ref 1–33)
ANION GAP SERPL CALCULATED.3IONS-SCNC: 13.2 MMOL/L (ref 5–15)
ANION GAP SERPL CALCULATED.3IONS-SCNC: 15.7 MMOL/L (ref 5–15)
ANISOCYTOSIS BLD QL: NORMAL
APTT PPP: 30.2 SECONDS (ref 25.5–35.4)
AST SERPL-CCNC: 15 U/L (ref 1–32)
AST SERPL-CCNC: 16 U/L (ref 1–32)
BACTERIA UR QL AUTO: ABNORMAL /HPF
BASOPHILS # BLD AUTO: 0.02 10*3/MM3 (ref 0–0.2)
BASOPHILS # BLD AUTO: 0.03 10*3/MM3 (ref 0–0.2)
BASOPHILS NFR BLD AUTO: 0.3 % (ref 0–1.5)
BASOPHILS NFR BLD AUTO: 0.4 % (ref 0–1.5)
BILIRUB SERPL-MCNC: 1.1 MG/DL (ref 0–1.2)
BILIRUB SERPL-MCNC: 1.1 MG/DL (ref 0–1.2)
BILIRUB UR QL STRIP: NEGATIVE
BUN SERPL-MCNC: 80 MG/DL (ref 8–23)
BUN SERPL-MCNC: 89 MG/DL (ref 8–23)
BUN/CREAT SERPL: 29 (ref 7–25)
BUN/CREAT SERPL: 34.2 (ref 7–25)
CALCIUM SPEC-SCNC: 9 MG/DL (ref 8.6–10.5)
CALCIUM SPEC-SCNC: 9.2 MG/DL (ref 8.6–10.5)
CHLORIDE SERPL-SCNC: 88 MMOL/L (ref 98–107)
CHLORIDE SERPL-SCNC: 88 MMOL/L (ref 98–107)
CK SERPL-CCNC: 16 U/L (ref 20–180)
CLARITY UR: CLEAR
CO2 SERPL-SCNC: 32.8 MMOL/L (ref 22–29)
CO2 SERPL-SCNC: 33.3 MMOL/L (ref 22–29)
COLOR UR: YELLOW
CREAT SERPL-MCNC: 2.34 MG/DL (ref 0.57–1)
CREAT SERPL-MCNC: 3.07 MG/DL (ref 0.57–1)
DACRYOCYTES BLD QL SMEAR: NORMAL
DEPRECATED RDW RBC AUTO: 66.4 FL (ref 37–54)
DEPRECATED RDW RBC AUTO: 71.3 FL (ref 37–54)
EOSINOPHIL # BLD AUTO: 0 10*3/MM3 (ref 0–0.4)
EOSINOPHIL # BLD AUTO: 0 10*3/MM3 (ref 0–0.4)
EOSINOPHIL NFR BLD AUTO: 0 % (ref 0.3–6.2)
EOSINOPHIL NFR BLD AUTO: 0 % (ref 0.3–6.2)
ERYTHROCYTE [DISTWIDTH] IN BLOOD BY AUTOMATED COUNT: 21.1 % (ref 12.3–15.4)
ERYTHROCYTE [DISTWIDTH] IN BLOOD BY AUTOMATED COUNT: 22.2 % (ref 12.3–15.4)
FLUAV SUBTYP SPEC NAA+PROBE: NOT DETECTED
FLUBV RNA ISLT QL NAA+PROBE: NOT DETECTED
GFR SERPL CREATININE-BSD FRML MDRD: 15 ML/MIN/1.73
GFR SERPL CREATININE-BSD FRML MDRD: 20 ML/MIN/1.73
GLOBULIN UR ELPH-MCNC: 2.2 GM/DL
GLOBULIN UR ELPH-MCNC: 2.3 GM/DL
GLUCOSE SERPL-MCNC: 110 MG/DL (ref 65–99)
GLUCOSE SERPL-MCNC: 129 MG/DL (ref 65–99)
GLUCOSE UR STRIP-MCNC: NEGATIVE MG/DL
HCT VFR BLD AUTO: 23.7 % (ref 34–46.6)
HCT VFR BLD AUTO: 25.6 % (ref 34–46.6)
HGB BLD-MCNC: 7.5 G/DL (ref 12–15.9)
HGB BLD-MCNC: 8.2 G/DL (ref 12–15.9)
HGB UR QL STRIP.AUTO: NEGATIVE
HOLD SPECIMEN: NORMAL
HOLD SPECIMEN: NORMAL
HYALINE CASTS UR QL AUTO: ABNORMAL /LPF
IMM GRANULOCYTES # BLD AUTO: 0.03 10*3/MM3 (ref 0–0.05)
IMM GRANULOCYTES # BLD AUTO: 0.05 10*3/MM3 (ref 0–0.05)
IMM GRANULOCYTES NFR BLD AUTO: 0.4 % (ref 0–0.5)
IMM GRANULOCYTES NFR BLD AUTO: 0.6 % (ref 0–0.5)
INR PPP: 1.13 (ref 0.9–1.1)
IRON 24H UR-MRATE: 213 MCG/DL (ref 37–145)
IRON SATN MFR SERPL: 79 % (ref 20–50)
KETONES UR QL STRIP: NEGATIVE
LEUKOCYTE ESTERASE UR QL STRIP.AUTO: NEGATIVE
LIPASE SERPL-CCNC: 69 U/L (ref 13–60)
LYMPHOCYTES # BLD AUTO: 0.49 10*3/MM3 (ref 0.7–3.1)
LYMPHOCYTES # BLD AUTO: 0.77 10*3/MM3 (ref 0.7–3.1)
LYMPHOCYTES NFR BLD AUTO: 6.9 % (ref 19.6–45.3)
LYMPHOCYTES NFR BLD AUTO: 9 % (ref 19.6–45.3)
MCH RBC QN AUTO: 28.7 PG (ref 26.6–33)
MCH RBC QN AUTO: 29.4 PG (ref 26.6–33)
MCHC RBC AUTO-ENTMCNC: 31.6 G/DL (ref 31.5–35.7)
MCHC RBC AUTO-ENTMCNC: 32 G/DL (ref 31.5–35.7)
MCV RBC AUTO: 90.8 FL (ref 79–97)
MCV RBC AUTO: 91.8 FL (ref 79–97)
MONOCYTES # BLD AUTO: 0.51 10*3/MM3 (ref 0.1–0.9)
MONOCYTES # BLD AUTO: 0.73 10*3/MM3 (ref 0.1–0.9)
MONOCYTES NFR BLD AUTO: 7.2 % (ref 5–12)
MONOCYTES NFR BLD AUTO: 8.5 % (ref 5–12)
NEUTROPHILS NFR BLD AUTO: 6.06 10*3/MM3 (ref 1.7–7)
NEUTROPHILS NFR BLD AUTO: 6.96 10*3/MM3 (ref 1.7–7)
NEUTROPHILS NFR BLD AUTO: 81.5 % (ref 42.7–76)
NEUTROPHILS NFR BLD AUTO: 85.2 % (ref 42.7–76)
NITRITE UR QL STRIP: NEGATIVE
NRBC BLD AUTO-RTO: 0.2 /100 WBC (ref 0–0.2)
NRBC BLD AUTO-RTO: 0.3 /100 WBC (ref 0–0.2)
NT-PROBNP SERPL-MCNC: 4328 PG/ML (ref 0–900)
OVALOCYTES BLD QL SMEAR: NORMAL
PH UR STRIP.AUTO: 6 [PH] (ref 5–8)
PLATELET # BLD AUTO: 325 10*3/MM3 (ref 140–450)
PLATELET # BLD AUTO: 419 10*3/MM3 (ref 140–450)
PMV BLD AUTO: 12.7 FL (ref 6–12)
PMV BLD AUTO: 12.9 FL (ref 6–12)
POTASSIUM SERPL-SCNC: 3.5 MMOL/L (ref 3.5–5.2)
POTASSIUM SERPL-SCNC: 3.5 MMOL/L (ref 3.5–5.2)
PROT SERPL-MCNC: 6.4 G/DL (ref 6–8.5)
PROT SERPL-MCNC: 6.7 G/DL (ref 6–8.5)
PROT UR QL STRIP: ABNORMAL
PROTHROMBIN TIME: 14.9 SECONDS (ref 12.8–14.5)
RBC # BLD AUTO: 2.61 10*6/MM3 (ref 3.77–5.28)
RBC # BLD AUTO: 2.79 10*6/MM3 (ref 3.77–5.28)
RBC # UR: ABNORMAL /HPF
REF LAB TEST METHOD: ABNORMAL
SARS-COV-2 RNA PNL SPEC NAA+PROBE: NOT DETECTED
SMALL PLATELETS BLD QL SMEAR: NORMAL
SODIUM SERPL-SCNC: 134 MMOL/L (ref 136–145)
SODIUM SERPL-SCNC: 137 MMOL/L (ref 136–145)
SODIUM UR-SCNC: 26 MMOL/L
SP GR UR STRIP: 1.01 (ref 1–1.03)
SQUAMOUS #/AREA URNS HPF: ABNORMAL /HPF
TIBC SERPL-MCNC: 268 MCG/DL (ref 298–536)
TRANSFERRIN SERPL-MCNC: 180 MG/DL (ref 200–360)
UROBILINOGEN UR QL STRIP: ABNORMAL
WBC # BLD AUTO: 7.11 10*3/MM3 (ref 3.4–10.8)
WBC # BLD AUTO: 8.54 10*3/MM3 (ref 3.4–10.8)
WBC UR QL AUTO: ABNORMAL /HPF
WHOLE BLOOD HOLD SPECIMEN: NORMAL

## 2021-07-30 PROCEDURE — 25010000002 HEPARIN (PORCINE) PER 1000 UNITS: Performed by: INTERNAL MEDICINE

## 2021-07-30 PROCEDURE — 71045 X-RAY EXAM CHEST 1 VIEW: CPT

## 2021-07-30 PROCEDURE — 82550 ASSAY OF CK (CPK): CPT | Performed by: PHYSICIAN ASSISTANT

## 2021-07-30 PROCEDURE — 74176 CT ABD & PELVIS W/O CONTRAST: CPT | Performed by: RADIOLOGY

## 2021-07-30 PROCEDURE — 87636 SARSCOV2 & INF A&B AMP PRB: CPT | Performed by: PHYSICIAN ASSISTANT

## 2021-07-30 PROCEDURE — 74176 CT ABD & PELVIS W/O CONTRAST: CPT

## 2021-07-30 PROCEDURE — 99283 EMERGENCY DEPT VISIT LOW MDM: CPT

## 2021-07-30 PROCEDURE — 85025 COMPLETE CBC W/AUTO DIFF WBC: CPT | Performed by: PHYSICIAN ASSISTANT

## 2021-07-30 PROCEDURE — 81001 URINALYSIS AUTO W/SCOPE: CPT | Performed by: PHYSICIAN ASSISTANT

## 2021-07-30 PROCEDURE — 83690 ASSAY OF LIPASE: CPT | Performed by: PHYSICIAN ASSISTANT

## 2021-07-30 PROCEDURE — 85730 THROMBOPLASTIN TIME PARTIAL: CPT | Performed by: PHYSICIAN ASSISTANT

## 2021-07-30 PROCEDURE — 84300 ASSAY OF URINE SODIUM: CPT | Performed by: INTERNAL MEDICINE

## 2021-07-30 PROCEDURE — 99223 1ST HOSP IP/OBS HIGH 75: CPT | Performed by: INTERNAL MEDICINE

## 2021-07-30 PROCEDURE — 80053 COMPREHEN METABOLIC PANEL: CPT | Performed by: PHYSICIAN ASSISTANT

## 2021-07-30 PROCEDURE — 84156 ASSAY OF PROTEIN URINE: CPT | Performed by: INTERNAL MEDICINE

## 2021-07-30 PROCEDURE — 85007 BL SMEAR W/DIFF WBC COUNT: CPT | Performed by: PHYSICIAN ASSISTANT

## 2021-07-30 PROCEDURE — 71045 X-RAY EXAM CHEST 1 VIEW: CPT | Performed by: RADIOLOGY

## 2021-07-30 PROCEDURE — 82570 ASSAY OF URINE CREATININE: CPT | Performed by: INTERNAL MEDICINE

## 2021-07-30 PROCEDURE — 85610 PROTHROMBIN TIME: CPT | Performed by: PHYSICIAN ASSISTANT

## 2021-07-30 RX ORDER — SODIUM CHLORIDE 9 MG/ML
75 INJECTION, SOLUTION INTRAVENOUS CONTINUOUS
Status: ACTIVE | OUTPATIENT
Start: 2021-07-30 | End: 2021-07-31

## 2021-07-30 RX ORDER — HEPARIN SODIUM 5000 [USP'U]/ML
5000 INJECTION, SOLUTION INTRAVENOUS; SUBCUTANEOUS EVERY 8 HOURS SCHEDULED
Status: DISCONTINUED | OUTPATIENT
Start: 2021-07-30 | End: 2021-07-31

## 2021-07-30 RX ORDER — SODIUM CHLORIDE 0.9 % (FLUSH) 0.9 %
10 SYRINGE (ML) INJECTION EVERY 12 HOURS SCHEDULED
Status: DISCONTINUED | OUTPATIENT
Start: 2021-07-30 | End: 2021-08-06 | Stop reason: HOSPADM

## 2021-07-30 RX ORDER — SODIUM CHLORIDE 0.9 % (FLUSH) 0.9 %
10 SYRINGE (ML) INJECTION AS NEEDED
Status: DISCONTINUED | OUTPATIENT
Start: 2021-07-30 | End: 2021-08-06 | Stop reason: HOSPADM

## 2021-07-30 RX ADMIN — SODIUM CHLORIDE 75 ML/HR: 9 INJECTION, SOLUTION INTRAVENOUS at 20:39

## 2021-07-30 RX ADMIN — SODIUM CHLORIDE 500 ML: 9 INJECTION, SOLUTION INTRAVENOUS at 15:41

## 2021-07-30 RX ADMIN — HEPARIN SODIUM 5000 UNITS: 5000 INJECTION INTRAVENOUS; SUBCUTANEOUS at 20:38

## 2021-07-31 ENCOUNTER — APPOINTMENT (OUTPATIENT)
Dept: CARDIOLOGY | Facility: HOSPITAL | Age: 76
End: 2021-07-31

## 2021-07-31 LAB
ABO GROUP BLD: NORMAL
ABO GROUP BLD: NORMAL
ANION GAP SERPL CALCULATED.3IONS-SCNC: 11.7 MMOL/L (ref 5–15)
ANISOCYTOSIS BLD QL: NORMAL
BASOPHILS # BLD AUTO: 0.01 10*3/MM3 (ref 0–0.2)
BASOPHILS NFR BLD AUTO: 0.2 % (ref 0–1.5)
BH CV ECHO MEAS - ACS: 1.5 CM
BH CV ECHO MEAS - AO ROOT AREA (BSA CORRECTED): 2.8
BH CV ECHO MEAS - AO ROOT AREA: 5.9 CM^2
BH CV ECHO MEAS - AO ROOT DIAM: 2.8 CM
BH CV ECHO MEAS - BSA(HAYCOCK): 1 M^2
BH CV ECHO MEAS - BSA: 0.98 M^2
BH CV ECHO MEAS - BZI_BMI: 21.3 KILOGRAMS/M^2
BH CV ECHO MEAS - BZI_METRIC_HEIGHT: 119.4 CM
BH CV ECHO MEAS - BZI_METRIC_WEIGHT: 30.4 KG
BH CV ECHO MEAS - EDV(CUBED): 74.1 ML
BH CV ECHO MEAS - EDV(MOD-SP4): 30.9 ML
BH CV ECHO MEAS - EDV(TEICH): 78.6 ML
BH CV ECHO MEAS - EF(CUBED): 55.8 %
BH CV ECHO MEAS - EF(MOD-SP4): 66.7 %
BH CV ECHO MEAS - EF(TEICH): 47.9 %
BH CV ECHO MEAS - ESV(CUBED): 32.8 ML
BH CV ECHO MEAS - ESV(MOD-SP4): 10.3 ML
BH CV ECHO MEAS - ESV(TEICH): 41 ML
BH CV ECHO MEAS - FS: 23.8 %
BH CV ECHO MEAS - IVS/LVPW: 0.85
BH CV ECHO MEAS - IVSD: 1.1 CM
BH CV ECHO MEAS - LA DIMENSION: 4.2 CM
BH CV ECHO MEAS - LA/AO: 1.5
BH CV ECHO MEAS - LV DIASTOLIC VOL/BSA (35-75): 31.5 ML/M^2
BH CV ECHO MEAS - LV MASS(C)D: 178.2 GRAMS
BH CV ECHO MEAS - LV MASS(C)DI: 181.3 GRAMS/M^2
BH CV ECHO MEAS - LV SYSTOLIC VOL/BSA (12-30): 10.5 ML/M^2
BH CV ECHO MEAS - LVIDD: 4.2 CM
BH CV ECHO MEAS - LVIDS: 3.2 CM
BH CV ECHO MEAS - LVLD AP4: 4.4 CM
BH CV ECHO MEAS - LVLS AP4: 3.3 CM
BH CV ECHO MEAS - LVOT AREA (M): 2.3 CM^2
BH CV ECHO MEAS - LVOT AREA: 2.3 CM^2
BH CV ECHO MEAS - LVOT DIAM: 1.7 CM
BH CV ECHO MEAS - LVPWD: 1.3 CM
BH CV ECHO MEAS - MV A MAX VEL: 116 CM/SEC
BH CV ECHO MEAS - MV E MAX VEL: 100 CM/SEC
BH CV ECHO MEAS - MV E/A: 0.86
BH CV ECHO MEAS - PA ACC TIME: 0.04 SEC
BH CV ECHO MEAS - PA PR(ACCEL): 60.1 MMHG
BH CV ECHO MEAS - RAP SYSTOLE: 10 MMHG
BH CV ECHO MEAS - RVSP: 49.7 MMHG
BH CV ECHO MEAS - SI(CUBED): 42.1 ML/M^2
BH CV ECHO MEAS - SI(MOD-SP4): 21 ML/M^2
BH CV ECHO MEAS - SI(TEICH): 38.3 ML/M^2
BH CV ECHO MEAS - SV(CUBED): 41.3 ML
BH CV ECHO MEAS - SV(MOD-SP4): 20.6 ML
BH CV ECHO MEAS - SV(TEICH): 37.6 ML
BH CV ECHO MEAS - TR MAX VEL: 315 CM/SEC
BLD GP AB SCN SERPL QL: NEGATIVE
BUN SERPL-MCNC: 80 MG/DL (ref 8–23)
BUN/CREAT SERPL: 32.1 (ref 7–25)
CALCIUM SPEC-SCNC: 8.3 MG/DL (ref 8.6–10.5)
CHLORIDE SERPL-SCNC: 95 MMOL/L (ref 98–107)
CO2 SERPL-SCNC: 29.3 MMOL/L (ref 22–29)
CREAT SERPL-MCNC: 2.49 MG/DL (ref 0.57–1)
CREAT UR-MCNC: 62.6 MG/DL
DEPRECATED RDW RBC AUTO: 72 FL (ref 37–54)
EOSINOPHIL # BLD AUTO: 0 10*3/MM3 (ref 0–0.4)
EOSINOPHIL NFR BLD AUTO: 0 % (ref 0.3–6.2)
ERYTHROCYTE [DISTWIDTH] IN BLOOD BY AUTOMATED COUNT: 21.7 % (ref 12.3–15.4)
GFR SERPL CREATININE-BSD FRML MDRD: 19 ML/MIN/1.73
GLUCOSE SERPL-MCNC: 133 MG/DL (ref 65–99)
HCT VFR BLD AUTO: 20.1 % (ref 34–46.6)
HCT VFR BLD AUTO: 27.4 % (ref 34–46.6)
HGB BLD-MCNC: 6.4 G/DL (ref 12–15.9)
HGB BLD-MCNC: 6.7 G/DL (ref 12–15.9)
HGB BLD-MCNC: 8.6 G/DL (ref 12–15.9)
IMM GRANULOCYTES # BLD AUTO: 0.04 10*3/MM3 (ref 0–0.05)
IMM GRANULOCYTES NFR BLD AUTO: 0.9 % (ref 0–0.5)
LYMPHOCYTES # BLD AUTO: 0.54 10*3/MM3 (ref 0.7–3.1)
LYMPHOCYTES NFR BLD AUTO: 12.4 % (ref 19.6–45.3)
MAGNESIUM SERPL-MCNC: 1.5 MG/DL (ref 1.6–2.4)
MAXIMAL PREDICTED HEART RATE: 147 BPM
MCH RBC QN AUTO: 29.6 PG (ref 26.6–33)
MCHC RBC AUTO-ENTMCNC: 31.8 G/DL (ref 31.5–35.7)
MCV RBC AUTO: 93.1 FL (ref 79–97)
MONOCYTES # BLD AUTO: 0.36 10*3/MM3 (ref 0.1–0.9)
MONOCYTES NFR BLD AUTO: 8.3 % (ref 5–12)
NEUTROPHILS NFR BLD AUTO: 3.41 10*3/MM3 (ref 1.7–7)
NEUTROPHILS NFR BLD AUTO: 78.2 % (ref 42.7–76)
NRBC BLD AUTO-RTO: 0 /100 WBC (ref 0–0.2)
PLAT MORPH BLD: NORMAL
PLATELET # BLD AUTO: 277 10*3/MM3 (ref 140–450)
PMV BLD AUTO: 12.9 FL (ref 6–12)
POIKILOCYTOSIS BLD QL SMEAR: NORMAL
POTASSIUM SERPL-SCNC: 3 MMOL/L (ref 3.5–5.2)
PROT UR-MCNC: 28 MG/DL
PROT/CREAT UR: 447.3 MG/G CREA (ref 0–200)
RBC # BLD AUTO: 2.16 10*6/MM3 (ref 3.77–5.28)
RH BLD: POSITIVE
RH BLD: POSITIVE
SODIUM SERPL-SCNC: 136 MMOL/L (ref 136–145)
STRESS TARGET HR: 125 BPM
T&S EXPIRATION DATE: NORMAL
WBC # BLD AUTO: 4.36 10*3/MM3 (ref 3.4–10.8)

## 2021-07-31 PROCEDURE — 25010000002 HEPARIN (PORCINE) PER 1000 UNITS: Performed by: INTERNAL MEDICINE

## 2021-07-31 PROCEDURE — 94799 UNLISTED PULMONARY SVC/PX: CPT

## 2021-07-31 PROCEDURE — P9016 RBC LEUKOCYTES REDUCED: HCPCS

## 2021-07-31 PROCEDURE — 85014 HEMATOCRIT: CPT | Performed by: INTERNAL MEDICINE

## 2021-07-31 PROCEDURE — 93306 TTE W/DOPPLER COMPLETE: CPT | Performed by: INTERNAL MEDICINE

## 2021-07-31 PROCEDURE — 85025 COMPLETE CBC W/AUTO DIFF WBC: CPT | Performed by: INTERNAL MEDICINE

## 2021-07-31 PROCEDURE — 86900 BLOOD TYPING SEROLOGIC ABO: CPT | Performed by: INTERNAL MEDICINE

## 2021-07-31 PROCEDURE — 83735 ASSAY OF MAGNESIUM: CPT | Performed by: INTERNAL MEDICINE

## 2021-07-31 PROCEDURE — 93010 ELECTROCARDIOGRAM REPORT: CPT | Performed by: INTERNAL MEDICINE

## 2021-07-31 PROCEDURE — 86850 RBC ANTIBODY SCREEN: CPT | Performed by: INTERNAL MEDICINE

## 2021-07-31 PROCEDURE — 99232 SBSQ HOSP IP/OBS MODERATE 35: CPT | Performed by: INTERNAL MEDICINE

## 2021-07-31 PROCEDURE — 86901 BLOOD TYPING SEROLOGIC RH(D): CPT

## 2021-07-31 PROCEDURE — 93005 ELECTROCARDIOGRAM TRACING: CPT | Performed by: INTERNAL MEDICINE

## 2021-07-31 PROCEDURE — 94640 AIRWAY INHALATION TREATMENT: CPT

## 2021-07-31 PROCEDURE — 36430 TRANSFUSION BLD/BLD COMPNT: CPT

## 2021-07-31 PROCEDURE — 86901 BLOOD TYPING SEROLOGIC RH(D): CPT | Performed by: INTERNAL MEDICINE

## 2021-07-31 PROCEDURE — 86923 COMPATIBILITY TEST ELECTRIC: CPT

## 2021-07-31 PROCEDURE — 86900 BLOOD TYPING SEROLOGIC ABO: CPT

## 2021-07-31 PROCEDURE — 80048 BASIC METABOLIC PNL TOTAL CA: CPT | Performed by: INTERNAL MEDICINE

## 2021-07-31 PROCEDURE — 85007 BL SMEAR W/DIFF WBC COUNT: CPT | Performed by: INTERNAL MEDICINE

## 2021-07-31 PROCEDURE — 85018 HEMOGLOBIN: CPT | Performed by: INTERNAL MEDICINE

## 2021-07-31 PROCEDURE — 93306 TTE W/DOPPLER COMPLETE: CPT

## 2021-07-31 RX ORDER — POTASSIUM CHLORIDE 750 MG/1
10 TABLET, FILM COATED, EXTENDED RELEASE ORAL DAILY
Status: CANCELLED | OUTPATIENT
Start: 2021-07-31

## 2021-07-31 RX ORDER — HYDRALAZINE HYDROCHLORIDE 25 MG/1
25 TABLET, FILM COATED ORAL 2 TIMES DAILY
COMMUNITY
End: 2021-08-06 | Stop reason: HOSPADM

## 2021-07-31 RX ORDER — DOCUSATE SODIUM 100 MG/1
100 CAPSULE, LIQUID FILLED ORAL 2 TIMES DAILY PRN
COMMUNITY

## 2021-07-31 RX ORDER — METOPROLOL SUCCINATE 50 MG/1
100 TABLET, EXTENDED RELEASE ORAL ONCE
Status: COMPLETED | OUTPATIENT
Start: 2021-07-31 | End: 2021-07-31

## 2021-07-31 RX ORDER — BUMETANIDE 1 MG/1
1 TABLET ORAL 2 TIMES DAILY
Status: CANCELLED | OUTPATIENT
Start: 2021-07-31

## 2021-07-31 RX ORDER — BUDESONIDE AND FORMOTEROL FUMARATE DIHYDRATE 160; 4.5 UG/1; UG/1
2 AEROSOL RESPIRATORY (INHALATION) DAILY
Status: DISCONTINUED | OUTPATIENT
Start: 2021-07-31 | End: 2021-08-06 | Stop reason: HOSPADM

## 2021-07-31 RX ORDER — OMEPRAZOLE 20 MG/1
20 CAPSULE, DELAYED RELEASE ORAL DAILY
COMMUNITY

## 2021-07-31 RX ORDER — METOPROLOL SUCCINATE 50 MG/1
100 TABLET, EXTENDED RELEASE ORAL 2 TIMES DAILY
Status: CANCELLED | OUTPATIENT
Start: 2021-07-31

## 2021-07-31 RX ORDER — DOCUSATE SODIUM 100 MG/1
100 CAPSULE, LIQUID FILLED ORAL 2 TIMES DAILY PRN
Status: DISCONTINUED | OUTPATIENT
Start: 2021-07-31 | End: 2021-08-06 | Stop reason: HOSPADM

## 2021-07-31 RX ORDER — ALBUTEROL SULFATE 2.5 MG/3ML
2.5 SOLUTION RESPIRATORY (INHALATION) EVERY 4 HOURS PRN
COMMUNITY

## 2021-07-31 RX ORDER — HYDRALAZINE HYDROCHLORIDE 25 MG/1
25 TABLET, FILM COATED ORAL EVERY 12 HOURS SCHEDULED
Status: CANCELLED | OUTPATIENT
Start: 2021-07-31

## 2021-07-31 RX ORDER — FUROSEMIDE 40 MG/1
40 TABLET ORAL DAILY
Status: CANCELLED | OUTPATIENT
Start: 2021-07-31

## 2021-07-31 RX ORDER — ALBUTEROL SULFATE 2.5 MG/3ML
2.5 SOLUTION RESPIRATORY (INHALATION) EVERY 4 HOURS PRN
Status: DISCONTINUED | OUTPATIENT
Start: 2021-07-31 | End: 2021-08-06 | Stop reason: HOSPADM

## 2021-07-31 RX ORDER — AMLODIPINE BESYLATE 10 MG/1
10 TABLET ORAL DAILY
COMMUNITY
End: 2021-08-06 | Stop reason: HOSPADM

## 2021-07-31 RX ORDER — FUROSEMIDE 40 MG/1
40 TABLET ORAL DAILY
COMMUNITY
End: 2021-08-06 | Stop reason: HOSPADM

## 2021-07-31 RX ORDER — SODIUM CHLORIDE 9 MG/ML
75 INJECTION, SOLUTION INTRAVENOUS CONTINUOUS
Status: ACTIVE | OUTPATIENT
Start: 2021-07-31 | End: 2021-07-31

## 2021-07-31 RX ORDER — FLUTICASONE PROPIONATE 50 MCG
2 SPRAY, SUSPENSION (ML) NASAL DAILY
Status: DISCONTINUED | OUTPATIENT
Start: 2021-07-31 | End: 2021-08-06 | Stop reason: HOSPADM

## 2021-07-31 RX ORDER — AMLODIPINE BESYLATE 10 MG/1
10 TABLET ORAL DAILY
Status: CANCELLED | OUTPATIENT
Start: 2021-07-31

## 2021-07-31 RX ORDER — PANTOPRAZOLE SODIUM 40 MG/1
40 TABLET, DELAYED RELEASE ORAL
Status: DISCONTINUED | OUTPATIENT
Start: 2021-07-31 | End: 2021-08-06 | Stop reason: HOSPADM

## 2021-07-31 RX ADMIN — PANTOPRAZOLE SODIUM 40 MG: 40 TABLET, DELAYED RELEASE ORAL at 17:24

## 2021-07-31 RX ADMIN — METOPROLOL SUCCINATE 100 MG: 50 TABLET, EXTENDED RELEASE ORAL at 21:56

## 2021-07-31 RX ADMIN — FLUTICASONE PROPIONATE 2 SPRAY: 50 SPRAY, METERED NASAL at 17:24

## 2021-07-31 RX ADMIN — HEPARIN SODIUM 5000 UNITS: 5000 INJECTION INTRAVENOUS; SUBCUTANEOUS at 05:28

## 2021-07-31 RX ADMIN — BUDESONIDE AND FORMOTEROL FUMARATE DIHYDRATE 2 PUFF: 160; 4.5 AEROSOL RESPIRATORY (INHALATION) at 16:37

## 2021-07-31 RX ADMIN — SODIUM CHLORIDE 75 ML/HR: 9 INJECTION, SOLUTION INTRAVENOUS at 02:11

## 2021-08-01 LAB
ANION GAP SERPL CALCULATED.3IONS-SCNC: 12.8 MMOL/L (ref 5–15)
BASOPHILS # BLD AUTO: 0.02 10*3/MM3 (ref 0–0.2)
BASOPHILS NFR BLD AUTO: 0.4 % (ref 0–1.5)
BH BB BLOOD EXPIRATION DATE: NORMAL
BH BB BLOOD TYPE BARCODE: 7300
BH BB DISPENSE STATUS: NORMAL
BH BB PRODUCT CODE: NORMAL
BH BB UNIT NUMBER: NORMAL
BUN SERPL-MCNC: 53 MG/DL (ref 8–23)
BUN/CREAT SERPL: 34.2 (ref 7–25)
CALCIUM SPEC-SCNC: 8.7 MG/DL (ref 8.6–10.5)
CHLORIDE SERPL-SCNC: 99 MMOL/L (ref 98–107)
CO2 SERPL-SCNC: 28.2 MMOL/L (ref 22–29)
CREAT SERPL-MCNC: 1.55 MG/DL (ref 0.57–1)
CROSSMATCH INTERPRETATION: NORMAL
DEPRECATED RDW RBC AUTO: 65.3 FL (ref 37–54)
EOSINOPHIL # BLD AUTO: 0 10*3/MM3 (ref 0–0.4)
EOSINOPHIL NFR BLD AUTO: 0 % (ref 0.3–6.2)
ERYTHROCYTE [DISTWIDTH] IN BLOOD BY AUTOMATED COUNT: 20 % (ref 12.3–15.4)
GFR SERPL CREATININE-BSD FRML MDRD: 33 ML/MIN/1.73
GLUCOSE BLDC GLUCOMTR-MCNC: 104 MG/DL (ref 70–130)
GLUCOSE SERPL-MCNC: 143 MG/DL (ref 65–99)
HCT VFR BLD AUTO: 26 % (ref 34–46.6)
HGB BLD-MCNC: 8.3 G/DL (ref 12–15.9)
IMM GRANULOCYTES # BLD AUTO: 0.03 10*3/MM3 (ref 0–0.05)
IMM GRANULOCYTES NFR BLD AUTO: 0.6 % (ref 0–0.5)
LYMPHOCYTES # BLD AUTO: 0.57 10*3/MM3 (ref 0.7–3.1)
LYMPHOCYTES NFR BLD AUTO: 11.5 % (ref 19.6–45.3)
MAGNESIUM SERPL-MCNC: 1.4 MG/DL (ref 1.6–2.4)
MCH RBC QN AUTO: 29.6 PG (ref 26.6–33)
MCHC RBC AUTO-ENTMCNC: 31.9 G/DL (ref 31.5–35.7)
MCV RBC AUTO: 92.9 FL (ref 79–97)
MONOCYTES # BLD AUTO: 0.43 10*3/MM3 (ref 0.1–0.9)
MONOCYTES NFR BLD AUTO: 8.7 % (ref 5–12)
NEUTROPHILS NFR BLD AUTO: 3.92 10*3/MM3 (ref 1.7–7)
NEUTROPHILS NFR BLD AUTO: 78.8 % (ref 42.7–76)
NRBC BLD AUTO-RTO: 0 /100 WBC (ref 0–0.2)
PLATELET # BLD AUTO: 342 10*3/MM3 (ref 140–450)
PMV BLD AUTO: 12.2 FL (ref 6–12)
POTASSIUM SERPL-SCNC: 2.9 MMOL/L (ref 3.5–5.2)
QT INTERVAL: 278 MS
QT INTERVAL: 282 MS
QTC INTERVAL: 409 MS
QTC INTERVAL: 421 MS
RBC # BLD AUTO: 2.8 10*6/MM3 (ref 3.77–5.28)
SODIUM SERPL-SCNC: 140 MMOL/L (ref 136–145)
UNIT  ABO: NORMAL
UNIT  RH: NORMAL
WBC # BLD AUTO: 4.97 10*3/MM3 (ref 3.4–10.8)

## 2021-08-01 PROCEDURE — 94799 UNLISTED PULMONARY SVC/PX: CPT

## 2021-08-01 PROCEDURE — 82962 GLUCOSE BLOOD TEST: CPT

## 2021-08-01 PROCEDURE — 94640 AIRWAY INHALATION TREATMENT: CPT

## 2021-08-01 PROCEDURE — 80048 BASIC METABOLIC PNL TOTAL CA: CPT | Performed by: INTERNAL MEDICINE

## 2021-08-01 PROCEDURE — 25010000002 MAGNESIUM SULFATE 2 GM/50ML SOLUTION: Performed by: INTERNAL MEDICINE

## 2021-08-01 PROCEDURE — 83735 ASSAY OF MAGNESIUM: CPT | Performed by: INTERNAL MEDICINE

## 2021-08-01 PROCEDURE — 85025 COMPLETE CBC W/AUTO DIFF WBC: CPT | Performed by: INTERNAL MEDICINE

## 2021-08-01 PROCEDURE — 99232 SBSQ HOSP IP/OBS MODERATE 35: CPT | Performed by: INTERNAL MEDICINE

## 2021-08-01 RX ORDER — MAGNESIUM SULFATE HEPTAHYDRATE 40 MG/ML
2 INJECTION, SOLUTION INTRAVENOUS ONCE
Status: COMPLETED | OUTPATIENT
Start: 2021-08-01 | End: 2021-08-01

## 2021-08-01 RX ADMIN — MAGNESIUM SULFATE HEPTAHYDRATE 2 G: 40 INJECTION, SOLUTION INTRAVENOUS at 13:26

## 2021-08-01 RX ADMIN — POTASSIUM CHLORIDE 30 MEQ: 20 TABLET, EXTENDED RELEASE ORAL at 16:12

## 2021-08-01 RX ADMIN — PANTOPRAZOLE SODIUM 40 MG: 40 TABLET, DELAYED RELEASE ORAL at 05:36

## 2021-08-01 RX ADMIN — POTASSIUM CHLORIDE 30 MEQ: 20 TABLET, EXTENDED RELEASE ORAL at 12:38

## 2021-08-01 RX ADMIN — SODIUM CHLORIDE, PRESERVATIVE FREE 10 ML: 5 INJECTION INTRAVENOUS at 09:06

## 2021-08-01 RX ADMIN — ALBUTEROL SULFATE 2.5 MG: 2.5 SOLUTION RESPIRATORY (INHALATION) at 18:37

## 2021-08-01 RX ADMIN — BUDESONIDE AND FORMOTEROL FUMARATE DIHYDRATE 2 PUFF: 160; 4.5 AEROSOL RESPIRATORY (INHALATION) at 07:46

## 2021-08-01 RX ADMIN — FLUTICASONE PROPIONATE 2 SPRAY: 50 SPRAY, METERED NASAL at 09:06

## 2021-08-01 NOTE — PROGRESS NOTES
[facility] HOSPITALIST PROGRESS NOTE     Patient Identification:  Name:  Ale Gupta  Age:  75 y.o.  Sex:  female  :  1945  MRN:  [id number]  Visit Number:  [id number]  ROOM: [address] [place] [address]     Primary Care Provider:  Neema Durbin APRN    Length of stay in inpatient status:  2    Subjective     Chief Compliant:    Chief Complaint   Patient presents with   • Dehydration       History of Presenting Illness:    Patient reports feeling well today. Still has not been out of bed. Reports good urine output. Denies any signs of bleeding. She reports she has had BMs that are non-bloody.     ROS:  Otherwise 10 point ROS negative other than documented above in HPI.     Objective     Current Hospital Meds:budesonide-formoterol, 2 puff, Inhalation, Daily  fluticasone, 2 spray, Nasal, Daily  pantoprazole, 40 mg, Oral, Q AM  potassium chloride, 30 mEq, Oral, Q4H  sodium chloride, 10 mL, Intravenous, Q12H         Current Antimicrobial Therapy:  Anti-Infectives (From admission, onward)    None        Current Diuretic Therapy:  Diuretics (From admission, onward)    None        ----------------------------------------------------------------------------------------------------------------------  Vital Signs:  Temp:  [98.3 °F (36.8 °C)-98.9 °F (37.2 °C)] 98.8 °F (37.1 °C)  Heart Rate:  [] 95  Resp:  [18-20] 20  BP: (113-144)/(47-67) 143/61  SpO2:  [91 %-98 %] 91 %  on   ;   Device (Oxygen Therapy): room air  Body mass index is 20.64 kg/m².    Wt Readings from Last 3 Encounters:   21 30.7 kg (67 lb 9.6 oz)   05/10/21 35.3 kg (77 lb 14.4 oz)   21 34.9 kg (77 lb)     Intake & Output (last 3 days)        -  0700    P.O.  580 860 240    Blood   300     IV Piggyback  500      Total Intake(mL/kg)  1080 (35.2) 1160 (37.8) 240 (7.8)    Urine (mL/kg/hr)  1000      Total Output  1000      Net  +80 +1160 +240             Urine Unmeasured Occurrence   6 x 2 x    Stool Unmeasured Occurrence   1 x         Diet Regular  ----------------------------------------------------------------------------------------------------------------------  Physical exam:  Constitutional:  Well-developed and well-nourished.  No respiratory distress. Short stature.      HENT:  Head:  Normocephalic and atraumatic.  Mouth:  Moist mucous membranes.    Eyes:  Conjunctivae and EOM are normal. No scleral icterus.    Neck:  Neck supple.  No JVD present.    Cardiovascular:  Normal rate, regular rhythm and normal heart sounds with no murmur.  Pulmonary/Chest:  No respiratory distress, no wheezes, no crackles, with normal breath sounds and good air movement.  Abdominal:  Soft.  Bowel sounds are normal.  No distension and no tenderness.   Musculoskeletal:  No edema, no tenderness, and no deformity.  No red or swollen joints anywhere.    Neurological:  Alert and oriented to person, place, and time.  No cranial nerve deficit.  No tongue deviation.  No facial droop.  No slurred speech.   Skin:  Skin is warm and dry. No rash noted. No pallor.   Peripheral vascular:  Pulses in all 4 extremities with no clubbing, no cyanosis, no edema.  ----------------------------------------------------------------------------------------------------------------------  Tele:    ----------------------------------------------------------------------------------------------------------------------  Results from last 7 days   Lab Units 08/01/21  0926 07/31/21  1830 07/31/21  0849 07/31/21  0651 07/31/21  0651 07/30/21  1545 07/30/21  1523 07/30/21  1523   WBC 10*3/mm3 4.97  --   --   --  4.36  --   --  8.54   HEMOGLOBIN g/dL 8.3* 8.6* 6.7*   < > 6.4*  --    < > 7.5*   HEMATOCRIT % 26.0* 27.4*  --   --  20.1*  --    < > 23.7*   MCV fL 92.9  --   --   --  93.1  --   --  90.8   MCHC g/dL 31.9  --   --   --  31.8  --   --  31.6   PLATELETS 10*3/mm3 342  --   --   --  277  --   --  419   INR    --   --   --   --   --  1.13*  --   --     < > = values in this interval not displayed.         Results from last 7 days   Lab Units 08/01/21  0926 07/31/21  0849 07/31/21  0651 07/30/21  1523 07/29/21  1038 07/29/21  1038   SODIUM mmol/L 140  --  136 134*   < > 137   POTASSIUM mmol/L 2.9*  --  3.0* 3.5   < > 3.5   MAGNESIUM mg/dL 1.4* 1.5*  --   --   --   --    CHLORIDE mmol/L 99  --  95* 88*   < > 88*   CO2 mmol/L 28.2  --  29.3* 32.8*   < > 33.3*   BUN mg/dL 53*  --  80* 89*   < > 80*   CREATININE mg/dL 1.55*  --  2.49* 3.07*   < > 2.34*   EGFR IF NONAFRICN AM mL/min/1.73 33*  --  19* 15*   < > 20*   CALCIUM mg/dL 8.7  --  8.3* 9.2   < > 9.0   GLUCOSE mg/dL 143*  --  133* 129*   < > 110*   ALBUMIN g/dL  --   --   --  4.18  --  4.40   BILIRUBIN mg/dL  --   --   --  1.1  --  1.1   ALK PHOS U/L  --   --   --  111  --  99   AST (SGOT) U/L  --   --   --  15  --  16   ALT (SGPT) U/L  --   --   --  14  --  19    < > = values in this interval not displayed.   Estimated Creatinine Clearance: 15.7 mL/min (A) (by C-G formula based on SCr of 1.55 mg/dL (H)).  No results found for: AMMONIA  Results from last 7 days   Lab Units 07/30/21  1523   CK TOTAL U/L 16*     Results from last 7 days   Lab Units 07/29/21  1038   PROBNP pg/mL 4,328.0*         Glucose   Date/Time Value Ref Range Status   08/01/2021 0705 104 70 - 130 mg/dL Final     Comment:     Meter: [id number] : [id number] ANNETTE PYLE     Lab Results   Component Value Date    TSH 1.770 05/28/2019     No results found for: PREGTESTUR, PREGSERUM, HCG, HCGQUANT  Pain Management Panel     Pain Management Panel Latest Ref Rng & Units 7/30/2021    CREATININE UR mg/dL 62.6        Brief Urine Lab Results  (Last result in the past 365 days)      Color   Clarity   Blood   Leuk Est   Nitrite   Protein   CREAT   Urine HCG        07/30/21 1546             62.6       07/30/21 1546 Yellow Clear Negative Negative Negative 30 mg/dL (1+)             No results found for:  BLOODCX  No results found for: URINECX  No results found for: WOUNDCX  No results found for: STOOLCX  No results found for: RESPCX  No results found for: AFBCX        I have personally looked at the labs and they are summarized above.  ----------------------------------------------------------------------------------------------------------------------  Detailed radiology reports for the last 24 hours:    Imaging Results (Last 24 Hours)     ** No results found for the last 24 hours. **        Assessment & Plan    #KENNEDY on CKD  #Proteinuria   - Baseline Cr unclear but was recently in 1.5-2.4 around recent hospitalization.   - Patient recently admitted for what was called cardiorenal syndrome requiring BiPAP and diuresis. Was taking both lasix and bumex at home.   - Cr on presentation 3.07.   - Patient appeared dry on presentaiton and was given 500 cc bolus and 75 cc/hr for 10 hours.   - Rosa 26 supportive of prerenal etiology.   - No obstructive process evident on CT.   - Cr improved to 1.59 today. Likely at baseline and appears euvolemic. Will hold any further IVF at t his time.     #Left adrenal cyst vs. Nodule   - Outpatient monitoring      #AOCD/AOCKD  - Not iron deficient on recent labs  - Hgb baseline 9-10  - Hemolgobin trended down from 7.5 to 6.4 on admission. Suspect dilutional.   - No signs of bleeding.   - Will give 1 unit of pRBC and continue to monitor   - GI was planning outpatient endoscopy. Will continue plan or consult surgery if there is evidence of active bleeding.   - Hemoglobin stable at 8.3 today. Will repeat in AM.   - Change DVT prophylaxis to SCDs     #HTN  - Restart home regimen      #GERD  - Restart home regimen      #COPD  - Restart home regimen      #Hx of diastolic heart failure   #Moderate pulmonary HTN  - Last echo here 7/31/2019 revealed normal LVEF, grade 1 a diastolic dysfunction   - Hold diuretics in setting of KENNEDY  - Cautiously giving fluids as above.   - Repeat echo revealed normal  LVEF. Grade 1 diastolic dysfunciton.     #Hypokalemia  #Hypomagnesemia  - Replaced      F: PO  E: Replace as needed   N: Regular      Code status: Full      Dispo: Pending clinical improvement. Lives with her sister. PT/OT pending.       VTE Prophylaxis:   Mechanical Order History:      Ordered        07/31/21 0827  Place Sequential Compression Device  Once         07/31/21 0827  Maintain Sequential Compression Device  Continuous                 Pharmalogical Order History:      Ordered     Dose Route Frequency Stop    07/30/21 1925  heparin (porcine) 5000 UNIT/ML injection 5,000 Units  Status:  Discontinued      5,000 Units SC Every 8 Hours Scheduled 07/31/21 0827                  Félix Moss MD  [facility] Hospitalist  08/01/21  14:10 EDT

## 2021-08-01 NOTE — PLAN OF CARE
Goal Outcome Evaluation:  Plan of Care Reviewed With: patient        Progress: no change  Outcome Summary: Pt rested well tonight. HR 130s resolved with home medication. please see tele strips/ EKG. pt po water intake increased.

## 2021-08-01 NOTE — PLAN OF CARE
Goal Outcome Evaluation:  Plan of Care Reviewed With: patient           Outcome Summary: Patient has done well today she has rested well her potasisum and magnesium are being replaced. No new complaints noted

## 2021-08-02 LAB
ANION GAP SERPL CALCULATED.3IONS-SCNC: 10.7 MMOL/L (ref 5–15)
BASOPHILS # BLD AUTO: 0.01 10*3/MM3 (ref 0–0.2)
BASOPHILS NFR BLD AUTO: 0.2 % (ref 0–1.5)
BUN SERPL-MCNC: 43 MG/DL (ref 8–23)
BUN/CREAT SERPL: 29.9 (ref 7–25)
CALCIUM SPEC-SCNC: 8.7 MG/DL (ref 8.6–10.5)
CHLORIDE SERPL-SCNC: 104 MMOL/L (ref 98–107)
CO2 SERPL-SCNC: 25.3 MMOL/L (ref 22–29)
CREAT SERPL-MCNC: 1.44 MG/DL (ref 0.57–1)
DEPRECATED RDW RBC AUTO: 69.1 FL (ref 37–54)
EOSINOPHIL # BLD AUTO: 0 10*3/MM3 (ref 0–0.4)
EOSINOPHIL NFR BLD AUTO: 0 % (ref 0.3–6.2)
ERYTHROCYTE [DISTWIDTH] IN BLOOD BY AUTOMATED COUNT: 20.5 % (ref 12.3–15.4)
GFR SERPL CREATININE-BSD FRML MDRD: 36 ML/MIN/1.73
GLUCOSE SERPL-MCNC: 111 MG/DL (ref 65–99)
HCT VFR BLD AUTO: 24.8 % (ref 34–46.6)
HGB BLD-MCNC: 7.8 G/DL (ref 12–15.9)
IMM GRANULOCYTES # BLD AUTO: 0.04 10*3/MM3 (ref 0–0.05)
IMM GRANULOCYTES NFR BLD AUTO: 1 % (ref 0–0.5)
LYMPHOCYTES # BLD AUTO: 0.48 10*3/MM3 (ref 0.7–3.1)
LYMPHOCYTES NFR BLD AUTO: 12 % (ref 19.6–45.3)
MAGNESIUM SERPL-MCNC: 2.1 MG/DL (ref 1.6–2.4)
MCH RBC QN AUTO: 29.9 PG (ref 26.6–33)
MCHC RBC AUTO-ENTMCNC: 31.5 G/DL (ref 31.5–35.7)
MCV RBC AUTO: 95 FL (ref 79–97)
MONOCYTES # BLD AUTO: 0.32 10*3/MM3 (ref 0.1–0.9)
MONOCYTES NFR BLD AUTO: 8 % (ref 5–12)
NEUTROPHILS NFR BLD AUTO: 3.16 10*3/MM3 (ref 1.7–7)
NEUTROPHILS NFR BLD AUTO: 78.8 % (ref 42.7–76)
NRBC BLD AUTO-RTO: 0 /100 WBC (ref 0–0.2)
PLATELET # BLD AUTO: 316 10*3/MM3 (ref 140–450)
PMV BLD AUTO: 11.8 FL (ref 6–12)
POTASSIUM SERPL-SCNC: 4.6 MMOL/L (ref 3.5–5.2)
RBC # BLD AUTO: 2.61 10*6/MM3 (ref 3.77–5.28)
SODIUM SERPL-SCNC: 140 MMOL/L (ref 136–145)
WBC # BLD AUTO: 4.01 10*3/MM3 (ref 3.4–10.8)

## 2021-08-02 PROCEDURE — 99232 SBSQ HOSP IP/OBS MODERATE 35: CPT | Performed by: HOSPITALIST

## 2021-08-02 PROCEDURE — 85025 COMPLETE CBC W/AUTO DIFF WBC: CPT | Performed by: INTERNAL MEDICINE

## 2021-08-02 PROCEDURE — 94799 UNLISTED PULMONARY SVC/PX: CPT

## 2021-08-02 PROCEDURE — 97166 OT EVAL MOD COMPLEX 45 MIN: CPT | Performed by: OCCUPATIONAL THERAPIST

## 2021-08-02 PROCEDURE — 97162 PT EVAL MOD COMPLEX 30 MIN: CPT

## 2021-08-02 PROCEDURE — 83735 ASSAY OF MAGNESIUM: CPT | Performed by: INTERNAL MEDICINE

## 2021-08-02 PROCEDURE — 80048 BASIC METABOLIC PNL TOTAL CA: CPT | Performed by: INTERNAL MEDICINE

## 2021-08-02 RX ORDER — METOPROLOL SUCCINATE 50 MG/1
100 TABLET, EXTENDED RELEASE ORAL
Status: DISCONTINUED | OUTPATIENT
Start: 2021-08-02 | End: 2021-08-06 | Stop reason: HOSPADM

## 2021-08-02 RX ORDER — METOPROLOL SUCCINATE 100 MG/1
100 TABLET, EXTENDED RELEASE ORAL DAILY
Start: 2021-08-02 | End: 2021-08-06

## 2021-08-02 RX ORDER — BUMETANIDE 1 MG/1
1 TABLET ORAL 2 TIMES DAILY
Status: DISCONTINUED | OUTPATIENT
Start: 2021-08-02 | End: 2021-08-03

## 2021-08-02 RX ADMIN — FLUTICASONE PROPIONATE 2 SPRAY: 50 SPRAY, METERED NASAL at 08:35

## 2021-08-02 RX ADMIN — SODIUM CHLORIDE, PRESERVATIVE FREE 10 ML: 5 INJECTION INTRAVENOUS at 08:34

## 2021-08-02 RX ADMIN — ALBUTEROL SULFATE 2.5 MG: 2.5 SOLUTION RESPIRATORY (INHALATION) at 19:15

## 2021-08-02 RX ADMIN — SODIUM CHLORIDE, PRESERVATIVE FREE 10 ML: 5 INJECTION INTRAVENOUS at 22:10

## 2021-08-02 RX ADMIN — BUMETANIDE 1 MG: 1 TABLET ORAL at 22:11

## 2021-08-02 RX ADMIN — PANTOPRAZOLE SODIUM 40 MG: 40 TABLET, DELAYED RELEASE ORAL at 05:11

## 2021-08-02 RX ADMIN — BUMETANIDE 1 MG: 1 TABLET ORAL at 14:52

## 2021-08-02 RX ADMIN — METOPROLOL SUCCINATE 100 MG: 50 TABLET, EXTENDED RELEASE ORAL at 14:52

## 2021-08-02 RX ADMIN — BUDESONIDE AND FORMOTEROL FUMARATE DIHYDRATE 2 PUFF: 160; 4.5 AEROSOL RESPIRATORY (INHALATION) at 07:57

## 2021-08-02 RX ADMIN — ALBUTEROL SULFATE 2.5 MG: 2.5 SOLUTION RESPIRATORY (INHALATION) at 07:50

## 2021-08-02 NOTE — PLAN OF CARE
Goal Outcome Evaluation:           Progress: no change  Outcome Summary: Pt has done well today. Had planned for discharge home but patient had a run of PSVT in the 150s, Dr Warren made aware, discharge cancelled and medications adjusted.  Pt heart rate returned to the low 100s, remained asymptomatic. Family aware

## 2021-08-02 NOTE — THERAPY EVALUATION
Acute Care - Occupational Therapy Initial Evaluation   [facility]     Patient Name: Ale Gupta  : 1945  MRN: [id number]  Today's Date: 2021  Onset of Illness/Injury or Date of Surgery: 21  Date of Referral to OT: 21  Referring Physician: Dr. Moss    Admit Date: 2021       ICD-10-CM ICD-9-CM   1. Acute renal failure, unspecified acute renal failure type (CMS/HCC)  N17.9 584.9     Patient Active Problem List   Diagnosis   • Closed bimalleolar fracture of right ankle   • Essential hypertension   • Chronic allergic rhinitis   • Gastroesophageal reflux disease without esophagitis   • Bimalleolar fracture, right, closed, with routine healing, subsequent encounter   • Closed fracture of left distal radius   • Essential tremor   • COPD mixed type (CMS/HCC)   • Acute kidney injury (CMS/HCC)   • Congestive heart failure (CMS/HCC)   • Acute renal failure (ARF) (CMS/HCC)     Past Medical History:   Diagnosis Date   • Allergy    • Arthritis    • Asthma    • Congestive heart failure (CHF) (CMS/HCC)    • COPD (chronic obstructive pulmonary disease) (CMS/HCC)    • GERD (gastroesophageal reflux disease)    • Hypertension    • Stroke (CMS/HCC)     TIA     Past Surgical History:   Procedure Laterality Date   • CATARACT EXTRACTION     • COLONOSCOPY     • ENDOSCOPY     • FRACTURE SURGERY      LEFT ARM    • HERNIA REPAIR     • ORIF ULNA/RADIUS FRACTURES Left 2019    Procedure: ULNA/RADIUS OPEN REDUCTION INTERNAL FIXATION, radius only;  Surgeon: Maverick Stevenson MD;  Location: [facility];  Service: Orthopedics            OT ASSESSMENT FLOWSHEET (last 12 hours)      OT Evaluation and Treatment     Row Name 21 1139                   OT Time and Intention    Subjective Information  complains of;weakness  -BF        Document Type  evaluation  -BF        Mode of Treatment  occupational therapy  -BF        Patient Effort  good  -BF           General Information    Patient Profile Reviewed  yes  -BF      "   Onset of Illness/Injury or Date of Surgery  07/30/21  -BF        Referring Physician  Dr. Moss  -BF        Patient/Family/Caregiver Comments/Observations  Pt supine in bed, agreeable to OT  -BF        Prior Level of Function  min assist: Pt reports \"they won't let me\" perform ADLs independently  -BF        Equipment Currently Used at Home  -- Pt reports BSC, rolling walker, and w/c  -BF        Pertinent History of Current Functional Problem  Pt presented to hospital with dehydration  -BF        Existing Precautions/Restrictions  fall  -BF        Limitations/Impairments  safety/cognitive  -BF           Previous Level of Function/Home Environm    BADLs, Premorbid Functional Level  needs assistive device for safe performance  -BF           Living Environment    Current Living Arrangements  home/apartment/condo  -BF        Lives With  sibling(s)  -BF           Cognition    Affect/Mental Status (Cognitive)  confused  -BF        Orientation Status (Cognition)  oriented to;person  -BF        Follows Commands (Cognition)  follows one-step commands;verbal cues/prompting required;repetition of directions required  -BF           Range of Motion (ROM)    Range of Motion  bilateral upper extremities;ROM is WFL  -BF           Strength Comprehensive (MMT)    Comment, General Manual Muscle Testing (MMT) Assessment  BUE grossly 3+/5  -BF           Motor Skills    Motor Skills  coordination  -BF        Coordination  fine motor deficit;bilateral Tremors  -BF           Activities of Daily Living    BADL Assessment/Intervention  bathing;upper body dressing;lower body dressing;grooming;feeding;toileting  -BF           Bathing Assessment/Intervention    Comment (Bathing)  Min A  -BF           Upper Body Dressing Assessment/Training    Comment (Upper Body Dressing)  Set-up/supervision  -BF           Lower Body Dressing Assessment/Training    Comment (Lower Body Dressing)  Min A  -BF           Grooming Assessment/Training    Comment " (Grooming)  Set-up/supervision  -BF           Self-Feeding Assessment/Training    Comment (Feeding)  Set-up/Min A secondary to tremors  -BF           Toileting Assessment/Training    Comment (Toileting)  Min A  -BF           BADL Safety/Performance    Impairments, BADL Safety/Performance  cognition;endurance/activity tolerance;coordination;motor control;strength  -BF           OT Goals    Strength Goal Selection (OT)  strength, OT goal 1  -BF           Strength Goal 1 (OT)    Strength Goal 1 (OT)  Increase BUE MMT to 4-/5 to enhance independence with ADLs.   -BF        Time Frame (Strength Goal 1, OT)  by discharge  -BF           Positioning and Restraints    Post Treatment Position  bed  -BF        In Bed  supine;call light within reach;encouraged to call for assist  -BF           Therapy Assessment/Plan (OT)    Date of Referral to OT  08/01/21  -BF        Patient/Family Therapy Goal Statement (OT)  Dr. Warren  -BF        OT Diagnosis  Debility  -BF        Rehab Potential (OT)  good, to achieve stated therapy goals  -BF        Criteria for Skilled Therapeutic Interventions Met (OT)  yes  -BF        Predicted Duration of Therapy Intervention (OT)  1 week  -BF        Problem List (OT)  problems related to;coordination;motor control;mobility;strength  -BF        Activity Limitations Related to Problem List (OT)  unable to transfer safely;BADLs not performed adequately or safely  -BF        Planned Therapy Interventions (OT)  activity tolerance training;BADL retraining;ROM/therapeutic exercise;strengthening exercise;transfer/mobility retraining  -BF           Therapy Plan Review/Discharge Plan (OT)    Therapy Plan Review (OT)  patient  -BF        Anticipated Discharge Disposition (OT)  -- TBD  -BF          User Key  (r) = Recorded By, (t) = Taken By, (c) = Cosigned By    Initials Name Effective Dates    BF Rachel Longo, OT 06/16/21 -                  OT Recommendation and Plan  Planned Therapy Interventions  (OT): activity tolerance training, BADL retraining, ROM/therapeutic exercise, strengthening exercise, transfer/mobility retraining           Time Calculation:     Therapy Charges for Today     Code Description Service Date Service Provider Modifiers Qty    98131025013  OT EVAL MOD COMPLEXITY 4 8/2/2021 Rachel Longo, KATHRIN GO 1               Rachel Longo OT  8/2/2021

## 2021-08-02 NOTE — THERAPY EVALUATION
Acute Care - Physical Therapy Initial Evaluation   [facility]     Patient Name: Ale Gupta  : 1945  MRN: [id number]  Today's Date: 2021   Onset of Illness/Injury or Date of Surgery: 21  Visit Dx:     ICD-10-CM ICD-9-CM   1. Acute renal failure, unspecified acute renal failure type (CMS/HCC)  N17.9 584.9     Patient Active Problem List   Diagnosis   • Closed bimalleolar fracture of right ankle   • Essential hypertension   • Chronic allergic rhinitis   • Gastroesophageal reflux disease without esophagitis   • Bimalleolar fracture, right, closed, with routine healing, subsequent encounter   • Closed fracture of left distal radius   • Essential tremor   • COPD mixed type (CMS/HCC)   • Acute kidney injury (CMS/HCC)   • Congestive heart failure (CMS/HCC)   • Acute renal failure (ARF) (CMS/HCC)     Past Medical History:   Diagnosis Date   • Allergy    • Arthritis    • Asthma    • Congestive heart failure (CHF) (CMS/HCC)    • COPD (chronic obstructive pulmonary disease) (CMS/HCC)    • GERD (gastroesophageal reflux disease)    • Hypertension    • Stroke (CMS/HCC)     TIA     Past Surgical History:   Procedure Laterality Date   • CATARACT EXTRACTION     • COLONOSCOPY     • ENDOSCOPY     • FRACTURE SURGERY      LEFT ARM    • HERNIA REPAIR     • ORIF ULNA/RADIUS FRACTURES Left 2019    Procedure: ULNA/RADIUS OPEN REDUCTION INTERNAL FIXATION, radius only;  Surgeon: Maverick Stevenson MD;  Location: [facility];  Service: Orthopedics        PT Assessment (last 12 hours)      PT Evaluation and Treatment     Row Name 21 1102          Physical Therapy Time and Intention    Subjective Information  no complaints  -CW     Document Type  discharge evaluation/summary  -CW     Mode of Treatment  physical therapy  -CW     Patient Effort  good  -CW     Symptoms Noted During/After Treatment  none  -CW     Comment  Patient agreeable to physical therapy evaluation this date. She states she is feeling okay and has  minimal subjective input.  -CW     Row Name 08/02/21 1102          General Information    Patient Profile Reviewed  yes  -CW     Onset of Illness/Injury or Date of Surgery  07/30/21  -CW     Referring Physician  Ajay  -CW     Patient Observations  cooperative;agree to therapy;lethargic  -CW     Prior Level of Function  min assist:  -CW     Equipment Currently Used at Home  -- Difficult to ascertain  -CW     Limitations/Impairments  safety/cognitive  -CW     Barriers to Rehab  previous functional deficit;cognitive status  -CW     Row Name 08/02/21 1102          Previous Level of Function/Home Environm    BADLs, Premorbid Functional Level  needs assistance for safe performance  -CW     Household Ambulation, Premorbid Functional Level  independent;needs assistance for safe performance  -CW     Row Name 08/02/21 1102          Living Environment    Current Living Arrangements  home/apartment/condo  -CW     Lives With  sibling(s)  -CW     Row Name 08/02/21 1102          Home Use of Assistive/Adaptive Equipment    Equipment Currently Used at Home  none  -CW     Row Name 08/02/21 1102          Cognition    Affect/Mental Status (Cognitive)  flat/blunted affect  -CW     Orientation Status (Cognition)  oriented to;person;verbal cues/prompts needed for orientation;place;situation  -CW     Follows Commands (Cognition)  follows one-step commands;physical/tactile prompts required;verbal cues/prompting required  -CW     Row Name 08/02/21 1102          Pain Scale: Word Pre/Post-Treatment    Pre/Posttreatment Pain Comment  Patient reports no pain during physical therapy evaluation this date.  -CW     Row Name 08/02/21 1102          Range of Motion Comprehensive    Comment, General Range of Motion  AROM grossly WFL  -CW     Row Name 08/02/21 1102          Strength Comprehensive (MMT)    Comment, General Manual Muscle Testing (MMT) Assessment  BLE MMT grossly 4/5  -CW     Row Name 08/02/21 1102          Bed Mobility    Bed Mobility   bed mobility (all) activities  -CW     All Activities, Hughes (Bed Mobility)  contact guard assist  -     Assistive Device (Bed Mobility)  bed rails;head of bed elevated  -     Row Name 08/02/21 1102          Transfers    Transfers  sit-stand transfer;stand-sit transfer  -CW     Sit-Stand Hughes (Transfers)  standby assist  -CW     Stand-Sit Hughes (Transfers)  standby assist  -     Row Name 08/02/21 1102          Gait/Stairs (Locomotion)    Hughes Level (Gait)  contact guard;nonverbal cues (demo/gesture)  -     Distance in Feet (Gait)  80  -CW     Pattern (Gait)  step-to;step-through  -CW     Deviations/Abnormal Patterns (Gait)  base of support, narrow;gait speed decreased;scissoring;stride length decreased  -     Bilateral Gait Deviations  heel strike decreased  -     Row Name 08/02/21 1102          Safety Issues, Functional Mobility    Safety Issues Affecting Function (Mobility)  awareness of need for assistance;impulsivity;insight into deficits/self-awareness;judgment;problem-solving  -     Impairments Affecting Function (Mobility)  balance;cognition;endurance/activity tolerance;strength  -     Row Name 08/02/21 1102          Balance    Balance Assessment  sitting static balance;standing static balance  -     Static Sitting Balance  mild impairment Related to patient's height/bed height  -     Static Standing Balance  mild impairment  -     Row Name 08/02/21 1102          Coping    Observed Emotional State  calm;cooperative  -     Trust Relationship/Rapport  care explained;choices provided;thoughts/feelings acknowledged  -     Row Name 08/02/21 1102          Positioning and Restraints    Pre-Treatment Position  in bed  -CW     Post Treatment Position  bed  -CW     In Bed  supine;call light within reach;encouraged to call for assist;exit alarm on  -     Row Name 08/02/21 1102          Therapy Assessment/Plan (PT)    Patient/Family Therapy Goals Statement (PT)   Patient reports no goals.  -CW     Functional Level at Time of Evaluation (PT)  Patient demonstrates mild functional mobility deficits likely due to decreased activity level associated with ongoing medical issues. She will benefit from ambulation and up to chair for meals with nursing, but is limited in her ability to meaningfully particiipate with skilled physical therapy.  -CW     Criteria for Skilled Interventions Met (PT)  does not meet criteria for skilled intervention  -CW     Row Name 08/02/21 1102          Therapy Plan Review/Discharge Plan (PT)    Therapy Plan Review (PT)  evaluation/treatment results reviewed  -CW       User Key  (r) = Recorded By, (t) = Taken By, (c) = Cosigned By    Initials Name Provider Type    Quinten Ansari PT Physical Therapist          PT Recommendation and Plan  Anticipated Discharge Disposition (PT): home with assist          Time Calculation:   PT Charges     Row Name 08/02/21 1126             Time Calculation    PT Received On  08/02/21  -CW        User Key  (r) = Recorded By, (t) = Taken By, (c) = Cosigned By    Initials Name Provider Type    Quinten Ansari PT Physical Therapist        Therapy Charges for Today     Code Description Service Date Service Provider Modifiers Qty    [id number]  PT EVAL MOD COMPLEXITY 4 8/2/2021 Quinten Taveras, PT GP 1               Quinten Taveras PT  8/2/2021

## 2021-08-02 NOTE — PROGRESS NOTES
Patient's discharge held, had episode of SVT heart rate of 150 bpm was transient nonsustained, patient was asymptomatic.  Nonetheless we will start her Toprol now, restart her Bumex and hold discharge until tomorrow if she remains stable. Will review telemetry.

## 2021-08-02 NOTE — DISCHARGE PLACEMENT REQUEST
"Ale Gupta (75 y.o. Female)     Date of Birth Social Security Number Address Home Phone MRN    1945  [address]  [place] [id number] [phone] [id number]    [employer] Marital Status          [employer] Single       Admission Date Admission Type Admitting Provider Attending Provider Department, Room/Bed    21 Emergency Félix Moss MD Oculam, Claire Chin, MD [address], 3349/2S    Discharge Date Discharge Disposition Discharge Destination         Home-Health Care [facility]              Attending Provider: Pastora Warren MD    Allergies: Codeine    Isolation: None   Infection: None   Code Status: CPR    Ht: 121.9 cm (47.99\")   Wt: 30.7 kg (67 lb 9.6 oz)    Admission Cmt: None   Principal Problem: None                Active Insurance as of 2021     Primary Coverage     Payor Plan Insurance Group Employer/Plan Group    MEDICARE MEDICARE A & B      Payor Plan Address Payor Plan Phone Number Payor Plan Fax Number Effective Dates    [address] [phone]  1974 - None Entered    [place] [address]       Subscriber Name Subscriber Birth Date Member ID       ALE GUPTA 1945 [id number]                 Emergency Contacts      (Rel.) Home Phone Work Phone Mobile Phone    Lea Andrews (Sister) [phone] -- --    EL ANDREWS (Sister) [phone] -- --               History & Physical      Félix Moss MD at 21 [address] [place] [address] HOSPITALIST HISTORY AND PHYSICAL    Patient Identification:  Name:  Ale Gupta  Age:  75 y.o.  Sex:  female  :  1945  MRN:  [id number]   Visit Number:  [id number]  Admit Date: 2021   Room number:  401/01  Primary Care Physician:  Neema Durbin APRN     Subjective     Chief complaint:    Chief Complaint   Patient presents with   • Dehydration       History of presenting illness:  75 y.o. female with hx of CKD, HTN, GERD, diastolic heart failure, COPD " who presented to the ED at the recommendation of PCP for worsening creatinine. Patient was admitted to [facility] recently for similar presentation. Patient lives with sister, who was bedside helping provide history as patient is a poor historian. Patient was directed to go to ED by PCP after recent labs revealed renal failure. Patient takes bumex BID for history of heart failure. She had minimal complaints, patient's sister reports she used to walk long distances every day but her mobility has recently worsened. She notes decent PO intake but PCP was concerned for recurrent dehydration. Patient reports increased urination the last few days.   ---------------------------------------------------------------------------------------------------------------------   Review of Systems   Constitutional: Negative.    HENT: Negative.    Eyes: Negative.    Respiratory: Negative.    Cardiovascular: Negative.    Gastrointestinal: Negative.    Endocrine: Negative.    Genitourinary: Negative.    Musculoskeletal: Negative.    Skin: Negative.    Allergic/Immunologic: Negative.    Neurological: Negative.    Hematological: Negative.    Psychiatric/Behavioral: Negative.      ---------------------------------------------------------------------------------------------------------------------   Past Medical History:   Diagnosis Date   • Allergy    • Arthritis    • Asthma    • Congestive heart failure (CHF) (CMS/[facility])    • COPD (chronic obstructive pulmonary disease) (CMS/[facility])    • GERD (gastroesophageal reflux disease)    • Hypertension    • Stroke (CMS/[facility])     TIA     Past Surgical History:   Procedure Laterality Date   • CATARACT EXTRACTION     • COLONOSCOPY     • ENDOSCOPY     • FRACTURE SURGERY      LEFT ARM    • HERNIA REPAIR     • ORIF ULNA/RADIUS FRACTURES Left 7/30/2019    Procedure: ULNA/RADIUS OPEN REDUCTION INTERNAL FIXATION, radius only;  Surgeon: Maverick Stevenson MD;  Location: [facility];  Service: Orthopedics      Family History   Problem Relation Age of Onset   • Heart disease Father    • Heart disease Sister    • Hypertension Sister    • Cancer Brother    • Heart disease Maternal Grandmother    • No Known Problems Maternal Grandfather      Social History     Socioeconomic History   • Marital status: Single     Spouse name: Not on file   • Number of children: 0   • Years of education: 1   • Highest education level: Not on file   Tobacco Use   • Smoking status: Former Smoker     Packs/day: 0.25     Years: 22.00     Pack years: 5.50     Types: Cigarettes     Quit date: 5/10/2010     Years since quittin.2   • Smokeless tobacco: Never Used   Vaping Use   • Vaping Use: Never used   Substance and Sexual Activity   • Alcohol use: No   • Drug use: No   • Sexual activity: Defer     ---------------------------------------------------------------------------------------------------------------------   Allergies:  Codeine  ---------------------------------------------------------------------------------------------------------------------   Medications below are reported home medications pulling from within the system; at this time, these medications have not been reconciled unless otherwise specified and are in the verification process for further verifcation as current home medications.    Prior to Admission Medications     Prescriptions Last Dose Informant Patient Reported? Taking?    albuterol (PROVENTIL) (2.5 MG/3ML) 0.083% nebulizer solution   Yes No      180 mL, USE 1 VIAL IN NEBULIZER EVERY 4 HOURS AS NEEDED FOR SHORTNESS OF AIR, 0 Refill(s)    budesonide-formoterol (SYMBICORT) 160-4.5 MCG/ACT inhaler   No No    Inhale 2 puffs Daily.    bumetanide (BUMEX) 1 MG tablet   Yes No    1 mg.    fluticasone (FLONASE) 50 MCG/ACT nasal spray   Yes No      16 Gram, USE 2 SPRAYS IN EACH NOSTRIL ONCE DAILY, 0 Refill(s)    metoprolol succinate XL (TOPROL-XL) 100 MG 24 hr tablet   Yes No    Take 100 mg by mouth 2 (Two) Times a Day.     omeprazole (priLOSEC) 20 MG capsule   Yes No      30 Each, TAKE 1 CAPSULE BY MOUTH DAILY., 0 Refill(s)    potassium chloride (K-DUR,KLOR-CON) 10 MEQ CR tablet   Yes No    Take 10 mEq by mouth Daily.    predniSONE (DELTASONE) 20 MG tablet   Yes No    TAKE 2 TABLETS BY MOUTH DAILY FOR 3 DAYS    Respiratory Therapy Supplies (NEBULIZER/TUBING/MOUTHPIECE) kit   No No    1 each Take As Directed.        Objective     Vital Signs:  Temp:  [97.7 °F (36.5 °C)] 97.7 °F (36.5 °C)  Heart Rate:  [87] 87  Resp:  [17] 17  BP: (106)/(43) 106/43    No data found.  SpO2:  [95 %] 95 %  on   ;   Device (Oxygen Therapy): room air  Body mass index is 22.89 kg/m².    Wt Readings from Last 3 Encounters:   07/30/21 34 kg (75 lb)   05/10/21 35.3 kg (77 lb 14.4 oz)   02/03/21 34.9 kg (77 lb)      ---------------------------------------------------------------------------------------------------------------------   Physical Exam:  Constitutional:  Well-developed and well-nourished.  No respiratory distress. Short stature.     HENT:  Head: Normocephalic and atraumatic.  Mouth:  Moist mucous membranes.    Eyes:  Conjunctivae and EOM are normal.  Pupils are equal, round, and reactive to light.  No scleral icterus.  Cardiovascular:  Normal rate, regular rhythm and normal heart sounds with no murmur.  Pulmonary/Chest:  No respiratory distress, no wheezes, no crackles, with normal breath sounds and good air movement.  Abdominal:  Soft.  Bowel sounds are normal.  No distension and no tenderness.   Musculoskeletal:  No edema, no tenderness, and no deformity.  No red or swollen joints anywhere.    Neurological:  Alert and oriented to person, place, and time.  Limited insight.   Skin:  Skin is warm and dry.  No rash noted.  No pallor.   Peripheral vascular:  No edema and strong pulses on all 4 extremities.    ---------------------------------------------------------------------------------------------------------------------  EKG:    No orders to display        Telemetry:      I have personally looked at both the EKG and the telemetry strips.    Last echocardiogram:  Results for orders placed during the hospital encounter of 07/29/19    Adult Transthoracic Echo Complete W/ Cont if Necessary Per Protocol    Interpretation Summary  · Left ventricular systolic function is normal.  · Estimated EF appears to be in the range of 66 - 70%  · All left ventricular wall segments contract normally.  · Left ventricular diastolic dysfunction (grade I) consistent with impaired relaxation.  · Left atrial cavity size is mildly dilated.  · Mild calcification of the aortic leaflets. No evidence of aortic stenosis or regurgitation.  · Moderate mitral valve regurgitation is present  · Mild to moderate tricuspid valve regurgitation is present.  · Moderate pulmonary hypertension is present. Estimated RV systolic pressure is 45 to 55 mmHg.  · There is a trivial pericardial effusion.    --------------------------------------------------------------------------------------------------------------------  Labs:  Results from last 7 days   Lab Units 07/30/21  1545 07/30/21  1523 07/29/21  1038   WBC 10*3/mm3  --  8.54 7.11   HEMOGLOBIN g/dL  --  7.5* 8.2*   HEMATOCRIT %  --  23.7* 25.6*   MCV fL  --  90.8 91.8   MCHC g/dL  --  31.6 32.0   PLATELETS 10*3/mm3  --  419 325   INR  1.13*  --   --          Results from last 7 days   Lab Units 07/30/21  1523 07/29/21  1038   SODIUM mmol/L 134* 137   POTASSIUM mmol/L 3.5 3.5   CHLORIDE mmol/L 88* 88*   CO2 mmol/L 32.8* 33.3*   BUN mg/dL 89* 80*   CREATININE mg/dL 3.07* 2.34*   EGFR IF NONAFRICN AM mL/min/1.73 15* 20*   CALCIUM mg/dL 9.2 9.0   GLUCOSE mg/dL 129* 110*   ALBUMIN g/dL 4.18 4.40   BILIRUBIN mg/dL 1.1 1.1   ALK PHOS U/L 111 99   AST (SGOT) U/L 15 16   ALT (SGPT) U/L 14 19   Estimated Creatinine Clearance: 8.8 mL/min (A) (by C-G formula based on SCr of 3.07 mg/dL (H)).    No results found for: AMMONIA  Results from last 7 days   Lab Units  07/30/21  1523 07/29/21  1038   CK TOTAL U/L 16*  --    PROBNP pg/mL  --  4,328.0*         No results found for: HGBA1C, POCGLU  Lab Results   Component Value Date    TSH 1.770 05/28/2019     No results found for: PREGTESTUR, PREGSERUM, HCG, HCGQUANT  Pain Management Panel    There is no flowsheet data to display.       Brief Urine Lab Results  (Last result in the past 365 days)      Color   Clarity   Blood   Leuk Est   Nitrite   Protein   CREAT   Urine HCG        07/30/21 1546 Yellow Clear Negative Negative Negative 30 mg/dL (1+)             No results found for: BLOODCX  No results found for: URINECX  No results found for: WOUNDCX  No results found for: STOOLCX    I have personally looked at the labs and they are summarized above.  ----------------------------------------------------------------------------------------------------------------------  Detailed radiology reports for the last 24 hours:    Imaging Results (Last 24 Hours)     Procedure Component Value Units Date/Time    XR Chest 1 View [[id number]] Collected: 07/30/21 1818     Updated: 07/30/21 1821    Narrative:      XR CHEST 1 VW-     CLINICAL INDICATION: admit orders        COMPARISON: None available      TECHNIQUE: Single frontal view of the chest.     FINDINGS:     There is no focal alveolar infiltrate or effusion.  The cardiac silhouette is normal. The pulmonary vasculature is  unremarkable.  Left-sided rib fractures. They appear remote  There are no suspicious-appearing parenchymal soft tissue nodules.          Impression:      No evidence of active or acute cardiopulmonary disease on today's chest  radiograph.     This report was finalized on 7/30/2021 6:19 PM by Dr. Vidal Arnold MD.       CT Abdomen Pelvis Stone Protocol [[id number]] Collected: 07/30/21 1712     Updated: 07/30/21 1716    Narrative:      EXAM: CT ABDOMEN PELVIS STONE PROTOCOL-         TECHNIQUE: Multiple axial CT images were obtained from lung bases  through pubic symphysis  WITHOUT administration of IV contrast.  Reformatted images in the coronal and/or sagittal plane(s) were  generated from the axial data set to facilitate diagnostic accuracy  and/or surgical planning.  Oral Contrast:NONE.     Radiation dose reduction techniques were utilized per ALARA protocol.  Automated exposure control was initiated through either or [employer] or  [employer] software packages by  protocol.    DOSE: 322.68 mGy.cm     Clinical information KENNEDY      Comparison none immediately available at this time     FINDINGS:     Lower thorax: Clear. No effusions.     Abdomen:     Liver: Homogeneous. No focal hepatic mass or ductal dilatation.     Gallbladder: No dilation or stone identified.     Pancreas: Unremarkable. No mass or ductal dilatation.     Spleen: Homogeneous. No splenomegaly.     Adrenals: Left adrenal nodule.     Kidneys/ureters: End-stage appearing left kidney.  No obstructive uropathy on the right.     GI tract: Moderate to large stool burden.     MESENTERY: No free fluid, walled off fluid collections, mesenteric  stranding, or enlarged lymph nodes         Vasculature: Evidence of atherosclerotic vascular disease     Abdominal wall: No focal hernia or mass.        Bladder: Thickening of the urinary bladder wall     Reproductive: Unremarkable as visualized     Bones: No acute bony abnormality.       Impression:         1. Left adrenal nodule versus cyst off of an end-stage appearing left  kidney.     2.Mild thickening of the urinary bladder wall     3. Other findings as above              This report was finalized on 7/30/2021 5:14 PM by Dr. Vidal Arnold MD.           Final impressions for the last 30 days of radiology reports:    XR Chest 1 View    Result Date: 7/30/2021  No evidence of active or acute cardiopulmonary disease on today's chest radiograph.  This report was finalized on 7/30/2021 6:19 PM by Dr. Vidal Arnold MD.      CT Abdomen Pelvis Stone Protocol    Result Date:  7/30/2021   1. Left adrenal nodule versus cyst off of an end-stage appearing left kidney.  2.Mild thickening of the urinary bladder wall  3. Other findings as above     This report was finalized on 7/30/2021 5:14 PM by Dr. Vidal Arnold MD.      I have personally looked at the radiology images and read the final radiology report.    Assessment & Plan       #KENNEDY on CKD  #Proteinuria   - Baseline Cr unclear but was recently 1.49   - Will request medical records to see what renal function was recent admission at [facility]  - Patient appears dry. S/P 500 cc bolus in Ed. Will give 75 cc/hr for 10 hours and repeat renal function in AM.   - No obstructive process evident on CT. Suspect prerenal. Will get urine protein/creatinine and Rosa.     #Left adrenal cyst vs. Nodule   - Outpatient monitoring     #AOCD/AOCKD  - No signs of bleeding. Hemoglobin has slowly trended down the last couple of weeks from 9.5 to 7.5. Repeat in AM.     #HTN  - Restart home regimen     #GERD  - Restart home regimen     #COPD  - Restart home regimen     #Hx of diastolic heart failure   #Moderate pulmonary HTN  - Last echo here 7/31/2019 revealed normal LVEF, grade 1 a diastolic dysfunction   - Hold diuretics in setting of KENNEDY  - Will repeat echo. Cautiously giving fluids as above.     F: PO  E: Replace as needed   N: Regular     Code status: Full     Dispo: Admit to med/surg     VTE Prophylaxis:   Mechanical Order History:     None      Pharmalogical Order History:      Ordered     Dose Route Frequency Stop    Signed and Held  heparin (porcine) 5000 UNIT/ML injection 5,000 Units      5,000 Units SC Every 8 Hours Scheduled --                    Félix Moss MD  [facility]  07/30/21  19:20 EDT    Electronically signed by Félix Moss MD at 07/31/21 0826       Vital Signs (last day)     Date/Time   Temp   Temp src   Pulse   Resp   BP   Patient Position   SpO2    08/02/21 1154   --   --   --   --   --   --   91     08/02/21 1008   99.4 (37.4)   Oral   103   18   140/62   Lying   (!) 88    08/02/21 0600   98.3 (36.8)   Oral   102   18   115/49   Lying   --    08/02/21 0300   98.2 (36.8)   --   97   18   124/54   Lying   91    08/01/21 2239   98.9 (37.2)   Oral   103   18   145/59   Lying   91    08/01/21 1847   98.7 (37.1)   Oral   97   18   138/60   Lying   92    08/01/21 1845   --   --   98   18   --   --   92    08/01/21 1837   --   --   103   18   --   --   93    08/01/21 1500   99.2 (37.3)   Oral   88   18   122/57   Lying   --    08/01/21 1100   98.8 (37.1)   Oral   95   20   143/61   Lying   --    08/01/21 0746   --   --   107   20   --   --   91    08/01/21 0740   98.8 (37.1)   Oral   85   20   117/56   Lying   95    08/01/21 0300   98.6 (37)   Oral   119   20   122/49   Lying   --              Intake & Output (last day)       08/01 0701 - 08/02 0700 08/02 0701 - 08/03 0700    P.O. 360 60    Blood      Total Intake(mL/kg) 360 (11.7) 60 (2)    Net +360 +60          Urine Unmeasured Occurrence 5 x 1 x          Current Facility-Administered Medications   Medication Dose Route Frequency Provider Last Rate Last Admin   • albuterol (PROVENTIL) nebulizer solution 0.083% 2.5 mg/3mL  2.5 mg Nebulization Q4H PRN Félix Moss MD   2.5 mg at 08/02/21 0750   • budesonide-formoterol (SYMBICORT) 160-4.5 MCG/ACT inhaler 2 puff  2 puff Inhalation Daily Félix Moss MD   2 puff at 08/02/21 0757   • docusate sodium (COLACE) capsule 100 mg  100 mg Oral BID PRN Félix Moss MD       • fluticasone (FLONASE) 50 MCG/ACT nasal spray 2 spray  2 spray Nasal Daily Félix Moss MD   2 spray at 08/02/21 0835   • pantoprazole (PROTONIX) EC tablet 40 mg  40 mg Oral Q AM Félix Moss MD   40 mg at 08/02/21 0511   • sodium chloride 0.9 % flush 10 mL  10 mL Intravenous Q12H Félix Moss MD   10 mL at 08/02/21 0834   • sodium chloride 0.9 % flush 10 mL  10 mL Intravenous PRN Félix Moss MD         Lab Results (most recent)      Procedure Component Value Units Date/Time    Basic Metabolic Panel [[id number]]  (Abnormal) Collected: 08/02/21 0414    Specimen: Blood Updated: 08/02/21 0455     Glucose 111 mg/dL      BUN 43 mg/dL      Creatinine 1.44 mg/dL      Sodium 140 mmol/L      Potassium 4.6 mmol/L      Chloride 104 mmol/L      CO2 25.3 mmol/L      Calcium 8.7 mg/dL      eGFR Non African Amer 36 mL/min/1.73      BUN/Creatinine Ratio 29.9     Anion Gap 10.7 mmol/L     Narrative:      GFR Normal >60  Chronic Kidney Disease <60  Kidney Failure <15      Magnesium [[id number]]  (Normal) Collected: 08/02/21 0414    Specimen: Blood Updated: 08/02/21 0455     Magnesium 2.1 mg/dL     CBC & Differential [[id number]]  (Abnormal) Collected: 08/02/21 0414    Specimen: Blood Updated: 08/02/21 0429    Narrative:      The following orders were created for panel order CBC & Differential.  Procedure                               Abnormality         Status                     ---------                               -----------         ------                     CBC Auto Differential[[id number]]        Abnormal            Final result                 Please view results for these tests on the individual orders.    CBC Auto Differential [[id number]]  (Abnormal) Collected: 08/02/21 0414    Specimen: Blood Updated: 08/02/21 0429     WBC 4.01 10*3/mm3      RBC 2.61 10*6/mm3      Hemoglobin 7.8 g/dL      Hematocrit 24.8 %      MCV 95.0 fL      MCH 29.9 pg      MCHC 31.5 g/dL      RDW 20.5 %      RDW-SD 69.1 fl      MPV 11.8 fL      Platelets 316 10*3/mm3      Neutrophil % 78.8 %      Lymphocyte % 12.0 %      Monocyte % 8.0 %      Eosinophil % 0.0 %      Basophil % 0.2 %      Immature Grans % 1.0 %      Neutrophils, Absolute 3.16 10*3/mm3      Lymphocytes, Absolute 0.48 10*3/mm3      Monocytes, Absolute 0.32 10*3/mm3      Eosinophils, Absolute 0.00 10*3/mm3      Basophils, Absolute 0.01 10*3/mm3      Immature Grans, Absolute 0.04 10*3/mm3      nRBC 0.0 /100 WBC      Magnesium [[id number]]  (Abnormal) Collected: 08/01/21 0926    Specimen: Blood Updated: 08/01/21 1143     Magnesium 1.4 mg/dL     Basic Metabolic Panel [[id number]]  (Abnormal) Collected: 08/01/21 0926    Specimen: Blood Updated: 08/01/21 1040     Glucose 143 mg/dL      BUN 53 mg/dL      Creatinine 1.55 mg/dL      Sodium 140 mmol/L      Potassium 2.9 mmol/L      Chloride 99 mmol/L      CO2 28.2 mmol/L      Calcium 8.7 mg/dL      eGFR Non African Amer 33 mL/min/1.73      BUN/Creatinine Ratio 34.2     Anion Gap 12.8 mmol/L     Narrative:      GFR Normal >60  Chronic Kidney Disease <60  Kidney Failure <15      CBC & Differential [[id number]]  (Abnormal) Collected: 08/01/21 0926    Specimen: Blood Updated: 08/01/21 1021    Narrative:      The following orders were created for panel order CBC & Differential.  Procedure                               Abnormality         Status                     ---------                               -----------         ------                     CBC Auto Differential[[id number]]        Abnormal            Final result                 Please view results for these tests on the individual orders.    CBC Auto Differential [[id number]]  (Abnormal) Collected: 08/01/21 0926    Specimen: Blood Updated: 08/01/21 1021     WBC 4.97 10*3/mm3      RBC 2.80 10*6/mm3      Hemoglobin 8.3 g/dL      Hematocrit 26.0 %      MCV 92.9 fL      MCH 29.6 pg      MCHC 31.9 g/dL      RDW 20.0 %      RDW-SD 65.3 fl      MPV 12.2 fL      Platelets 342 10*3/mm3      Neutrophil % 78.8 %      Lymphocyte % 11.5 %      Monocyte % 8.7 %      Eosinophil % 0.0 %      Basophil % 0.4 %      Immature Grans % 0.6 %      Neutrophils, Absolute 3.92 10*3/mm3      Lymphocytes, Absolute 0.57 10*3/mm3      Monocytes, Absolute 0.43 10*3/mm3      Eosinophils, Absolute 0.00 10*3/mm3      Basophils, Absolute 0.02 10*3/mm3      Immature Grans, Absolute 0.03 10*3/mm3      nRBC 0.0 /100 WBC     POC Glucose Once [[id number]]  (Normal) Collected:  08/01/21 0705    Specimen: Blood Updated: 08/01/21 0711     Glucose 104 mg/dL      Comment: Meter: [id number] : [id number] ANNETTE PYLE       Hemoglobin & Hematocrit, Blood [[id number]]  (Abnormal) Collected: 07/31/21 1830    Specimen: Blood Updated: 07/31/21 1847     Hemoglobin 8.6 g/dL      Hematocrit 27.4 %     Narrative:      Post Transfusion Specimen      Hemoglobin [[id number]]  (Abnormal) Collected: 07/31/21 0849    Specimen: Blood Updated: 07/31/21 0915     Hemoglobin 6.7 g/dL     Scan Slide [[id number]] Collected: 07/31/21 0651    Specimen: Blood Updated: 07/31/21 0833     Anisocytosis Large/3+     Poikilocytes Slight/1+     Platelet Morphology Normal    Protein / Creatinine Ratio, Urine - Urine, Clean Catch [[id number]]  (Abnormal) Collected: 07/30/21 1546    Specimen: Urine, Clean Catch Updated: 07/31/21 0351     Protein/Creatinine Ratio, Urine 447.3 mg/G Crea      Creatinine, Urine 62.6 mg/dL      Total Protein, Urine 28.0 mg/dL     Sodium, Urine, Random - Urine, Clean Catch [[id number]] Collected: 07/30/21 1546    Specimen: Urine, Clean Catch Updated: 07/30/21 1939     Sodium, Urine 26 mmol/L     Narrative:      Reference intervals for random urine have not been established.  Clinical usage is dependent upon physician's interpretation in combination with other laboratory tests.       COVID-19 and FLU A/B PCR - Swab, Nasopharynx [[id number]]  (Normal) Collected: 07/30/21 1734    Specimen: Swab from Nasopharynx Updated: 07/30/21 1837     COVID19 Not Detected     Influenza A PCR Not Detected     Influenza B PCR Not Detected    Narrative:      Fact sheet for providers: https://www.fda.gov/media/212086/download    Fact sheet for patients: https://www.fda.gov/media/376526/download    Test performed by PCR.    BNP [[id number]]  (Abnormal) Collected: 07/29/21 1038    Specimen: Blood from Arm, Right Updated: 07/30/21 1824     proBNP 4,328.0 pg/mL     Narrative:      Among patients with dyspnea, NT-proBNP is  highly sensitive for the detection of acute congestive heart failure. In addition NT-proBNP of <300 pg/ml effectively rules out acute congestive heart failure with 99% negative predictive value.    Results may be falsely decreased if patient taking Biotin.      CK [[id number]]  (Abnormal) Collected: 07/30/21 1523    Specimen: Blood from Arm, Right Updated: 07/30/21 1659     Creatine Kinase 16 U/L     Lipase [[id number]]  (Abnormal) Collected: 07/30/21 1523    Specimen: Blood from Arm, Right Updated: 07/30/21 1659     Lipase 69 U/L     [place] Draw [[id number]] Collected: 07/30/21 1523    Specimen: Blood from Arm, Right Updated: 07/30/21 1630    Narrative:      The following orders were created for panel order [place] [facility].  Procedure                               Abnormality         Status                     ---------                               -----------         ------                     Green Top (Gel)[[id number]]                                  Final result               Lavender Top[[id number]]                                     Final result               Gold Top - SST[[id number]]                                   Final result                 Please view results for these tests on the individual orders.    Green Top (Gel) [[id number]] Collected: 07/30/21 1523    Specimen: Blood from Arm, Right Updated: 07/30/21 1630     Extra Tube Hold for add-ons.     Comment: Auto resulted.       Lavender Top [[id number]] Collected: 07/30/21 1523    Specimen: Blood from Arm, Right Updated: 07/30/21 1630     Extra Tube hold for add-on     Comment: Auto resulted       Gold Top - SST [[id number]] Collected: 07/30/21 1523    Specimen: Blood from Arm, Right Updated: 07/30/21 1630     Extra Tube Hold for add-ons.     Comment: Auto resulted.       Scan Slide [[id number]] Collected: 07/30/21 1523    Specimen: Blood from Arm, Right Updated: 07/30/21 1625     Anisocytosis Large/3+     Dacrocytes Slight/1+     Ovalocytes Slight/1+      Platelet Estimate Increased    Protime-INR [[id number]]  (Abnormal) Collected: 07/30/21 1545    Specimen: Blood from Arm, Right Updated: 07/30/21 1605     Protime 14.9 Seconds      INR 1.13    Narrative:      Suggested INR therapeutic range for stable oral anticoagulant therapy:    Low Intensity therapy:   1.5-2.0  Moderate Intensity therapy:   2.0-3.0  High Intensity therapy:   2.5-4.0    aPTT [[id number]]  (Normal) Collected: 07/30/21 1545    Specimen: Blood from Arm, Right Updated: 07/30/21 1605     PTT 30.2 seconds     Narrative:      PTT Heparin Therapeutic Range:  59 - 95 seconds      Urinalysis, Microscopic Only - Urine, Clean Catch [[id number]]  (Abnormal) Collected: 07/30/21 1546    Specimen: Urine, Clean Catch Updated: 07/30/21 1601     RBC, UA 0-2 /HPF      WBC, UA 0-2 /HPF      Bacteria, UA None Seen /HPF      Squamous Epithelial Cells, UA 3-6 /HPF      Hyaline Casts, UA None Seen /LPF      Methodology Automated Microscopy    Urinalysis With Microscopic If Indicated (No Culture) - Urine, Clean Catch [[id number]]  (Abnormal) Collected: 07/30/21 1546    Specimen: Urine, Clean Catch Updated: 07/30/21 1601     Color, UA Yellow     Appearance, UA Clear     pH, UA 6.0     Specific Gravity, UA 1.013     Glucose, UA Negative     Ketones, UA Negative     Bilirubin, UA Negative     Blood, UA Negative     Protein, UA 30 mg/dL (1+)     Leuk Esterase, UA Negative     Nitrite, UA Negative     Urobilinogen, UA 1.0 E.U./dL    Comprehensive Metabolic Panel [[id number]]  (Abnormal) Collected: 07/30/21 1523    Specimen: Blood from Arm, Right Updated: 07/30/21 1552     Glucose 129 mg/dL      BUN 89 mg/dL      Creatinine 3.07 mg/dL      Sodium 134 mmol/L      Potassium 3.5 mmol/L      Chloride 88 mmol/L      CO2 32.8 mmol/L      Calcium 9.2 mg/dL      Total Protein 6.4 g/dL      Albumin 4.18 g/dL      ALT (SGPT) 14 U/L      AST (SGOT) 15 U/L      Alkaline Phosphatase 111 U/L      Total Bilirubin 1.1 mg/dL      eGFR Non   Amer 15 mL/min/1.73      Globulin 2.2 gm/dL      A/G Ratio 1.9 g/dL      BUN/Creatinine Ratio 29.0     Anion Gap 13.2 mmol/L     Narrative:      GFR Normal >60  Chronic Kidney Disease <60  Kidney Failure <15            Imaging Results (Most Recent)     Procedure Component Value Units Date/Time    XR Chest 1 View [[id number]] Collected: 07/30/21 1818     Updated: 07/30/21 1821    Narrative:      XR CHEST 1 VW-     CLINICAL INDICATION: admit orders        COMPARISON: None available      TECHNIQUE: Single frontal view of the chest.     FINDINGS:     There is no focal alveolar infiltrate or effusion.  The cardiac silhouette is normal. The pulmonary vasculature is  unremarkable.  Left-sided rib fractures. They appear remote  There are no suspicious-appearing parenchymal soft tissue nodules.          Impression:      No evidence of active or acute cardiopulmonary disease on today's chest  radiograph.     This report was finalized on 7/30/2021 6:19 PM by Dr. Vidal Arnold MD.       CT Abdomen Pelvis Stone Protocol [[id number]] Collected: 07/30/21 1712     Updated: 07/30/21 1716    Narrative:      EXAM: CT ABDOMEN PELVIS STONE PROTOCOL-         TECHNIQUE: Multiple axial CT images were obtained from lung bases  through pubic symphysis WITHOUT administration of IV contrast.  Reformatted images in the coronal and/or sagittal plane(s) were  generated from the axial data set to facilitate diagnostic accuracy  and/or surgical planning.  Oral Contrast:NONE.     Radiation dose reduction techniques were utilized per ALARA protocol.  Automated exposure control was initiated through either or CareDose or  DoseRight software packages by  protocol.    DOSE: 322.68 mGy.cm     Clinical information KENNEDY      Comparison none immediately available at this time     FINDINGS:     Lower thorax: Clear. No effusions.     Abdomen:     Liver: Homogeneous. No focal hepatic mass or ductal dilatation.     Gallbladder: No dilation or stone  identified.     Pancreas: Unremarkable. No mass or ductal dilatation.     Spleen: Homogeneous. No splenomegaly.     Adrenals: Left adrenal nodule.     Kidneys/ureters: End-stage appearing left kidney.  No obstructive uropathy on the right.     GI tract: Moderate to large stool burden.     MESENTERY: No free fluid, walled off fluid collections, mesenteric  stranding, or enlarged lymph nodes         Vasculature: Evidence of atherosclerotic vascular disease     Abdominal wall: No focal hernia or mass.        Bladder: Thickening of the urinary bladder wall     Reproductive: Unremarkable as visualized     Bones: No acute bony abnormality.       Impression:         1. Left adrenal nodule versus cyst off of an end-stage appearing left  kidney.     2.Mild thickening of the urinary bladder wall     3. Other findings as above              This report was finalized on 2021 5:14 PM by Dr. Vidal Arnold MD.           Operative/Procedure Notes (most recent note)    No notes of this type exist for this encounter.            Physician Progress Notes (most recent note)      Félix Moss MD at 21 1410              [facility]     Patient Identification:  Name:  Ale Gupta  Age:  75 y.o.  Sex:  female  :  1945  MRN:  [id number]  Visit Number:  [id number]  ROOM: [address] [place] [address]     Primary Care Provider:  Neema Dubrin APRN    Length of stay in inpatient status:  2    Subjective     Chief Compliant:    Chief Complaint   Patient presents with   • Dehydration       History of Presenting Illness:    Patient reports feeling well today. Still has not been out of bed. Reports good urine output. Denies any signs of bleeding. She reports she has had BMs that are non-bloody.     ROS:  Otherwise 10 point ROS negative other than documented above in HPI.     Objective     Current Hospital Meds:budesonide-formoterol, 2 puff, Inhalation, Daily  fluticasone, 2 spray, Nasal,  Daily  pantoprazole, 40 mg, Oral, Q AM  potassium chloride, 30 mEq, Oral, Q4H  sodium chloride, 10 mL, Intravenous, Q12H         Current Antimicrobial Therapy:  Anti-Infectives (From admission, onward)    None        Current Diuretic Therapy:  Diuretics (From admission, onward)    None        ----------------------------------------------------------------------------------------------------------------------  Vital Signs:  Temp:  [98.3 °F (36.8 °C)-98.9 °F (37.2 °C)] 98.8 °F (37.1 °C)  Heart Rate:  [] 95  Resp:  [18-20] 20  BP: (113-144)/(47-67) 143/61  SpO2:  [91 %-98 %] 91 %  on   ;   Device (Oxygen Therapy): room air  Body mass index is 20.64 kg/m².    Wt Readings from Last 3 Encounters:   07/30/21 30.7 kg (67 lb 9.6 oz)   05/10/21 35.3 kg (77 lb 14.4 oz)   02/03/21 34.9 kg (77 lb)     Intake & Output (last 3 days)       07/29 0701 - 07/30 0700 07/30 0701 - 07/31 0700 07/31 0701 - 08/01 0700 08/01 0701 - 08/02 0700    P.O.  580 860 240    Blood   300     IV Piggyback  500      Total Intake(mL/kg)  1080 (35.2) 1160 (37.8) 240 (7.8)    Urine (mL/kg/hr)  1000      Total Output  1000      Net  +80 +1160 +240            Urine Unmeasured Occurrence   6 x 2 x    Stool Unmeasured Occurrence   1 x         Diet Regular  ----------------------------------------------------------------------------------------------------------------------  Physical exam:  Constitutional:  Well-developed and well-nourished.  No respiratory distress. Short stature.      HENT:  Head:  Normocephalic and atraumatic.  Mouth:  Moist mucous membranes.    Eyes:  Conjunctivae and EOM are normal. No scleral icterus.    Neck:  Neck supple.  No JVD present.    Cardiovascular:  Normal rate, regular rhythm and normal heart sounds with no murmur.  Pulmonary/Chest:  No respiratory distress, no wheezes, no crackles, with normal breath sounds and good air movement.  Abdominal:  Soft.  Bowel sounds are normal.  No distension and no tenderness.    Musculoskeletal:  No edema, no tenderness, and no deformity.  No red or swollen joints anywhere.    Neurological:  Alert and oriented to person, place, and time.  No cranial nerve deficit.  No tongue deviation.  No facial droop.  No slurred speech.   Skin:  Skin is warm and dry. No rash noted. No pallor.   Peripheral vascular:  Pulses in all 4 extremities with no clubbing, no cyanosis, no edema.  ----------------------------------------------------------------------------------------------------------------------  Tele:    ----------------------------------------------------------------------------------------------------------------------  Results from last 7 days   Lab Units 08/01/21  0926 07/31/21  1830 07/31/21  0849 07/31/21  0651 07/31/21  0651 07/30/21  1545 07/30/21  1523 07/30/21  1523   WBC 10*3/mm3 4.97  --   --   --  4.36  --   --  8.54   HEMOGLOBIN g/dL 8.3* 8.6* 6.7*   < > 6.4*  --    < > 7.5*   HEMATOCRIT % 26.0* 27.4*  --   --  20.1*  --    < > 23.7*   MCV fL 92.9  --   --   --  93.1  --   --  90.8   MCHC g/dL 31.9  --   --   --  31.8  --   --  31.6   PLATELETS 10*3/mm3 342  --   --   --  277  --   --  419   INR   --   --   --   --   --  1.13*  --   --     < > = values in this interval not displayed.         Results from last 7 days   Lab Units 08/01/21  0926 07/31/21  0849 07/31/21  0651 07/30/21  1523 07/29/21  1038 07/29/21  1038   SODIUM mmol/L 140  --  136 134*   < > 137   POTASSIUM mmol/L 2.9*  --  3.0* 3.5   < > 3.5   MAGNESIUM mg/dL 1.4* 1.5*  --   --   --   --    CHLORIDE mmol/L 99  --  95* 88*   < > 88*   CO2 mmol/L 28.2  --  29.3* 32.8*   < > 33.3*   BUN mg/dL 53*  --  80* 89*   < > 80*   CREATININE mg/dL 1.55*  --  2.49* 3.07*   < > 2.34*   EGFR IF NONAFRICN AM mL/min/1.73 33*  --  19* 15*   < > 20*   CALCIUM mg/dL 8.7  --  8.3* 9.2   < > 9.0   GLUCOSE mg/dL 143*  --  133* 129*   < > 110*   ALBUMIN g/dL  --   --   --  4.18  --  4.40   BILIRUBIN mg/dL  --   --   --  1.1  --  1.1   ALK  PHOS U/L  --   --   --  111  --  99   AST (SGOT) U/L  --   --   --  15  --  16   ALT (SGPT) U/L  --   --   --  14  --  19    < > = values in this interval not displayed.   Estimated Creatinine Clearance: 15.7 mL/min (A) (by C-G formula based on SCr of 1.55 mg/dL (H)).  No results found for: AMMONIA  Results from last 7 days   Lab Units 07/30/21  1523   CK TOTAL U/L 16*     Results from last 7 days   Lab Units 07/29/21  1038   PROBNP pg/mL 4,328.0*         Glucose   Date/Time Value Ref Range Status   08/01/2021 0705 104 70 - 130 mg/dL Final     Comment:     Meter: [id number] : [id number] ANNETTE PYLE     Lab Results   Component Value Date    TSH 1.770 05/28/2019     No results found for: PREGTESTUR, PREGSERUM, HCG, HCGQUANT  Pain Management Panel     Pain Management Panel Latest Ref Rng & Units 7/30/2021    CREATININE UR mg/dL 62.6        Brief Urine Lab Results  (Last result in the past 365 days)      Color   Clarity   Blood   Leuk Est   Nitrite   Protein   CREAT   Urine HCG        07/30/21 1546             62.6       07/30/21 1546 Yellow Clear Negative Negative Negative 30 mg/dL (1+)             No results found for: BLOODCX  No results found for: URINECX  No results found for: WOUNDCX  No results found for: STOOLCX  No results found for: RESPCX  No results found for: AFBCX        I have personally looked at the labs and they are summarized above.  ----------------------------------------------------------------------------------------------------------------------  Detailed radiology reports for the last 24 hours:    Imaging Results (Last 24 Hours)     ** No results found for the last 24 hours. **        Assessment & Plan    #KENNEDY on CKD  #Proteinuria   - Baseline Cr unclear but was recently in 1.5-2.4 around recent hospitalization.   - Patient recently admitted for what was called cardiorenal syndrome requiring BiPAP and diuresis. Was taking both lasix and bumex at home.   - Cr on presentation 3.07.   -  Patient appeared dry on presentaiton and was given 500 cc bolus and 75 cc/hr for 10 hours.   - Rosa 26 supportive of prerenal etiology.   - No obstructive process evident on CT.   - Cr improved to 1.59 today. Likely at baseline and appears euvolemic. Will hold any further IVF at t his time.     #Left adrenal cyst vs. Nodule   - Outpatient monitoring      #AOCD/AOCKD  - Not iron deficient on recent labs  - Hgb baseline 9-10  - Hemolgobin trended down from 7.5 to 6.4 on admission. Suspect dilutional.   - No signs of bleeding.   - Will give 1 unit of pRBC and continue to monitor   - GI was planning outpatient endoscopy. Will continue plan or consult surgery if there is evidence of active bleeding.   - Hemoglobin stable at 8.3 today. Will repeat in AM.   - Change DVT prophylaxis to SCDs     #HTN  - Restart home regimen      #GERD  - Restart home regimen      #COPD  - Restart home regimen      #Hx of diastolic heart failure   #Moderate pulmonary HTN  - Last echo here 7/31/2019 revealed normal LVEF, grade 1 a diastolic dysfunction   - Hold diuretics in setting of KENNEDY  - Cautiously giving fluids as above.   - Repeat echo revealed normal LVEF. Grade 1 diastolic dysfunciton.     #Hypokalemia  #Hypomagnesemia  - Replaced      F: PO  E: Replace as needed   N: Regular      Code status: Full      Dispo: Pending clinical improvement. Lives with her sister. PT/OT pending.       VTE Prophylaxis:   Mechanical Order History:      Ordered        07/31/21 0827  Place Sequential Compression Device  Once         07/31/21 0827  Maintain Sequential Compression Device  Continuous                 Pharmalogical Order History:      Ordered     Dose Route Frequency Stop    07/30/21 1925  heparin (porcine) 5000 UNIT/ML injection 5,000 Units  Status:  Discontinued      5,000 Units SC Every 8 Hours Scheduled 07/31/21 0827                  Félix Moss MD  [facility]  08/01/21  14:10 EDT    Electronically signed by Ajay  Félix GUARDADO MD at 21 1413       Consult Notes (most recent note)    No notes of this type exist for this encounter.         Nutrition Notes (most recent note)    No notes exist for this encounter.            Physical Therapy Notes (most recent note)      Quinten Taveras, PT at 21 1126  Version 1 of 1         Acute Care - Physical Therapy Initial Evaluation  [facility]     Patient Name: Ale Gupta  : 1945  MRN: [id number]  Today's Date: 2021   Onset of Illness/Injury or Date of Surgery: 21  Visit Dx:     ICD-10-CM ICD-9-CM   1. Acute renal failure, unspecified acute renal failure type (CMS/HCC)  N17.9 584.9     Patient Active Problem List   Diagnosis   • Closed bimalleolar fracture of right ankle   • Essential hypertension   • Chronic allergic rhinitis   • Gastroesophageal reflux disease without esophagitis   • Bimalleolar fracture, right, closed, with routine healing, subsequent encounter   • Closed fracture of left distal radius   • Essential tremor   • COPD mixed type (CMS/HCC)   • Acute kidney injury (CMS/HCC)   • Congestive heart failure (CMS/HCC)   • Acute renal failure (ARF) (CMS/HCC)     Past Medical History:   Diagnosis Date   • Allergy    • Arthritis    • Asthma    • Congestive heart failure (CHF) (CMS/HCC)    • COPD (chronic obstructive pulmonary disease) (CMS/HCC)    • GERD (gastroesophageal reflux disease)    • Hypertension    • Stroke (CMS/HCC)     TIA     Past Surgical History:   Procedure Laterality Date   • CATARACT EXTRACTION     • COLONOSCOPY     • ENDOSCOPY     • FRACTURE SURGERY      LEFT ARM    • HERNIA REPAIR     • ORIF ULNA/RADIUS FRACTURES Left 2019    Procedure: ULNA/RADIUS OPEN REDUCTION INTERNAL FIXATION, radius only;  Surgeon: Maverick Stevenson MD;  Location: [facility];  Service: Orthopedics        PT Assessment (last 12 hours)      PT Evaluation and Treatment     Row Name 21 1102          Physical Therapy Time and Intention    Subjective Information   no complaints  -CW     Document Type  discharge evaluation/summary  -CW     Mode of Treatment  physical therapy  -CW     Patient Effort  good  -CW     Symptoms Noted During/After Treatment  none  -CW     Comment  Patient agreeable to physical therapy evaluation this date. She states she is feeling okay and has minimal subjective input.  -CW     Row Name 08/02/21 1102          General Information    Patient Profile Reviewed  yes  -CW     Onset of Illness/Injury or Date of Surgery  07/30/21  -CW     Referring Physician  Ajay  -CW     Patient Observations  cooperative;agree to therapy;lethargic  -CW     Prior Level of Function  min assist:  -CW     Equipment Currently Used at Home  -- Difficult to ascertain  -CW     Limitations/Impairments  safety/cognitive  -CW     Barriers to Rehab  previous functional deficit;cognitive status  -CW     Row Name 08/02/21 1102          Previous Level of Function/Home Environm    BADLs, Premorbid Functional Level  needs assistance for safe performance  -CW     Household Ambulation, Premorbid Functional Level  independent;needs assistance for safe performance  -CW     Row Name 08/02/21 1102          Living Environment    Current Living Arrangements  home/apartment/condo  -CW     Lives With  sibling(s)  -CW     Row Name 08/02/21 1102          Home Use of Assistive/Adaptive Equipment    Equipment Currently Used at Home  none  -CW     Row Name 08/02/21 1102          Cognition    Affect/Mental Status (Cognitive)  flat/blunted affect  -CW     Orientation Status (Cognition)  oriented to;person;verbal cues/prompts needed for orientation;place;situation  -CW     Follows Commands (Cognition)  follows one-step commands;physical/tactile prompts required;verbal cues/prompting required  -CW     Row Name 08/02/21 1102          Pain Scale: Word Pre/Post-Treatment    Pre/Posttreatment Pain Comment  Patient reports no pain during physical therapy evaluation this date.  -CW     Row Name 08/02/21 1102           Range of Motion Comprehensive    Comment, General Range of Motion  AROM grossly WFL  -CW     Row Name 08/02/21 1102          Strength Comprehensive (MMT)    Comment, General Manual Muscle Testing (MMT) Assessment  BLE MMT grossly 4/5  -CW     Row Name 08/02/21 1102          Bed Mobility    Bed Mobility  bed mobility (all) activities  -CW     All Activities, [place] (Bed Mobility)  contact guard assist  -CW     Assistive Device (Bed Mobility)  bed rails;head of bed elevated  -     Row Name 08/02/21 1102          Transfers    Transfers  sit-stand transfer;stand-sit transfer  -CW     Sit-Stand [place] (Transfers)  standby assist  -CW     Stand-Sit [place] (Transfers)  standby assist  -CW     Row Name 08/02/21 1102          Gait/Stairs (Locomotion)    [place] Level (Gait)  contact guard;nonverbal cues (demo/gesture)  -CW     Distance in Feet (Gait)  80  -CW     Pattern (Gait)  step-to;step-through  -CW     Deviations/Abnormal Patterns (Gait)  base of support, narrow;gait speed decreased;scissoring;stride length decreased  -     Bilateral Gait Deviations  heel strike decreased  -CW     Row Name 08/02/21 1102          Safety Issues, Functional Mobility    Safety Issues Affecting Function (Mobility)  awareness of need for assistance;impulsivity;insight into deficits/self-awareness;judgment;problem-solving  -     Impairments Affecting Function (Mobility)  balance;cognition;endurance/activity tolerance;strength  -     Row Name 08/02/21 1102          Balance    Balance Assessment  sitting static balance;standing static balance  -CW     Static Sitting Balance  mild impairment Related to patient's height/bed height  -     Static Standing Balance  mild impairment  -     Row Name 08/02/21 1102          Coping    Observed Emotional State  calm;cooperative  -     Trust Relationship/Rapport  care explained;choices provided;thoughts/feelings acknowledged  -     Row Name 08/02/21 1102           Positioning and Restraints    Pre-Treatment Position  in bed  -CW     Post Treatment Position  bed  -CW     In Bed  supine;call light within reach;encouraged to call for assist;exit alarm on  -CW     Row Name 08/02/21 1102          Therapy Assessment/Plan (PT)    Patient/Family Therapy Goals Statement (PT)  Patient reports no goals.  -CW     Functional Level at Time of Evaluation (PT)  Patient demonstrates mild functional mobility deficits likely due to decreased activity level associated with ongoing medical issues. She will benefit from ambulation and up to chair for meals with nursing, but is limited in her ability to meaningfully particiipate with skilled physical therapy.  -CW     Criteria for Skilled Interventions Met (PT)  does not meet criteria for skilled intervention  -CW     Row Name 08/02/21 1102          Therapy Plan Review/Discharge Plan (PT)    Therapy Plan Review (PT)  evaluation/treatment results reviewed  -CW       User Key  (r) = Recorded By, (t) = Taken By, (c) = Cosigned By    Initials Name Provider Type    Quinten Ansari PT Physical Therapist          PT Recommendation and Plan  Anticipated Discharge Disposition (PT): home with assist          Time Calculation:   PT Charges     Row Name 08/02/21 1126             Time Calculation    PT Received On  08/02/21  -CW        User Key  (r) = Recorded By, (t) = Taken By, (c) = Cosigned By    Initials Name Provider Type    Quinten Ansari PT Physical Therapist        Therapy Charges for Today     Code Description Service Date Service Provider Modifiers Qty    [id number]  PT EVAL MOD COMPLEXITY 4 8/2/2021 Quinten Taveras, PT GP 1               Quinten Taveras PT  8/2/2021      Electronically signed by Quinten Taveras PT at 08/02/21 1127          Occupational Therapy Notes (most recent note)      Rachel Logno, OT at 08/02/21 1205          Acute Care - Occupational Therapy Initial Evaluation  [facility]     Patient Name: Ale CHARLIE  Alonso  : 1945  MRN: [id number]  Today's Date: 2021  Onset of Illness/Injury or Date of Surgery: 21  Date of Referral to OT: 21  Referring Physician: Dr. Moss    Admit Date: 2021       ICD-10-CM ICD-9-CM   1. Acute renal failure, unspecified acute renal failure type (CMS/HCC)  N17.9 584.9     Patient Active Problem List   Diagnosis   • Closed bimalleolar fracture of right ankle   • Essential hypertension   • Chronic allergic rhinitis   • Gastroesophageal reflux disease without esophagitis   • Bimalleolar fracture, right, closed, with routine healing, subsequent encounter   • Closed fracture of left distal radius   • Essential tremor   • COPD mixed type (CMS/HCC)   • Acute kidney injury (CMS/HCC)   • Congestive heart failure (CMS/HCC)   • Acute renal failure (ARF) (CMS/HCC)     Past Medical History:   Diagnosis Date   • Allergy    • Arthritis    • Asthma    • Congestive heart failure (CHF) (CMS/HCC)    • COPD (chronic obstructive pulmonary disease) (CMS/HCC)    • GERD (gastroesophageal reflux disease)    • Hypertension    • Stroke (CMS/HCC)     TIA     Past Surgical History:   Procedure Laterality Date   • CATARACT EXTRACTION     • COLONOSCOPY     • ENDOSCOPY     • FRACTURE SURGERY      LEFT ARM    • HERNIA REPAIR     • ORIF ULNA/RADIUS FRACTURES Left 2019    Procedure: ULNA/RADIUS OPEN REDUCTION INTERNAL FIXATION, radius only;  Surgeon: Maverick Stevenson MD;  Location: [facility];  Service: Orthopedics            OT ASSESSMENT FLOWSHEET (last 12 hours)      OT Evaluation and Treatment     Row Name 21 1139                   OT Time and Intention    Subjective Information  complains of;weakness  -BF        Document Type  evaluation  -BF        Mode of Treatment  occupational therapy  -BF        Patient Effort  good  -BF           General Information    Patient Profile Reviewed  yes  -BF        Onset of Illness/Injury or Date of Surgery  21  -BF        Referring Physician   "Dr. Moss  -BF        Patient/Family/Caregiver Comments/Observations  Pt supine in bed, agreeable to OT  -BF        Prior Level of Function  min assist: Pt reports \"they won't let me\" perform ADLs independently  -BF        Equipment Currently Used at Home  -- Pt reports BSC, rolling walker, and w/c  -BF        Pertinent History of Current Functional Problem  Pt presented to hospital with dehydration  -BF        Existing Precautions/Restrictions  fall  -BF        Limitations/Impairments  safety/cognitive  -BF           Previous Level of Function/Home Environm    BADLs, Premorbid Functional Level  needs assistive device for safe performance  -BF           Living Environment    Current Living Arrangements  home/apartment/condo  -BF        Lives With  sibling(s)  -BF           Cognition    Affect/Mental Status (Cognitive)  confused  -BF        Orientation Status (Cognition)  oriented to;person  -BF        Follows Commands (Cognition)  follows one-step commands;verbal cues/prompting required;repetition of directions required  -BF           Range of Motion (ROM)    Range of Motion  bilateral upper extremities;ROM is WFL  -BF           Strength Comprehensive (MMT)    Comment, General Manual Muscle Testing (MMT) Assessment  BUE grossly 3+/5  -BF           Motor Skills    Motor Skills  coordination  -BF        Coordination  fine motor deficit;bilateral Tremors  -BF           Activities of Daily Living    BADL Assessment/Intervention  bathing;upper body dressing;lower body dressing;grooming;feeding;toileting  -BF           Bathing Assessment/Intervention    Comment (Bathing)  Min A  -BF           Upper Body Dressing Assessment/Training    Comment (Upper Body Dressing)  Set-up/supervision  -BF           Lower Body Dressing Assessment/Training    Comment (Lower Body Dressing)  Min A  -BF           Grooming Assessment/Training    Comment (Grooming)  Set-up/supervision  -BF           Self-Feeding Assessment/Training    Comment " (Feeding)  Set-up/Min A secondary to tremors  -BF           Toileting Assessment/Training    Comment (Toileting)  Min A  -BF           BADL Safety/Performance    Impairments, BADL Safety/Performance  cognition;endurance/activity tolerance;coordination;motor control;strength  -BF           OT Goals    Strength Goal Selection (OT)  strength, OT goal 1  -BF           Strength Goal 1 (OT)    Strength Goal 1 (OT)  Increase BUE MMT to 4-/5 to enhance independence with ADLs.   -BF        Time Frame (Strength Goal 1, OT)  by discharge  -BF           Positioning and Restraints    Post Treatment Position  bed  -BF        In Bed  supine;call light within reach;encouraged to call for assist  -BF           Therapy Assessment/Plan (OT)    Date of Referral to OT  08/01/21  -BF        Patient/Family Therapy Goal Statement (OT)  Dr. Warren  -BF        OT Diagnosis  Debility  -BF        Rehab Potential (OT)  good, to achieve stated therapy goals  -BF        Criteria for Skilled Therapeutic Interventions Met (OT)  yes  -BF        Predicted Duration of Therapy Intervention (OT)  1 week  -BF        Problem List (OT)  problems related to;coordination;motor control;mobility;strength  -BF        Activity Limitations Related to Problem List (OT)  unable to transfer safely;BADLs not performed adequately or safely  -BF        Planned Therapy Interventions (OT)  activity tolerance training;BADL retraining;ROM/therapeutic exercise;strengthening exercise;transfer/mobility retraining  -BF           Therapy Plan Review/Discharge Plan (OT)    Therapy Plan Review (OT)  patient  -BF        Anticipated Discharge Disposition (OT)  -- TBD  -BF          User Key  (r) = Recorded By, (t) = Taken By, (c) = Cosigned By    Initials Name Effective Dates    Rachel Taylor, OT 06/16/21 -                  OT Recommendation and Plan  Planned Therapy Interventions (OT): activity tolerance training, BADL retraining, ROM/therapeutic exercise, strengthening  exercise, transfer/mobility retraining           Time Calculation:     Therapy Charges for Today     Code Description Service Date Service Provider Modifiers Qty    [id number] HC OT EVAL MOD COMPLEXITY 4 2021 Rachel Longo OT GO 1               Rachel Longo OT  2021    Electronically signed by Rachel Longo OT at 21 1206       Speech Language Pathology Notes (most recent note)    No notes exist for this encounter.         Respiratory Therapy Notes (most recent note)    No notes exist for this encounter.         ADL Documentation (most recent)      Most Recent Value   Transferring  2 - assistive person   Toileting  3 - assistive equipment and person   Bathing  2 - assistive person   Dressing  2 - assistive person   Eating  2 - assistive person   Communication  0 - understands/communicates without difficulty   Swallowing  0 - swallows foods/liquids without difficulty   Equipment Currently Used at Home  none             Discharge Summary      Pastora Warren MD at 21 1254              [facility] HOSPITALIST MEDICINE DISCHARGE SUMMARY    Patient Identification:  Name:  Ale Gupta  Age:  75 y.o.  Sex:  female  :  1945  MRN:  [id number]  Visit Number:  [id number]    Date of Admission: 2021  Date of Discharge: 2021  DISCHARGE DISPOSITION   Stable  PCP: Neema Durbin APRN    DISCHARGE DIAGNOSIS : Acute kidney injury on CKD stage III, moderate, hypertension, chronic diastolic heart failure with no decompensation at this time, COPD without exacerbation, essential hypertension, anemia of chronic disease, GERD.  On O2 supplementation as needed at home.  Moderate mitral valve regurgitation.    HOSPITAL COURSE  Patient is a 75 y.o. female presented to [facility] after referral by hers primary care provider because of worsening of renal function.  She was admitted previously at [facility] in [place] for similar  presentation of renal failure, on admission she appears dehydrated, diuretics were held, she was given IV fluids cautiously and tolerated well with no decompensation of heart failure.  Part of the work-up includes CT scan of the abdomen pelvis revealing no obstruction no hydronephrosis.  X-ray of chest is unremarkable except for old left rib fracture.  2D echo shows normal ejection fraction, moderate pulmonary pretension, moderate mitral valve regurgitation.  After hydration patient's renal function is back to baseline, plan is to discharge her home today with home health for supervision of changes in her medication, she also uses oxygen at home 2 L as needed as per sister.  She will follow-up with her primary provider scheduled.  Regards to diuretics we discontinued Lasix, we continue Bumex.  Primary care provider will be checking her electrolytes and renal function post discharge.  Prior to discharge her O2 saturation on ambulation is above 94% room air persistently.    VITAL SIGNS:      07/30/21  1435 07/30/21  1945   Weight: 34 kg (75 lb) 30.7 kg (67 lb 9.6 oz)     Body mass index is 20.64 kg/m².  Vitals:    08/02/21 1154   BP:    Pulse:    Resp:    Temp:    SpO2: 91%     PHYSICAL EXAM:    My exam was done in the presence of Lu Vargas RN inside the room assisting my exam.    General: Chronically ill-appearing, comfortable,awake, alert, oriented to self, place, and time,  No respiratory distress.    Skin:  Skin is warm and dry. No rash noted. No pallor.    HENT:  Head:  Normocephalic and atraumatic.  Mouth:  Moist mucous membranes.    She is edentulous and uses dentures.  Eyes:  Conjunctivae and EOM are normal.  Pupils are equal, round, and reactive to light.  No scleral icterus.    Neck:  Neck supple.  She has JVD, no bruit,   Pulmonary/Chest:  No respiratory distress, no wheezes, no crackles, with normal breath sounds and good air movement.  Cardiovascular: Very distant heart sound I could not hear any  obvious murmur or gallop no rub.    Abdominal:  Soft.  Bowel sounds are normal.  No distension and no tenderness.   Extremities:  No edema, no tenderness, and no deformity.  No red or swollen joints anywhere.  Strong pulses in all 4 extremities with no clubbing, no cyanosis,   Neurological:  Motor strength equal no obvious deficit, sensory grossly intact.   No cranial nerve deficit.  No tongue deviation.  No facial droop.  No slurred speech.    Genitourinary: No Rutledge catheter  Back:  ----------    DISCHARGE MEDICATIONS:     Discharge Medications      Changes to Medications      Instructions Start Date   metoprolol succinate  MG 24 hr tablet  Commonly known as: TOPROL-XL  What changed: when to take this   100 mg, Oral, Daily         Continue These Medications      Instructions Start Date   albuterol (2.5 MG/3ML) 0.083% nebulizer solution  Commonly known as: PROVENTIL   2.5 mg, Nebulization, Every 4 Hours PRN      budesonide-formoterol 160-4.5 MCG/ACT inhaler  Commonly known as: SYMBICORT   2 puffs, Inhalation, Daily      bumetanide 1 MG tablet  Commonly known as: BUMEX   1 mg, Oral, 2 Times Daily      docusate sodium 100 MG capsule  Commonly known as: COLACE   100 mg, Oral, 2 Times Daily PRN      fluticasone 50 MCG/ACT nasal spray  Commonly known as: FLONASE   2 sprays, Nasal, Daily      omeprazole 20 MG capsule  Commonly known as: priLOSEC   20 mg, Oral, Daily      potassium chloride 10 MEQ CR tablet  Commonly known as: K-DUR,KLOR-CON   10 mEq, Oral, Daily         Stop These Medications    amLODIPine 10 MG tablet  Commonly known as: NORVASC     furosemide 40 MG tablet  Commonly known as: LASIX     hydrALAZINE 25 MG tablet  Commonly known as: APRESOLINE              Your Scheduled Appointments    Aug 10, 2021 10:30 AM  Hospital Follow Up with REBEKAH Peacock  [address] [address] [phone]             Additional Instructions  for the Follow-ups that You Need to Schedule     Ambulatory Referral to Home Health   As directed      Face to Face Visit Date: 8/2/2021    Follow-up provider for Plan of Care?: I treated the patient in an acute care facility and will not continue treatment after discharge.    Follow-up provider: INGRID STOVER [[id number]]    Reason/Clinical Findings: Debilitated, COPD, pulmonaryhypertension, changes on medication requiring supervision.    Describe mobility limitations that make leaving home difficult: Debilitated, COPD, chronic hypoxic respiratory failure, pulmonary hypertension,    Nursing/Therapeutic Services Requested: Skilled Nursing    Skilled nursing orders: Medication education O2 instruction COPD management    Frequency: 1 Week 1         Discharge Follow-up with PCP   As directed       Currently Documented PCP:    Ingrid Stover APRN    PCP Phone Number:    [phone]     Follow Up Details: REBEKAH Peacock           Follow-up Information     Ingrid Stover APRN .    Specialty: Family Medicine  Why: REBEKAH Peacock  Contact information:  [address] [place], [place] [id number] [phone]                   Pastora Warren MD  08/02/21  12:54 EDT    Please note that this discharge summary required more than 30 minutes to complete.      Electronically signed by Pastora Warren MD at 08/02/21 1302       Discharge Order (From admission, onward)     Start     Ordered    08/02/21 1237  Discharge patient  Once     Expected Discharge Date: 08/02/21    Discharge Disposition: Home-Health Care [facility] [place]    Physician of Record for Attribution - Please select from Treatment Team: EDELMIRA JIMENEZ [[id number]]    Review needed by CMO to determine Physician of Record: No       Question Answer Comment   Physician of Record for Attribution - Please select from Treatment Team EDELMIRA JIMENEZ    Review needed by CMO to determine Physician of Record No        08/02/21 4100

## 2021-08-02 NOTE — CASE MANAGEMENT/SOCIAL WORK
Discharge Planning Assessment  [facility]     Patient Name: Ale Gupta  MRN: [id number]  Today's Date: 8/2/2021    Admit Date: 7/30/2021    Discharge Plan     Row Name 08/02/21 1455       Plan    Plan Pt had discharge orders for this date that have been cancelled. SS had spoken with pt regarding HH orders. Pt prefers [facility]. Pt lives at [address] [place] [address] in [place]. SS faxed referral to [facility] [phone]. SS confirmed referral with [place] at [facility] [phone]. SS left message notifying [facility] that discharge had been postponed. SS following.        Continued Care and Services - Admitted Since 7/30/2021     Home Medical Care     Service Provider Request Status Selected Services Address Phone Fax Patient Preferred    [facility]  Pending - No Request Sent N/A [address] [place] [address] [phone] --              JAVON Navarro

## 2021-08-02 NOTE — DISCHARGE SUMMARY
[facility] HOSPITALIST MEDICINE DISCHARGE SUMMARY    Patient Identification:  Name:  Ale Gupta  Age:  75 y.o.  Sex:  female  :  1945  MRN:  [id number]  Visit Number:  [id number]    Date of Admission: 2021  Date of Discharge: 2021  DISCHARGE DISPOSITION   Stable  PCP: Neema Durbin APRN    DISCHARGE DIAGNOSIS : Acute kidney injury on CKD stage III, moderate, hypertension, chronic diastolic heart failure with no decompensation at this time, COPD without exacerbation, essential hypertension, anemia of chronic disease, GERD.  On O2 supplementation as needed at home.  Moderate mitral valve regurgitation.    HOSPITAL COURSE  Patient is a 75 y.o. female presented to [facility] after referral by hers primary care provider because of worsening of renal function.  She was admitted previously at [facility] in [place] for similar presentation of renal failure, on admission she appears dehydrated, diuretics were held, she was given IV fluids cautiously and tolerated well with no decompensation of heart failure.  Part of the work-up includes CT scan of the abdomen pelvis revealing no obstruction no hydronephrosis.  X-ray of chest is unremarkable except for old left rib fracture.  2D echo shows normal ejection fraction, moderate pulmonary pretension, moderate mitral valve regurgitation.  After hydration patient's renal function is back to baseline, plan is to discharge her home today with home health for supervision of changes in her medication, she also uses oxygen at home 2 L as needed as per sister.  She will follow-up with her primary provider scheduled.  Regards to diuretics we discontinued Lasix, we continue Bumex.  Primary care provider will be checking her electrolytes and renal function post discharge.  Prior to discharge her O2 saturation on ambulation is above 94% room air persistently.    VITAL SIGNS:      21  1435 21   Weight: 34  kg (75 lb) 30.7 kg (67 lb 9.6 oz)     Body mass index is 20.64 kg/m².  Vitals:    08/02/21 1154   BP:    Pulse:    Resp:    Temp:    SpO2: 91%     PHYSICAL EXAM:    My exam was done in the presence of Lu Vargas RN inside the room assisting my exam.    General: Chronically ill-appearing, comfortable,awake, alert, oriented to self, place, and time,  No respiratory distress.    Skin:  Skin is warm and dry. No rash noted. No pallor.    HENT:  Head:  Normocephalic and atraumatic.  Mouth:  Moist mucous membranes.    She is edentulous and uses dentures.  Eyes:  Conjunctivae and EOM are normal.  Pupils are equal, round, and reactive to light.  No scleral icterus.    Neck:  Neck supple.  She has JVD, no bruit,   Pulmonary/Chest:  No respiratory distress, no wheezes, no crackles, with normal breath sounds and good air movement.  Cardiovascular: Very distant heart sound I could not hear any obvious murmur or gallop no rub.    Abdominal:  Soft.  Bowel sounds are normal.  No distension and no tenderness.   Extremities:  No edema, no tenderness, and no deformity.  No red or swollen joints anywhere.  Strong pulses in all 4 extremities with no clubbing, no cyanosis,   Neurological:  Motor strength equal no obvious deficit, sensory grossly intact.   No cranial nerve deficit.  No tongue deviation.  No facial droop.  No slurred speech.    Genitourinary: No Rutledge catheter  Back:  ----------    DISCHARGE MEDICATIONS:     Discharge Medications      Changes to Medications      Instructions Start Date   metoprolol succinate  MG 24 hr tablet  Commonly known as: TOPROL-XL  What changed: when to take this   100 mg, Oral, Daily         Continue These Medications      Instructions Start Date   albuterol (2.5 MG/3ML) 0.083% nebulizer solution  Commonly known as: PROVENTIL   2.5 mg, Nebulization, Every 4 Hours PRN      budesonide-formoterol 160-4.5 MCG/ACT inhaler  Commonly known as: SYMBICORT   2 puffs, Inhalation, Daily       bumetanide 1 MG tablet  Commonly known as: BUMEX   1 mg, Oral, 2 Times Daily      docusate sodium 100 MG capsule  Commonly known as: COLACE   100 mg, Oral, 2 Times Daily PRN      fluticasone 50 MCG/ACT nasal spray  Commonly known as: FLONASE   2 sprays, Nasal, Daily      omeprazole 20 MG capsule  Commonly known as: priLOSEC   20 mg, Oral, Daily      potassium chloride 10 MEQ CR tablet  Commonly known as: K-DUR,KLOR-CON   10 mEq, Oral, Daily         Stop These Medications    amLODIPine 10 MG tablet  Commonly known as: NORVASC     furosemide 40 MG tablet  Commonly known as: LASIX     hydrALAZINE 25 MG tablet  Commonly known as: APRESOLINE              Your Scheduled Appointments    Aug 10, 2021 10:30 AM  Hospital Follow Up with REBEKAH Peacock  [facility] [address] [address] [phone]             Additional Instructions for the Follow-ups that You Need to Schedule     Ambulatory Referral to Home Health   As directed      Face to Face Visit Date: 8/2/2021    Follow-up provider for Plan of Care?: I treated the patient in an acute care facility and will not continue treatment after discharge.    Follow-up provider: INGRID DURBIN [[id number]]    Reason/Clinical Findings: Debilitated, COPD, pulmonaryhypertension, changes on medication requiring supervision.    Describe mobility limitations that make leaving home difficult: Debilitated, COPD, chronic hypoxic respiratory failure, pulmonary hypertension,    Nursing/Therapeutic Services Requested: Skilled Nursing    Skilled nursing orders: Medication education O2 instruction COPD management    Frequency: 1 Week 1         Discharge Follow-up with PCP   As directed       Currently Documented PCP:    Ingrid Durbin APRN    PCP Phone Number:    [phone]     Follow Up Details: REBEKAH Peacock           Follow-up Information     Ingrid Durbin APRN .    Specialty: Family Medicine  Why: Ingrid  REBEKAH Durbin  Contact information:  [address] [facility] [address]  [phone]                   Pastora Warren MD  08/02/21  12:54 EDT    Please note that this discharge summary required more than 30 minutes to complete.

## 2021-08-02 NOTE — PLAN OF CARE
Goal Outcome Evaluation:  Plan of Care Reviewed With: patient        Progress: no change  Outcome Summary: Pt rested well tonight. VS stable. will continue to monitor

## 2021-08-03 LAB
ANION GAP SERPL CALCULATED.3IONS-SCNC: 10.9 MMOL/L (ref 5–15)
ANISOCYTOSIS BLD QL: NORMAL
BASO STIPL COARSE BLD QL SMEAR: NORMAL
BASOPHILS # BLD AUTO: 0.02 10*3/MM3 (ref 0–0.2)
BASOPHILS NFR BLD AUTO: 0.4 % (ref 0–1.5)
BUN SERPL-MCNC: 39 MG/DL (ref 8–23)
BUN/CREAT SERPL: 26.5 (ref 7–25)
CALCIUM SPEC-SCNC: 9 MG/DL (ref 8.6–10.5)
CHLORIDE SERPL-SCNC: 99 MMOL/L (ref 98–107)
CO2 SERPL-SCNC: 29.1 MMOL/L (ref 22–29)
CREAT SERPL-MCNC: 1.47 MG/DL (ref 0.57–1)
DACRYOCYTES BLD QL SMEAR: NORMAL
DEPRECATED RDW RBC AUTO: 71.1 FL (ref 37–54)
EOSINOPHIL # BLD AUTO: 0 10*3/MM3 (ref 0–0.4)
EOSINOPHIL NFR BLD AUTO: 0 % (ref 0.3–6.2)
ERYTHROCYTE [DISTWIDTH] IN BLOOD BY AUTOMATED COUNT: 20.8 % (ref 12.3–15.4)
GFR SERPL CREATININE-BSD FRML MDRD: 35 ML/MIN/1.73
GLUCOSE SERPL-MCNC: 112 MG/DL (ref 65–99)
HCT VFR BLD AUTO: 26.1 % (ref 34–46.6)
HGB BLD-MCNC: 8 G/DL (ref 12–15.9)
HYPOCHROMIA BLD QL: NORMAL
IMM GRANULOCYTES # BLD AUTO: 0.03 10*3/MM3 (ref 0–0.05)
IMM GRANULOCYTES NFR BLD AUTO: 0.7 % (ref 0–0.5)
LYMPHOCYTES # BLD AUTO: 0.67 10*3/MM3 (ref 0.7–3.1)
LYMPHOCYTES NFR BLD AUTO: 15.1 % (ref 19.6–45.3)
MACROCYTES BLD QL SMEAR: NORMAL
MAGNESIUM SERPL-MCNC: 1.8 MG/DL (ref 1.6–2.4)
MCH RBC QN AUTO: 29.4 PG (ref 26.6–33)
MCHC RBC AUTO-ENTMCNC: 30.7 G/DL (ref 31.5–35.7)
MCV RBC AUTO: 96 FL (ref 79–97)
MONOCYTES # BLD AUTO: 0.49 10*3/MM3 (ref 0.1–0.9)
MONOCYTES NFR BLD AUTO: 11 % (ref 5–12)
NEUTROPHILS NFR BLD AUTO: 3.24 10*3/MM3 (ref 1.7–7)
NEUTROPHILS NFR BLD AUTO: 72.8 % (ref 42.7–76)
NRBC BLD AUTO-RTO: 0 /100 WBC (ref 0–0.2)
OVALOCYTES BLD QL SMEAR: NORMAL
PLAT MORPH BLD: NORMAL
PLATELET # BLD AUTO: 351 10*3/MM3 (ref 140–450)
PMV BLD AUTO: 11.5 FL (ref 6–12)
POLYCHROMASIA BLD QL SMEAR: NORMAL
POTASSIUM SERPL-SCNC: 4.1 MMOL/L (ref 3.5–5.2)
RBC # BLD AUTO: 2.72 10*6/MM3 (ref 3.77–5.28)
SODIUM SERPL-SCNC: 139 MMOL/L (ref 136–145)
WBC # BLD AUTO: 4.45 10*3/MM3 (ref 3.4–10.8)

## 2021-08-03 PROCEDURE — 94799 UNLISTED PULMONARY SVC/PX: CPT

## 2021-08-03 PROCEDURE — 97535 SELF CARE MNGMENT TRAINING: CPT | Performed by: OCCUPATIONAL THERAPIST

## 2021-08-03 PROCEDURE — 80048 BASIC METABOLIC PNL TOTAL CA: CPT | Performed by: HOSPITALIST

## 2021-08-03 PROCEDURE — 97110 THERAPEUTIC EXERCISES: CPT | Performed by: OCCUPATIONAL THERAPIST

## 2021-08-03 PROCEDURE — 83735 ASSAY OF MAGNESIUM: CPT | Performed by: HOSPITALIST

## 2021-08-03 PROCEDURE — 99232 SBSQ HOSP IP/OBS MODERATE 35: CPT | Performed by: HOSPITALIST

## 2021-08-03 PROCEDURE — 85007 BL SMEAR W/DIFF WBC COUNT: CPT | Performed by: HOSPITALIST

## 2021-08-03 PROCEDURE — 85025 COMPLETE CBC W/AUTO DIFF WBC: CPT | Performed by: HOSPITALIST

## 2021-08-03 RX ORDER — BUMETANIDE 1 MG/1
1 TABLET ORAL DAILY
Status: DISCONTINUED | OUTPATIENT
Start: 2021-08-04 | End: 2021-08-04

## 2021-08-03 RX ADMIN — MAGNESIUM GLUCONATE 500 MG ORAL TABLET 400 MG: 500 TABLET ORAL at 08:37

## 2021-08-03 RX ADMIN — METOPROLOL SUCCINATE 100 MG: 50 TABLET, EXTENDED RELEASE ORAL at 08:37

## 2021-08-03 RX ADMIN — ALBUTEROL SULFATE 2.5 MG: 2.5 SOLUTION RESPIRATORY (INHALATION) at 07:27

## 2021-08-03 RX ADMIN — BUMETANIDE 1 MG: 1 TABLET ORAL at 08:37

## 2021-08-03 RX ADMIN — BUDESONIDE AND FORMOTEROL FUMARATE DIHYDRATE 2 PUFF: 160; 4.5 AEROSOL RESPIRATORY (INHALATION) at 07:28

## 2021-08-03 RX ADMIN — PANTOPRAZOLE SODIUM 40 MG: 40 TABLET, DELAYED RELEASE ORAL at 05:52

## 2021-08-03 RX ADMIN — FLUTICASONE PROPIONATE 2 SPRAY: 50 SPRAY, METERED NASAL at 08:37

## 2021-08-03 RX ADMIN — SODIUM CHLORIDE, PRESERVATIVE FREE 10 ML: 5 INJECTION INTRAVENOUS at 20:32

## 2021-08-03 RX ADMIN — SODIUM CHLORIDE, PRESERVATIVE FREE 10 ML: 5 INJECTION INTRAVENOUS at 08:37

## 2021-08-03 NOTE — PROGRESS NOTES
I was able to get hold of Ab Gupta patient's brother who is involved in decision making, he stated patient was just discharged [facility] and is scheduled to have endoscopy for anemia, he is not aware of cardiac arrhythmia or a flutter or A. Fib.  After discussion with him the pros and cons of anticoagulation and risk involved off or on anticoagulation, he opted to wait for now until he talks to sister.  He is agreeable at this time not to start the patient full dose anticoagulation until he talks to the sisters.

## 2021-08-03 NOTE — THERAPY TREATMENT NOTE
Acute Care - Occupational Therapy Treatment Note   [facility]     Patient Name: Ale Gupta  : 1945  MRN: [id number]  Today's Date: 8/3/2021  Onset of Illness/Injury or Date of Surgery: 21  Date of Referral to OT: 21  Referring Physician: Dr. Moss    Admit Date: 2021       ICD-10-CM ICD-9-CM   1. Acute renal failure, unspecified acute renal failure type (CMS/HCC)  N17.9 584.9   2. COPD mixed type (CMS/HCC)  J44.9 496     Patient Active Problem List   Diagnosis   • Closed bimalleolar fracture of right ankle   • Essential hypertension   • Chronic allergic rhinitis   • Gastroesophageal reflux disease without esophagitis   • Bimalleolar fracture, right, closed, with routine healing, subsequent encounter   • Closed fracture of left distal radius   • Essential tremor   • COPD mixed type (CMS/HCC)   • Acute kidney injury (CMS/HCC)   • Congestive heart failure (CMS/HCC)   • Acute renal failure (ARF) (CMS/HCC)     Past Medical History:   Diagnosis Date   • Allergy    • Arthritis    • Asthma    • Congestive heart failure (CHF) (CMS/HCC)    • COPD (chronic obstructive pulmonary disease) (CMS/HCC)    • GERD (gastroesophageal reflux disease)    • Hypertension    • Stroke (CMS/HCC)     TIA     Past Surgical History:   Procedure Laterality Date   • CATARACT EXTRACTION     • COLONOSCOPY     • ENDOSCOPY     • FRACTURE SURGERY      LEFT ARM    • HERNIA REPAIR     • ORIF ULNA/RADIUS FRACTURES Left 2019    Procedure: ULNA/RADIUS OPEN REDUCTION INTERNAL FIXATION, radius only;  Surgeon: Maverick Stevenson MD;  Location: [facility];  Service: Orthopedics            OT ASSESSMENT FLOWSHEET (last 12 hours)      OT Evaluation and Treatment     Row Name 21 1234                   OT Time and Intention    Subjective Information  no complaints  -BF        Document Type  therapy note (daily note)  -BF        Mode of Treatment  occupational therapy  -BF        Patient Effort  good  -BF        Symptoms Noted  anxiety is getting worse    Was seen here yesterday for it    Consent on file for [facility] During/After Treatment  none  -BF        Comment  Pt sitting in chair, agreeable to OT  -BF           General Information    Existing Precautions/Restrictions  fall;oxygen therapy device and L/min  -BF           Cognition    Orientation Status (Cognition)  oriented to;person;place  -BF        Follows Commands (Cognition)  follows one-step commands;verbal cues/prompting required;repetition of directions required  -BF           Transfer Assessment/Treatment    Transfers  chair-bed transfer  -BF           Transfers    Chair-Bed Dickens (Transfers)  standby assist;verbal cues;nonverbal cues (demo/gesture)  -BF           Motor Skills    Motor Skills  motor control/coordination interventions  -BF        Motor Control/Coordination Interventions  gross motor coordination activities;therapeutic exercise/ROM BUE GMC/strengthening w/ theraband, handgripper  -BF           Positioning and Restraints    In Bed  supine;call light within reach;encouraged to call for assist  -BF          User Key  (r) = Recorded By, (t) = Taken By, (c) = Cosigned By    Initials Name Effective Dates     Rachel Longo OT 06/16/21 -                  OT Recommendation and Plan  Planned Therapy Interventions (OT): activity tolerance training, BADL retraining, ROM/therapeutic exercise, strengthening exercise, transfer/mobility retraining           Time Calculation:   Time Calculation- OT     Row Name 08/03/21 1237             Time Calculation- OT    Total Timed Code Minutes- OT  25 minute(s)  -BF        User Key  (r) = Recorded By, (t) = Taken By, (c) = Cosigned By    Initials Name Provider Type    BF Rachel Longo OT Occupational Therapist        Therapy Charges for Today     Code Description Service Date Service Provider Modifiers Qty    16172150132  OT EVAL MOD COMPLEXITY 4 8/2/2021 Rachel Longo OT GO 1    [id number]  OT SELF CARE/MGMT/TRAIN EA 15 MIN 8/3/2021 Rachel Longo OT GO 1    [id number]   OT THER PROC EA 15 MIN 8/3/2021 Rachel Longo, OT GO 1               Rachel Longo, KATHRIN  8/3/2021

## 2021-08-03 NOTE — PLAN OF CARE
Goal Outcome Evaluation:           Progress: no change  Outcome Summary: Pt rested well tonight. HR got up to 140 and went back down to 95. Jenn ADAMS notified. No complaints at this time. Will continue to monitor.

## 2021-08-03 NOTE — PROGRESS NOTES
[facility] HOSPITALIST PROGRESS NOTE     Patient Identification:  Name:  Ale Gupta  Age:  75 y.o.  Sex:  female  :  1945  MRN:  [id number]  Visit Number:  [id number]  Primary Care Provider:  Neema Durbin APRN    Length of stay:  4    Subjective: Patient seen and examined assisted by Hannah Franco RN.  Patient is awake and alert, very pleasant, review of telemetry showed patient has paroxysmal a flutter with rapid ventricular response.  Patient is not aware of history of cardiac arrhythmia, denies melena or hematemesis.  Review of chart patient received 1 unit packed RBC due to worsening of anemia, she is scheduled to have endoscopy by gastroenterologist outpatient for anemia.  I have attempted to call sister by phone Ms. Lea Andrews unfortunately no one is answering.  Trying to make some inquiry if the patient has history of A. fib or arrhythmia in the past considering she is on Toprol 100 mg twice a day.  Currently her heart rate is well controlled blood pressure is well controlled with once a day Toprol, 2D echo shows normal ejection fraction. Her [id number] score is 4.  She is very frail, malnourished, has multiple bone previous healed fractures on x-ray, and with history of anemia without knowing the cause at this time I am very reluctant to start the patient on anticoagulation until I talked to the sister.  Attempted to talk to the patient but would refer to her sister to decide.    Chief Complaint: Cardiac arrhythmia  ----------------------------------------------------------------------------------------------------------------------  Current Hospital Meds:  budesonide-formoterol, 2 puff, Inhalation, Daily  [START ON 2021] bumetanide, 1 mg, Oral, Daily  fluticasone, 2 spray, Nasal, Daily  magnesium oxide, 400 mg, Oral, Daily  metoprolol succinate XL, 100 mg, Oral, Q24H  pantoprazole, 40 mg, Oral, Q AM  sodium chloride, 10 mL, Intravenous, Q12H          ----------------------------------------------------------------------------------------------------------------------  Vital Signs:  Temp:  [98 °F (36.7 °C)-98.8 °F (37.1 °C)] 98 °F (36.7 °C)  Heart Rate:  [] 84  Resp:  [16-18] 18  BP: (115-136)/(52-77) 115/53       Tele: Sinus rhythm 87 bpm      07/30/21  1435 07/30/21 1945   Weight: 34 kg (75 lb) 30.7 kg (67 lb 9.6 oz)     Body mass index is 20.64 kg/m².    Intake/Output Summary (Last 24 hours) at 8/3/2021 1403  Last data filed at 8/3/2021 1257  Gross per 24 hour   Intake 1560 ml   Output --   Net 1560 ml     Diet Regular  ----------------------------------------------------------------------------------------------------------------------  Physical exam:  General: Comfortable,awake, alert, oriented to self, place, very pleasant chronically ill-appearing she is malnourished.   No respiratory distress.    Skin:  Skin is warm and dry. No rash noted. No pallor.    HENT:  Head:  Normocephalic and atraumatic.  Mouth:  Moist mucous membranes.    Edentulous  Eyes:  Conjunctivae and EOM are normal.  Pupils are equal, round, and reactive to light.  No scleral icterus.    Neck:  Neck supple.  No JVD present.    No bruit  Pulmonary/Chest:  No respiratory distress, no wheezes, no crackles, with normal breath sounds and good air movement.  Chest wall bones ribs are very prominent she has lost all her muscles  Cardiovascular:  Normal rate, regular rhythm and normal heart sounds with no murmur.  Abdominal:  Soft.  Bowel sounds are normal.  No distension and no tenderness.   Extremities:  No edema, no tenderness, and no deformity.  No red or swollen joints anywhere.  Strong pulses in all 4 extremities with no clubbing, no cyanosis, no edema.  Loss of muscle bulk in both upper and lower extremity  Neurological:  Motor strength equal no obvious deficit, sensory grossly intact.   No cranial nerve deficit.  No tongue deviation.  No facial droop.  No slurred speech.     Genitourinary: No Rutledge catheter  Back:  ----------------------------------------------------------------------------------------------------------------------  ----------------------------------------------------------------------------------------------------------------------  Results from last 7 days   Lab Units 07/30/21  1523   CK TOTAL U/L 16*     Results from last 7 days   Lab Units 08/03/21  0748 08/02/21 0414 08/01/21 0926 07/31/21  0651 07/30/21  1545   WBC 10*3/mm3 4.45 4.01 4.97   < >  --    HEMOGLOBIN g/dL 8.0* 7.8* 8.3*   < >  --    HEMATOCRIT % 26.1* 24.8* 26.0*   < >  --    MCV fL 96.0 95.0 92.9   < >  --    MCHC g/dL 30.7* 31.5 31.9   < >  --    PLATELETS 10*3/mm3 351 316 342   < >  --    INR   --   --   --   --  1.13*    < > = values in this interval not displayed.         Results from last 7 days   Lab Units 08/03/21  0035 08/02/21 0414 08/01/21 0926 07/31/21  0651 07/30/21  1523 07/29/21  1038 07/29/21  1038   SODIUM mmol/L 139 140 140   < > 134*   < > 137   POTASSIUM mmol/L 4.1 4.6 2.9*   < > 3.5   < > 3.5   MAGNESIUM mg/dL 1.8 2.1 1.4*   < >  --   --   --    CHLORIDE mmol/L 99 104 99   < > 88*   < > 88*   CO2 mmol/L 29.1* 25.3 28.2   < > 32.8*   < > 33.3*   BUN mg/dL 39* 43* 53*   < > 89*   < > 80*   CREATININE mg/dL 1.47* 1.44* 1.55*   < > 3.07*   < > 2.34*   EGFR IF NONAFRICN AM mL/min/1.73 35* 36* 33*   < > 15*   < > 20*   CALCIUM mg/dL 9.0 8.7 8.7   < > 9.2   < > 9.0   GLUCOSE mg/dL 112* 111* 143*   < > 129*   < > 110*   ALBUMIN g/dL  --   --   --   --  4.18  --  4.40   BILIRUBIN mg/dL  --   --   --   --  1.1  --  1.1   ALK PHOS U/L  --   --   --   --  111  --  99   AST (SGOT) U/L  --   --   --   --  15  --  16   ALT (SGPT) U/L  --   --   --   --  14  --  19    < > = values in this interval not displayed.   Estimated Creatinine Clearance: 16.5 mL/min (A) (by C-G formula based on SCr of 1.47 mg/dL (H)).    No results found for: AMMONIA      No results found for: BLOODCX  No results  found for: URINECX  No results found for: WOUNDCX  No results found for: STOOLCX    I have personally looked at the labs and they are summarized above.  ----------------------------------------------------------------------------------------------------------------------  Imaging Results (Last 24 Hours)     ** No results found for the last 24 hours. **        ----------------------------------------------------------------------------------------------------------------------  Assessment and Plan:  -Paroxysmal atrial flutter with rapid medical response [id number] score of 4,  -Acute kidney injury on CKD stage III  -Severe malnourishment  -COPD without exacerbation  -Moderate pulmonary hypertension  -Diastolic heart failure compensated  -GERD  -Moderate mitral valve regurgitation    As mentioned above patient also high risk for bleed, I am reluctant at this time to start anticoagulation until I have a chance to talk to patient's sister who is making most of the decision.  I attempted to talk to the patient but she would prefer for his sister to decide.  We will hold off on full dose anticoagulation at this time considering patient is very frail, appears to history of multiple falls including rib fracture and wrist fracture.  And has anemia this undiagnosed as to what is the cause.      Disposition Home when stable      Pastora Warren MD  08/03/21  14:03 EDT

## 2021-08-03 NOTE — CASE MANAGEMENT/SOCIAL WORK
"Discharge Planning Assessment   [facility]     Patient Name: Ale Gupta  MRN: [id number]  Today's Date: 8/3/2021    Admit Date: 7/30/2021    Discharge Needs Assessment     Row Name 08/03/21 1156       Living Environment    Lives With  sibling(s)    Current Living Arrangements  home/apartment/condo    Primary Care Provided by  self    Provides Primary Care For  no one    Family Caregiver if Needed  sibling(s)    Quality of Family Relationships  helpful;involved;supportive    Able to Return to Prior Arrangements  yes       Resource/Environmental Concerns    Resource/Environmental Concerns  none       Transition Planning    Patient/Family Anticipates Transition to  home with family    Patient/Family Anticipated Services at Transition  home health care    Transportation Anticipated  family or friend will provide       Discharge Needs Assessment    Equipment Currently Used at Home  oxygen O2 @ 2L    Concerns to be Addressed  no discharge needs identified    Anticipated Changes Related to Illness  none    Equipment Needed After Discharge  none    Outpatient/Agency/Support Group Needs  homecare agency    Discharge Facility/Level of Care Needs  home with home health        Discharge Plan     Row Name 08/03/21 1154       Plan    Plan SS received consult for \"d/c planning; will need home health.\" Pt lives with sister. Pt will need , prefers [facility]. SS had sent referral to [facility] on 8/2 before discharge was postponed. Pt utilizes O2 @ 2L as needed. PCP is Neema Durbin. Pt does not have a POA or living will. Pt's family will provide transportation home at discharge ([address] [place] [address]).    Patient/Family in Agreement with Plan  yes        Continued Care and Services - Admitted Since 7/30/2021     Home Medical Care     Service Provider Request Status Selected Services Address Phone Fax Patient Preferred    [facility]  Pending - No Request Sent N/A [address] [place] [address] [facility] [phone] " [phone] --              JAVON Navarro

## 2021-08-03 NOTE — PLAN OF CARE
Goal Outcome Evaluation:           Progress: improving  Outcome Summary: Patient resting in bed. O2 stable at 2L NC. No complaints at this time.

## 2021-08-04 LAB
ANISOCYTOSIS BLD QL: NORMAL
ANISOCYTOSIS BLD QL: NORMAL
APTT PPP: 31.6 SECONDS (ref 25.5–35.4)
APTT PPP: 33 SECONDS (ref 25.5–35.4)
BASOPHILS # BLD AUTO: 0.01 10*3/MM3 (ref 0–0.2)
BASOPHILS # BLD AUTO: 0.02 10*3/MM3 (ref 0–0.2)
BASOPHILS NFR BLD AUTO: 0.3 % (ref 0–1.5)
BASOPHILS NFR BLD AUTO: 0.4 % (ref 0–1.5)
DACRYOCYTES BLD QL SMEAR: NORMAL
DACRYOCYTES BLD QL SMEAR: NORMAL
DEPRECATED RDW RBC AUTO: 72.3 FL (ref 37–54)
DEPRECATED RDW RBC AUTO: 75.5 FL (ref 37–54)
EOSINOPHIL # BLD AUTO: 0 10*3/MM3 (ref 0–0.4)
EOSINOPHIL # BLD AUTO: 0 10*3/MM3 (ref 0–0.4)
EOSINOPHIL NFR BLD AUTO: 0 % (ref 0.3–6.2)
EOSINOPHIL NFR BLD AUTO: 0 % (ref 0.3–6.2)
ERYTHROCYTE [DISTWIDTH] IN BLOOD BY AUTOMATED COUNT: 21 % (ref 12.3–15.4)
ERYTHROCYTE [DISTWIDTH] IN BLOOD BY AUTOMATED COUNT: 21.3 % (ref 12.3–15.4)
HCT VFR BLD AUTO: 24.3 % (ref 34–46.6)
HCT VFR BLD AUTO: 27.5 % (ref 34–46.6)
HGB BLD-MCNC: 7.3 G/DL (ref 12–15.9)
HGB BLD-MCNC: 8.3 G/DL (ref 12–15.9)
HYPOCHROMIA BLD QL: NORMAL
HYPOCHROMIA BLD QL: NORMAL
IMM GRANULOCYTES # BLD AUTO: 0.04 10*3/MM3 (ref 0–0.05)
IMM GRANULOCYTES # BLD AUTO: 0.05 10*3/MM3 (ref 0–0.05)
IMM GRANULOCYTES NFR BLD AUTO: 1 % (ref 0–0.5)
IMM GRANULOCYTES NFR BLD AUTO: 1.1 % (ref 0–0.5)
INR PPP: 1.06 (ref 0.9–1.1)
LYMPHOCYTES # BLD AUTO: 0.62 10*3/MM3 (ref 0.7–3.1)
LYMPHOCYTES # BLD AUTO: 0.65 10*3/MM3 (ref 0.7–3.1)
LYMPHOCYTES NFR BLD AUTO: 13.7 % (ref 19.6–45.3)
LYMPHOCYTES NFR BLD AUTO: 16.1 % (ref 19.6–45.3)
MACROCYTES BLD QL SMEAR: NORMAL
MACROCYTES BLD QL SMEAR: NORMAL
MCH RBC QN AUTO: 29.2 PG (ref 26.6–33)
MCH RBC QN AUTO: 30.2 PG (ref 26.6–33)
MCHC RBC AUTO-ENTMCNC: 30 G/DL (ref 31.5–35.7)
MCHC RBC AUTO-ENTMCNC: 30.2 G/DL (ref 31.5–35.7)
MCV RBC AUTO: 100 FL (ref 79–97)
MCV RBC AUTO: 97.2 FL (ref 79–97)
MONOCYTES # BLD AUTO: 0.34 10*3/MM3 (ref 0.1–0.9)
MONOCYTES # BLD AUTO: 0.39 10*3/MM3 (ref 0.1–0.9)
MONOCYTES NFR BLD AUTO: 8.2 % (ref 5–12)
MONOCYTES NFR BLD AUTO: 8.8 % (ref 5–12)
NEUTROPHILS NFR BLD AUTO: 2.84 10*3/MM3 (ref 1.7–7)
NEUTROPHILS NFR BLD AUTO: 3.63 10*3/MM3 (ref 1.7–7)
NEUTROPHILS NFR BLD AUTO: 73.8 % (ref 42.7–76)
NEUTROPHILS NFR BLD AUTO: 76.6 % (ref 42.7–76)
NRBC BLD AUTO-RTO: 0 /100 WBC (ref 0–0.2)
NRBC BLD AUTO-RTO: 0 /100 WBC (ref 0–0.2)
OVALOCYTES BLD QL SMEAR: NORMAL
OVALOCYTES BLD QL SMEAR: NORMAL
PLAT MORPH BLD: NORMAL
PLAT MORPH BLD: NORMAL
PLATELET # BLD AUTO: 341 10*3/MM3 (ref 140–450)
PLATELET # BLD AUTO: 356 10*3/MM3 (ref 140–450)
PMV BLD AUTO: 12 FL (ref 6–12)
PMV BLD AUTO: 12.2 FL (ref 6–12)
POLYCHROMASIA BLD QL SMEAR: NORMAL
PROTHROMBIN TIME: 14.2 SECONDS (ref 12.8–14.5)
RBC # BLD AUTO: 2.5 10*6/MM3 (ref 3.77–5.28)
RBC # BLD AUTO: 2.75 10*6/MM3 (ref 3.77–5.28)
WBC # BLD AUTO: 3.85 10*3/MM3 (ref 3.4–10.8)
WBC # BLD AUTO: 4.74 10*3/MM3 (ref 3.4–10.8)

## 2021-08-04 PROCEDURE — 85730 THROMBOPLASTIN TIME PARTIAL: CPT | Performed by: HOSPITALIST

## 2021-08-04 PROCEDURE — 99233 SBSQ HOSP IP/OBS HIGH 50: CPT | Performed by: HOSPITALIST

## 2021-08-04 PROCEDURE — 85007 BL SMEAR W/DIFF WBC COUNT: CPT | Performed by: HOSPITALIST

## 2021-08-04 PROCEDURE — 94799 UNLISTED PULMONARY SVC/PX: CPT

## 2021-08-04 PROCEDURE — 85025 COMPLETE CBC W/AUTO DIFF WBC: CPT | Performed by: HOSPITALIST

## 2021-08-04 PROCEDURE — 25010000002 HEPARIN (PORCINE) PER 1000 UNITS: Performed by: HOSPITALIST

## 2021-08-04 PROCEDURE — 85610 PROTHROMBIN TIME: CPT | Performed by: HOSPITALIST

## 2021-08-04 RX ORDER — BUMETANIDE 1 MG/1
1 TABLET ORAL
Status: DISCONTINUED | OUTPATIENT
Start: 2021-08-04 | End: 2021-08-05

## 2021-08-04 RX ORDER — HEPARIN SODIUM 10000 [USP'U]/100ML
12 INJECTION, SOLUTION INTRAVENOUS
Status: DISCONTINUED | OUTPATIENT
Start: 2021-08-04 | End: 2021-08-06

## 2021-08-04 RX ORDER — HEPARIN SODIUM 5000 [USP'U]/ML
60 INJECTION, SOLUTION INTRAVENOUS; SUBCUTANEOUS AS NEEDED
Status: DISCONTINUED | OUTPATIENT
Start: 2021-08-04 | End: 2021-08-06 | Stop reason: HOSPADM

## 2021-08-04 RX ORDER — HEPARIN SODIUM 5000 [USP'U]/ML
30 INJECTION, SOLUTION INTRAVENOUS; SUBCUTANEOUS AS NEEDED
Status: DISCONTINUED | OUTPATIENT
Start: 2021-08-04 | End: 2021-08-06 | Stop reason: HOSPADM

## 2021-08-04 RX ADMIN — PANTOPRAZOLE SODIUM 40 MG: 40 TABLET, DELAYED RELEASE ORAL at 05:23

## 2021-08-04 RX ADMIN — MAGNESIUM GLUCONATE 500 MG ORAL TABLET 400 MG: 500 TABLET ORAL at 08:46

## 2021-08-04 RX ADMIN — HEPARIN SODIUM 1800 UNITS: 5000 INJECTION INTRAVENOUS; SUBCUTANEOUS at 20:05

## 2021-08-04 RX ADMIN — BUMETANIDE 1 MG: 1 TABLET ORAL at 08:46

## 2021-08-04 RX ADMIN — BUMETANIDE 1 MG: 1 TABLET ORAL at 17:22

## 2021-08-04 RX ADMIN — METOPROLOL SUCCINATE 100 MG: 50 TABLET, EXTENDED RELEASE ORAL at 08:46

## 2021-08-04 RX ADMIN — SODIUM CHLORIDE, PRESERVATIVE FREE 10 ML: 5 INJECTION INTRAVENOUS at 20:08

## 2021-08-04 RX ADMIN — HEPARIN SODIUM 12 UNITS/KG/HR: 10000 INJECTION, SOLUTION INTRAVENOUS at 13:41

## 2021-08-04 RX ADMIN — BUDESONIDE AND FORMOTEROL FUMARATE DIHYDRATE 2 PUFF: 160; 4.5 AEROSOL RESPIRATORY (INHALATION) at 07:47

## 2021-08-04 RX ADMIN — DOCUSATE SODIUM 100 MG: 100 CAPSULE, LIQUID FILLED ORAL at 22:07

## 2021-08-04 RX ADMIN — SODIUM CHLORIDE, PRESERVATIVE FREE 10 ML: 5 INJECTION INTRAVENOUS at 08:46

## 2021-08-04 RX ADMIN — FLUTICASONE PROPIONATE 2 SPRAY: 50 SPRAY, METERED NASAL at 08:46

## 2021-08-04 NOTE — PROGRESS NOTES
[facility] HOSPITALIST PROGRESS NOTE     Patient Identification:  Name:  Ale Gupta  Age:  75 y.o.  Sex:  female  :  1945  MRN:  [id number]  Visit Number:  [id number]  Primary Care Provider:  Neema Durbin APRN    Length of stay:  5    Subjective: Patient seen and examined assisted by Hannah Franco RN. I was able to get hold of patient's brother Ab. Yesterday he informed me that patient was discharged recently at [facility] due to CHF, apparently fluid was taken out of her chest which he described with and it appears to be a thoracentesis. He is also aware of the anemia and scheduled  GI appointment. Regarding anticoagulation, he wanted to proceed with anticoagulation, fully aware of the risk possibly of bleed. Will monitor H&H, will start with heparin and if she tolerates then will transition to direct oral anticoagulant. Pharmacy has been informed.    Patient just came back from shower she looks much better she appears better she is eating well no complaints at this time. Does not appear hypervolemic. Very pleasant. Telemetry reviewed there is no recurrence or episode of atrial flutter or A. Fib.    Chief Complaint: Short of breat  ----------------------------------------------------------------------------------------------------------------------  Current Hospital Meds:  budesonide-formoterol, 2 puff, Inhalation, Daily  bumetanide, 1 mg, Oral, BID  fluticasone, 2 spray, Nasal, Daily  magnesium oxide, 400 mg, Oral, Daily  metoprolol succinate XL, 100 mg, Oral, Q24H  pantoprazole, 40 mg, Oral, Q AM  sodium chloride, 10 mL, Intravenous, Q12H         ----------------------------------------------------------------------------------------------------------------------  Vital Signs:  Temp:  [97.4 °F (36.3 °C)-98.4 °F (36.9 °C)] 97.8 °F (36.6 °C)  Heart Rate:  [85-95] 89  Resp:  [16-18] 18  BP: (116-138)/(46-70) 138/57       Tele: Sinus rhythm 87 bpm      21  1435  07/30/21 1945   Weight: 34 kg (75 lb) 30.7 kg (67 lb 9.6 oz)     Body mass index is 20.64 kg/m².    Intake/Output Summary (Last 24 hours) at 8/4/2021 1206  Last data filed at 8/4/2021 1025  Gross per 24 hour   Intake 1080 ml   Output --   Net 1080 ml     Diet Regular  ----------------------------------------------------------------------------------------------------------------------  Physical exam:  General: Chronically ill-appearing, malnourished, pleasant, able to communicate appropriately. Comfortable,awake, alert, oriented to self, place, and time,   No respiratory distress.    Skin:  Skin is warm and dry. No rash noted. No pallor.    HENT:  Head:  Normocephalic and atraumatic.  Mouth:  Moist mucous membranes.    Eyes:  Conjunctivae and EOM are normal.  Pupils are equal, round, and reactive to light.  No scleral icterus.    Neck:  Neck supple.  No JVD present.   No bruit  Pulmonary/Chest:  No respiratory distress, no wheezes, no crackles, with normal breath sounds and good air movement.  Cardiovascular:  Normal rate, regular rhythm and normal heart sounds with no murmur.  Abdominal:  Soft.  Bowel sounds are normal.  No distension and no tenderness.   Extremities:  No edema, no tenderness, and no deformity.  No red or swollen joints anywhere.  Strong pulses in all 4 extremities with no clubbing, no cyanosis, no edema.  Neurological:  Motor strength equal no obvious deficit, sensory grossly intact.   No cranial nerve deficit.  No tongue deviation.  No facial droop.  No slurred speech.    Genitourinary: No Rutledge catheter  Back:  ----------------------------------------------------------------------------------------------------------------------  ----------------------------------------------------------------------------------------------------------------------  Results from last 7 days   Lab Units 07/30/21  1523   CK TOTAL U/L 16*     Results from last 7 days   Lab Units 08/04/21  0146 08/03/21  0748  08/02/21 0414 07/31/21  0651 07/30/21  1545   WBC 10*3/mm3 3.85 4.45 4.01   < >  --    HEMOGLOBIN g/dL 7.3* 8.0* 7.8*   < >  --    HEMATOCRIT % 24.3* 26.1* 24.8*   < >  --    MCV fL 97.2* 96.0 95.0   < >  --    MCHC g/dL 30.0* 30.7* 31.5   < >  --    PLATELETS 10*3/mm3 341 351 316   < >  --    INR   --   --   --   --  1.13*    < > = values in this interval not displayed.         Results from last 7 days   Lab Units 08/03/21  0035 08/02/21 0414 08/01/21  0926 07/31/21 0651 07/30/21  1523 07/29/21  1038 07/29/21  1038   SODIUM mmol/L 139 140 140   < > 134*   < > 137   POTASSIUM mmol/L 4.1 4.6 2.9*   < > 3.5   < > 3.5   MAGNESIUM mg/dL 1.8 2.1 1.4*   < >  --   --   --    CHLORIDE mmol/L 99 104 99   < > 88*   < > 88*   CO2 mmol/L 29.1* 25.3 28.2   < > 32.8*   < > 33.3*   BUN mg/dL 39* 43* 53*   < > 89*   < > 80*   CREATININE mg/dL 1.47* 1.44* 1.55*   < > 3.07*   < > 2.34*   EGFR IF NONAFRICN AM mL/min/1.73 35* 36* 33*   < > 15*   < > 20*   CALCIUM mg/dL 9.0 8.7 8.7   < > 9.2   < > 9.0   GLUCOSE mg/dL 112* 111* 143*   < > 129*   < > 110*   ALBUMIN g/dL  --   --   --   --  4.18  --  4.40   BILIRUBIN mg/dL  --   --   --   --  1.1  --  1.1   ALK PHOS U/L  --   --   --   --  111  --  99   AST (SGOT) U/L  --   --   --   --  15  --  16   ALT (SGPT) U/L  --   --   --   --  14  --  19    < > = values in this interval not displayed.   Estimated Creatinine Clearance: 16.5 mL/min (A) (by C-G formula based on SCr of 1.47 mg/dL (H)).    No results found for: AMMONIA      No results found for: BLOODCX  No results found for: URINECX  No results found for: WOUNDCX  No results found for: STOOLCX    I have personally looked at the labs and they are summarized above.  ----------------------------------------------------------------------------------------------------------------------  Imaging Results (Last 24 Hours)     ** No results found for the last 24 hours. **         ----------------------------------------------------------------------------------------------------------------------  Assessment and Plan:  -Paroxysmal atrial flutter with rapid ventricular response, no recurrence  -Acute kidney injury on CKD stage III stable  -Severe malnourishment  -Diastolic dysfunction compensated  -GERD  -Moderate pulmonary hypertension  -Mitral valve regurgitation moderate  -Acute on chronic normocytic anemia    We will proceed with heparin drip, monitor platelets and H&H, will change her back to her home Bumex dose of 1 mg twice a day, Lasix has been held, will monitor renal function. If she tolerates anticoagulation will discharge with direct oral anticoagulant, pharmacy has been consulted.    Plan discussed with patient and agrees as long as her family is agreeable.    Plan discussed with Hannah Franco RN    Total time spent on this encounter including discussion with family including risk and benefits with anticoagulation, patient resolved is more than 30 minutes.    Disposition Home in stable      Pastora Warren MD  08/04/21  12:06 EDT

## 2021-08-04 NOTE — PLAN OF CARE
Goal Outcome Evaluation:           Progress: improving  Outcome Summary: Patient resting in bed. Started on heparin drip. VSS. Patient has been up to BSC multiple times today. No acute changes. No complaints at this time.

## 2021-08-04 NOTE — CASE MANAGEMENT/SOCIAL WORK
Discharge Planning Assessment   [facility]     Patient Name: Ale Gupta  MRN: [id number]  Today's Date: 8/4/2021    Admit Date: 7/30/2021    Discharge Plan     Row Name 08/04/21 1625       Plan    Plan Pt was admitted on 7/30/21. Pt plans to return home with sister at discharge. Pt will need HH, prefers [facility]. Pt utilizes O2 @ 2L as needed. SS following.        JAVON Navarro

## 2021-08-04 NOTE — PLAN OF CARE
Goal Outcome Evaluation:           Progress: no change  Outcome Summary: pt resting in bed at this time. No complaints or acute changes. VSS. Will continue to monitor.

## 2021-08-05 LAB
ANION GAP SERPL CALCULATED.3IONS-SCNC: 9 MMOL/L (ref 5–15)
ANISOCYTOSIS BLD QL: NORMAL
APTT PPP: 37.4 SECONDS (ref 25.5–35.4)
APTT PPP: 40.2 SECONDS (ref 25.5–35.4)
APTT PPP: 61.9 SECONDS (ref 25.5–35.4)
APTT PPP: 65.9 SECONDS (ref 25.5–35.4)
BASOPHILS # BLD AUTO: 0.02 10*3/MM3 (ref 0–0.2)
BASOPHILS NFR BLD AUTO: 0.4 % (ref 0–1.5)
BUN SERPL-MCNC: 67 MG/DL (ref 8–23)
BUN/CREAT SERPL: 30.6 (ref 7–25)
CALCIUM SPEC-SCNC: 8.9 MG/DL (ref 8.6–10.5)
CHLORIDE SERPL-SCNC: 97 MMOL/L (ref 98–107)
CO2 SERPL-SCNC: 33 MMOL/L (ref 22–29)
CREAT SERPL-MCNC: 2.19 MG/DL (ref 0.57–1)
DACRYOCYTES BLD QL SMEAR: NORMAL
DEPRECATED RDW RBC AUTO: 71.6 FL (ref 37–54)
EOSINOPHIL # BLD AUTO: 0 10*3/MM3 (ref 0–0.4)
EOSINOPHIL NFR BLD AUTO: 0 % (ref 0.3–6.2)
ERYTHROCYTE [DISTWIDTH] IN BLOOD BY AUTOMATED COUNT: 21.2 % (ref 12.3–15.4)
GFR SERPL CREATININE-BSD FRML MDRD: 22 ML/MIN/1.73
GLUCOSE SERPL-MCNC: 110 MG/DL (ref 65–99)
HCT VFR BLD AUTO: 26.1 % (ref 34–46.6)
HGB BLD-MCNC: 8 G/DL (ref 12–15.9)
HYPOCHROMIA BLD QL: NORMAL
IMM GRANULOCYTES # BLD AUTO: 0.06 10*3/MM3 (ref 0–0.05)
IMM GRANULOCYTES NFR BLD AUTO: 1.1 % (ref 0–0.5)
LYMPHOCYTES # BLD AUTO: 0.98 10*3/MM3 (ref 0.7–3.1)
LYMPHOCYTES NFR BLD AUTO: 17.4 % (ref 19.6–45.3)
MACROCYTES BLD QL SMEAR: NORMAL
MCH RBC QN AUTO: 29.5 PG (ref 26.6–33)
MCHC RBC AUTO-ENTMCNC: 30.7 G/DL (ref 31.5–35.7)
MCV RBC AUTO: 96.3 FL (ref 79–97)
MONOCYTES # BLD AUTO: 0.49 10*3/MM3 (ref 0.1–0.9)
MONOCYTES NFR BLD AUTO: 8.7 % (ref 5–12)
NEUTROPHILS NFR BLD AUTO: 4.07 10*3/MM3 (ref 1.7–7)
NEUTROPHILS NFR BLD AUTO: 72.4 % (ref 42.7–76)
NRBC BLD AUTO-RTO: 0 /100 WBC (ref 0–0.2)
OVALOCYTES BLD QL SMEAR: NORMAL
PLAT MORPH BLD: NORMAL
PLATELET # BLD AUTO: 389 10*3/MM3 (ref 140–450)
PMV BLD AUTO: 11.9 FL (ref 6–12)
POTASSIUM SERPL-SCNC: 4.5 MMOL/L (ref 3.5–5.2)
RBC # BLD AUTO: 2.71 10*6/MM3 (ref 3.77–5.28)
SODIUM SERPL-SCNC: 139 MMOL/L (ref 136–145)
WBC # BLD AUTO: 5.62 10*3/MM3 (ref 3.4–10.8)

## 2021-08-05 PROCEDURE — 80048 BASIC METABOLIC PNL TOTAL CA: CPT | Performed by: HOSPITALIST

## 2021-08-05 PROCEDURE — 85730 THROMBOPLASTIN TIME PARTIAL: CPT | Performed by: HOSPITALIST

## 2021-08-05 PROCEDURE — 85730 THROMBOPLASTIN TIME PARTIAL: CPT | Performed by: INTERNAL MEDICINE

## 2021-08-05 PROCEDURE — 94799 UNLISTED PULMONARY SVC/PX: CPT

## 2021-08-05 PROCEDURE — 25010000002 HEPARIN (PORCINE) PER 1000 UNITS: Performed by: HOSPITALIST

## 2021-08-05 PROCEDURE — 85025 COMPLETE CBC W/AUTO DIFF WBC: CPT | Performed by: HOSPITALIST

## 2021-08-05 PROCEDURE — 97530 THERAPEUTIC ACTIVITIES: CPT | Performed by: OCCUPATIONAL THERAPIST

## 2021-08-05 PROCEDURE — 99232 SBSQ HOSP IP/OBS MODERATE 35: CPT | Performed by: HOSPITALIST

## 2021-08-05 PROCEDURE — 85007 BL SMEAR W/DIFF WBC COUNT: CPT | Performed by: HOSPITALIST

## 2021-08-05 RX ADMIN — HEPARIN SODIUM 1800 UNITS: 5000 INJECTION INTRAVENOUS; SUBCUTANEOUS at 10:19

## 2021-08-05 RX ADMIN — HEPARIN SODIUM 1800 UNITS: 5000 INJECTION INTRAVENOUS; SUBCUTANEOUS at 02:17

## 2021-08-05 RX ADMIN — MAGNESIUM GLUCONATE 500 MG ORAL TABLET 400 MG: 500 TABLET ORAL at 08:51

## 2021-08-05 RX ADMIN — FLUTICASONE PROPIONATE 2 SPRAY: 50 SPRAY, METERED NASAL at 08:51

## 2021-08-05 RX ADMIN — METOPROLOL SUCCINATE 100 MG: 50 TABLET, EXTENDED RELEASE ORAL at 08:51

## 2021-08-05 RX ADMIN — BUDESONIDE AND FORMOTEROL FUMARATE DIHYDRATE 2 PUFF: 160; 4.5 AEROSOL RESPIRATORY (INHALATION) at 07:58

## 2021-08-05 RX ADMIN — SODIUM CHLORIDE, PRESERVATIVE FREE 10 ML: 5 INJECTION INTRAVENOUS at 08:51

## 2021-08-05 RX ADMIN — PANTOPRAZOLE SODIUM 40 MG: 40 TABLET, DELAYED RELEASE ORAL at 05:46

## 2021-08-05 RX ADMIN — ALBUTEROL SULFATE 2.5 MG: 2.5 SOLUTION RESPIRATORY (INHALATION) at 22:41

## 2021-08-05 RX ADMIN — SODIUM CHLORIDE, PRESERVATIVE FREE 10 ML: 5 INJECTION INTRAVENOUS at 20:59

## 2021-08-05 NOTE — PLAN OF CARE
Goal Outcome Evaluation:           Progress: improving  Outcome Summary: Pt resting in bed this shift, up to BSC multiple times. Heparin drip infusing. VSS. No acute changes or complaints at this time. Will continue to monitor.

## 2021-08-05 NOTE — THERAPY TREATMENT NOTE
Acute Care - Occupational Therapy Treatment Note   [facility]     Patient Name: Ale Gupta  : 1945  MRN: [id number]  Today's Date: 2021  Onset of Illness/Injury or Date of Surgery: 21  Date of Referral to OT: 21  Referring Physician: Dr. Moss    Admit Date: 2021       ICD-10-CM ICD-9-CM   1. Acute renal failure, unspecified acute renal failure type (CMS/HCC)  N17.9 584.9   2. COPD mixed type (CMS/HCC)  J44.9 496     Patient Active Problem List   Diagnosis   • Closed bimalleolar fracture of right ankle   • Essential hypertension   • Chronic allergic rhinitis   • Gastroesophageal reflux disease without esophagitis   • Bimalleolar fracture, right, closed, with routine healing, subsequent encounter   • Closed fracture of left distal radius   • Essential tremor   • COPD mixed type (CMS/HCC)   • Acute kidney injury (CMS/HCC)   • Congestive heart failure (CMS/HCC)   • Acute renal failure (ARF) (CMS/HCC)     Past Medical History:   Diagnosis Date   • Allergy    • Arthritis    • Asthma    • Congestive heart failure (CHF) (CMS/HCC)    • COPD (chronic obstructive pulmonary disease) (CMS/HCC)    • GERD (gastroesophageal reflux disease)    • Hypertension    • Stroke (CMS/HCC)     TIA     Past Surgical History:   Procedure Laterality Date   • CATARACT EXTRACTION     • COLONOSCOPY     • ENDOSCOPY     • FRACTURE SURGERY      LEFT ARM    • HERNIA REPAIR     • ORIF ULNA/RADIUS FRACTURES Left 2019    Procedure: ULNA/RADIUS OPEN REDUCTION INTERNAL FIXATION, radius only;  Surgeon: Maverick tSevenson MD;  Location: [facility];  Service: Orthopedics            OT ASSESSMENT FLOWSHEET (last 12 hours)      OT Evaluation and Treatment     Row Name 21 0952                   OT Time and Intention    Subjective Information  no complaints  -BF        Document Type  therapy note (daily note)  -BF        Mode of Treatment  occupational therapy  -BF        Patient Effort  good  -BF        Symptoms Noted  During/After Treatment  none  -BF           General Information    Existing Precautions/Restrictions  fall;oxygen therapy device and L/min  -BF        Limitations/Impairments  hearing;safety/cognitive  -BF           Cognition    Orientation Status (Cognition)  oriented to;person;place  -BF        Follows Commands (Cognition)  follows one-step commands;verbal cues/prompting required;repetition of directions required  -BF           Motor Skills    Motor Control/Coordination Interventions  gross motor coordination activities;therapeutic exercise/ROM BUE GMC therex, light strengthening  -BF           Lower Body Dressing Assessment/Training    Norman Level (Lower Body Dressing)  contact guard assist;verbal cues;nonverbal cues (demo/gesture)  -BF        Comment (Lower Body Dressing)  CGA  -BF           Positioning and Restraints    In Bed  supine;call light within reach;encouraged to call for assist  -BF          User Key  (r) = Recorded By, (t) = Taken By, (c) = Cosigned By    Initials Name Effective Dates    Rachel Taylor OT 06/16/21 -                  OT Recommendation and Plan  Planned Therapy Interventions (OT): activity tolerance training, BADL retraining, ROM/therapeutic exercise, strengthening exercise, transfer/mobility retraining           Time Calculation:   Time Calculation- OT     Row Name 08/05/21 0953             Time Calculation- OT    Total Timed Code Minutes- OT  25 minute(s)  -BF        User Key  (r) = Recorded By, (t) = Taken By, (c) = Cosigned By    Initials Name Provider Type    BF Rachel Longo OT Occupational Therapist        Therapy Charges for Today     Code Description Service Date Service Provider Modifiers Qty    [id number]  OT THERAPEUTIC ACT EA 15 MIN 8/5/2021 Rachel Longo OT GO 2               Rachel Longo OT  8/5/2021

## 2021-08-05 NOTE — PLAN OF CARE
Goal Outcome Evaluation:        Patient resting in bed.  Heparin drip infusing at 24 units/kg/hr. Awaiting redraw for ptt at 1620.  No other issues noted at this time.

## 2021-08-05 NOTE — PROGRESS NOTES
[facility]     Patient Identification:  Name:  Ale Gupta  Age:  75 y.o.  Sex:  female  :  1945  MRN:  [id number]  Visit Number:  [id number]  Primary Care Provider:  Neema Durbin APRN    Length of stay:  6    Subjective: Patient seen and examined assisted by Samaria Shepherd RN.  Patient is creatinine has increased after restarting her Bumex, so far H&H has remained stable on heparin drip, no report of melena.    Chief Complaint: Acute kidney injury  ----------------------------------------------------------------------------------------------------------------------  Current Hospital Meds:  budesonide-formoterol, 2 puff, Inhalation, Daily  fluticasone, 2 spray, Nasal, Daily  magnesium oxide, 400 mg, Oral, Daily  metoprolol succinate XL, 100 mg, Oral, Q24H  pantoprazole, 40 mg, Oral, Q AM  sodium chloride, 10 mL, Intravenous, Q12H      heparin (porcine), 12 Units/kg/hr, Last Rate: 20 Units/kg/hr (21)  Pharmacy Consult,       ----------------------------------------------------------------------------------------------------------------------  Vital Signs:  Temp:  [97.3 °F (36.3 °C)-98.6 °F (37 °C)] 98.5 °F (36.9 °C)  Heart Rate:  [87-95] 95  Resp:  [16-18] 18  BP: (128-142)/(50-63) 135/63       Tele: Normal sinus rhythm 98 bpm      21  1435 21   Weight: 34 kg (75 lb) 30.7 kg (67 lb 9.6 oz)     Body mass index is 20.64 kg/m².    Intake/Output Summary (Last 24 hours) at 2021 0943  Last data filed at 2021 0300  Gross per 24 hour   Intake 973.56 ml   Output 25 ml   Net 948.56 ml     Diet Regular  ----------------------------------------------------------------------------------------------------------------------  Physical exam:  General: Chronically ill-appearing, malnourished, comfortable,awake, alert, oriented to self, place, and time,   No respiratory distress.    Skin:  Skin is warm and dry. No rash noted. No pallor.     No petechial rash  HENT:  Head:  Normocephalic and atraumatic.  Mouth:  Moist mucous membranes.    Edentulous  Eyes:  Conjunctivae and EOM are normal.  Pupils are equal, round, and reactive to light.  No scleral icterus.    Neck:  Neck supple.  No JVD present.    Pulmonary/Chest:  No respiratory distress, no wheezes, no crackles, with normal breath sounds and good air movement.  Cardiovascular:  Normal rate, regular rhythm and normal heart sounds with no murmur.  Abdominal:  Soft.  Bowel sounds are normal.  No distension and no tenderness.   Extremities:  No edema, no tenderness, and no deformity.  No red or swollen joints anywhere.  Strong pulses in all 4 extremities with no clubbing, no cyanosis, no edema.  Neurological:  Motor strength equal no obvious deficit, sensory grossly intact.   No cranial nerve deficit.  No tongue deviation.  No facial droop.  No slurred speech.    Genitourinary: No [facility]  Back:  ----------------------------------------------------------------------------------------------------------------------  ----------------------------------------------------------------------------------------------------------------------  Results from last 7 days   Lab Units 07/30/21  1523   CK TOTAL U/L 16*     Results from last 7 days   Lab Units 08/05/21  0129 08/04/21  1252 08/04/21  0146 07/31/21  0651 07/30/21  1545   WBC 10*3/mm3 5.62 4.74 3.85   < >  --    HEMOGLOBIN g/dL 8.0* 8.3* 7.3*   < >  --    HEMATOCRIT % 26.1* 27.5* 24.3*   < >  --    MCV fL 96.3 100.0* 97.2*   < >  --    MCHC g/dL 30.7* 30.2* 30.0*   < >  --    PLATELETS 10*3/mm3 389 356 341   < >  --    INR   --  1.06  --   --  1.13*    < > = values in this interval not displayed.         Results from last 7 days   Lab Units 08/05/21  0129 08/03/21  0035 08/02/21  0414 08/01/21  0926 08/01/21  0926 07/31/21  0651 07/30/21  1523 07/29/21  1038 07/29/21  1038   SODIUM mmol/L 139 139 140   < > 140   < > 134*   < > 137   POTASSIUM mmol/L 4.5  4.1 4.6   < > 2.9*   < > 3.5   < > 3.5   MAGNESIUM mg/dL  --  1.8 2.1  --  1.4*   < >  --   --   --    CHLORIDE mmol/L 97* 99 104   < > 99   < > 88*   < > 88*   CO2 mmol/L 33.0* 29.1* 25.3   < > 28.2   < > 32.8*   < > 33.3*   BUN mg/dL 67* 39* 43*   < > 53*   < > 89*   < > 80*   CREATININE mg/dL 2.19* 1.47* 1.44*   < > 1.55*   < > 3.07*   < > 2.34*   EGFR IF NONAFRICN AM mL/min/1.73 22* 35* 36*   < > 33*   < > 15*   < > 20*   CALCIUM mg/dL 8.9 9.0 8.7   < > 8.7   < > 9.2   < > 9.0   GLUCOSE mg/dL 110* 112* 111*   < > 143*   < > 129*   < > 110*   ALBUMIN g/dL  --   --   --   --   --   --  4.18  --  4.40   BILIRUBIN mg/dL  --   --   --   --   --   --  1.1  --  1.1   ALK PHOS U/L  --   --   --   --   --   --  111  --  99   AST (SGOT) U/L  --   --   --   --   --   --  15  --  16   ALT (SGPT) U/L  --   --   --   --   --   --  14  --  19    < > = values in this interval not displayed.   Estimated Creatinine Clearance: 11.1 mL/min (A) (by C-G formula based on SCr of 2.19 mg/dL (H)).    No results found for: AMMONIA      No results found for: BLOODCX  No results found for: URINECX  No results found for: WOUNDCX  No results found for: STOOLCX    I have personally looked at the labs and they are summarized above.  ----------------------------------------------------------------------------------------------------------------------  Imaging Results (Last 24 Hours)     ** No results found for the last 24 hours. **        ----------------------------------------------------------------------------------------------------------------------  Assessment and Plan:  -Paroxysmal a flutter with RVR  -Acute on chronic normocytic anemia status post 1 unit packed RBC   -Debility   -Severe malnourishment   -Acute kidney injury on CKD stage III likely prerenal   -Essential hypertension   -Moderate pulmonary pretension  -Moderate mitral valve regurgitation  -Diastolic dysfunction compensated    Noted renal function worsened when her  diuretics was restarted despite discontinuing other diuretics including Lasix.  For now we will discontinue her home Bumex, and will reconsider the dosing and frequency once renal failure improved in the meantime we will hold off discharge transition her to oral anticoagulant when pharmacy gives information regarding cost of direct oral anticoagulant.      Disposition Home when stable      Pastora Warren MD  08/05/21  09:46 EDT

## 2021-08-06 ENCOUNTER — READMISSION MANAGEMENT (OUTPATIENT)
Dept: CALL CENTER | Facility: HOSPITAL | Age: 76
End: 2021-08-06

## 2021-08-06 VITALS
WEIGHT: 67.6 LBS | TEMPERATURE: 97.9 F | HEART RATE: 91 BPM | DIASTOLIC BLOOD PRESSURE: 50 MMHG | BODY MASS INDEX: 15.64 KG/M2 | OXYGEN SATURATION: 98 % | SYSTOLIC BLOOD PRESSURE: 117 MMHG | RESPIRATION RATE: 20 BRPM | HEIGHT: 55 IN

## 2021-08-06 LAB
ANION GAP SERPL CALCULATED.3IONS-SCNC: 6.2 MMOL/L (ref 5–15)
ANISOCYTOSIS BLD QL: ABNORMAL
APTT PPP: 63.2 SECONDS (ref 25.5–35.4)
BASOPHILS # BLD MANUAL: 0.08 10*3/MM3 (ref 0–0.2)
BASOPHILS NFR BLD AUTO: 2 % (ref 0–1.5)
BUN SERPL-MCNC: 54 MG/DL (ref 8–23)
BUN/CREAT SERPL: 36.7 (ref 7–25)
CALCIUM SPEC-SCNC: 8.8 MG/DL (ref 8.6–10.5)
CHLORIDE SERPL-SCNC: 100 MMOL/L (ref 98–107)
CO2 SERPL-SCNC: 33.8 MMOL/L (ref 22–29)
CREAT SERPL-MCNC: 1.47 MG/DL (ref 0.57–1)
DACRYOCYTES BLD QL SMEAR: ABNORMAL
DEPRECATED RDW RBC AUTO: 73 FL (ref 37–54)
ERYTHROCYTE [DISTWIDTH] IN BLOOD BY AUTOMATED COUNT: 21.6 % (ref 12.3–15.4)
GFR SERPL CREATININE-BSD FRML MDRD: 35 ML/MIN/1.73
GLUCOSE SERPL-MCNC: 103 MG/DL (ref 65–99)
HCT VFR BLD AUTO: 23.5 % (ref 34–46.6)
HGB BLD-MCNC: 7.1 G/DL (ref 12–15.9)
LYMPHOCYTES # BLD MANUAL: 0.76 10*3/MM3 (ref 0.7–3.1)
LYMPHOCYTES NFR BLD MANUAL: 20 % (ref 19.6–45.3)
LYMPHOCYTES NFR BLD MANUAL: 7 % (ref 5–12)
MACROCYTES BLD QL SMEAR: ABNORMAL
MCH RBC QN AUTO: 29.3 PG (ref 26.6–33)
MCHC RBC AUTO-ENTMCNC: 30.2 G/DL (ref 31.5–35.7)
MCV RBC AUTO: 97.1 FL (ref 79–97)
MICROCYTES BLD QL: ABNORMAL
MONOCYTES # BLD AUTO: 0.27 10*3/MM3 (ref 0.1–0.9)
NEUTROPHILS # BLD AUTO: 2.7 10*3/MM3 (ref 1.7–7)
NEUTROPHILS NFR BLD MANUAL: 60 % (ref 42.7–76)
NEUTS BAND NFR BLD MANUAL: 11 % (ref 0–5)
OVALOCYTES BLD QL SMEAR: ABNORMAL
PLAT MORPH BLD: NORMAL
PLATELET # BLD AUTO: 345 10*3/MM3 (ref 140–450)
PMV BLD AUTO: 11.5 FL (ref 6–12)
POTASSIUM SERPL-SCNC: 4.1 MMOL/L (ref 3.5–5.2)
RBC # BLD AUTO: 2.42 10*6/MM3 (ref 3.77–5.28)
SODIUM SERPL-SCNC: 140 MMOL/L (ref 136–145)
WBC # BLD AUTO: 3.8 10*3/MM3 (ref 3.4–10.8)

## 2021-08-06 PROCEDURE — 94799 UNLISTED PULMONARY SVC/PX: CPT

## 2021-08-06 PROCEDURE — 99239 HOSP IP/OBS DSCHRG MGMT >30: CPT | Performed by: HOSPITALIST

## 2021-08-06 PROCEDURE — 85025 COMPLETE CBC W/AUTO DIFF WBC: CPT | Performed by: HOSPITALIST

## 2021-08-06 PROCEDURE — 85730 THROMBOPLASTIN TIME PARTIAL: CPT | Performed by: HOSPITALIST

## 2021-08-06 PROCEDURE — 85007 BL SMEAR W/DIFF WBC COUNT: CPT | Performed by: HOSPITALIST

## 2021-08-06 PROCEDURE — 25010000002 HEPARIN (PORCINE) PER 1000 UNITS: Performed by: HOSPITALIST

## 2021-08-06 PROCEDURE — 80048 BASIC METABOLIC PNL TOTAL CA: CPT | Performed by: HOSPITALIST

## 2021-08-06 RX ORDER — METOPROLOL SUCCINATE 100 MG/1
100 TABLET, EXTENDED RELEASE ORAL
Qty: 30 TABLET | Refills: 3 | Status: SHIPPED | OUTPATIENT
Start: 2021-08-07

## 2021-08-06 RX ORDER — BUMETANIDE 1 MG/1
0.5 TABLET ORAL DAILY
Qty: 15 TABLET | Refills: 0 | Status: SHIPPED | OUTPATIENT
Start: 2021-08-06 | End: 2021-09-05

## 2021-08-06 RX ADMIN — BUDESONIDE AND FORMOTEROL FUMARATE DIHYDRATE 2 PUFF: 160; 4.5 AEROSOL RESPIRATORY (INHALATION) at 06:37

## 2021-08-06 RX ADMIN — HEPARIN SODIUM 24 UNITS/KG/HR: 10000 INJECTION, SOLUTION INTRAVENOUS at 05:51

## 2021-08-06 RX ADMIN — FLUTICASONE PROPIONATE 2 SPRAY: 50 SPRAY, METERED NASAL at 08:23

## 2021-08-06 RX ADMIN — SODIUM CHLORIDE, PRESERVATIVE FREE 10 ML: 5 INJECTION INTRAVENOUS at 08:24

## 2021-08-06 RX ADMIN — PANTOPRAZOLE SODIUM 40 MG: 40 TABLET, DELAYED RELEASE ORAL at 05:52

## 2021-08-06 RX ADMIN — MAGNESIUM GLUCONATE 500 MG ORAL TABLET 400 MG: 500 TABLET ORAL at 08:24

## 2021-08-06 NOTE — PLAN OF CARE
Goal Outcome Evaluation:  Plan of Care Reviewed With: patient        Progress: improving  Outcome Summary: Pt has rested in bed throughout shift. No complaints. VSS. No acute changes noted.

## 2021-08-06 NOTE — DISCHARGE SUMMARY
[facility] HOSPITALIST MEDICINE DISCHARGE SUMMARY    Patient Identification:  Name:  Ale Gupta  Age:  75 y.o.  Sex:  female  :  1945  MRN:  [id number]  Visit Number:  [id number]    Date of Admission: 2021  Date of Discharge:  2021  DISCHARGE DISPOSITION   Stable  PCP: Neema Durbin APRN    DISCHARGE DIAGNOSIS : Acute kidney injury on CKD stage III prerenal, moderate pulmonary hypertension, chronic diastolic heart failure with no decompensation at this time, COPD without exacerbation, essential hypertension, acute worsening of chronic anemia requiring 1 unit packed RBC transfusion, no obvious bleed. , GERD.  On O2 supplementation as needed at home.  Moderate mitral valve regurgitation.       HOSPITAL COURSE  Patient is a 75 y.o. female presented to [facility] after referral by her primary care provider because of worsening of renal function.  Further inquiry from family patient was recently discharged from [facility] for CHF, he was on Lasix 40 mg daily and Bumex 1 mg twice a day this was held, she appears very hypovolemic and dehydrated.  After this renal function improved back to baseline.  Her hemoglobin hematocrit has decreased requiring 1 unit packed RBC.  As per family she has a follow-up appointment apparently with gastroenterologist for anemia.  She was supposed to be discharged on , but noted on telemetry she has episodes of A. fib with RVR, review of telemetry revealed she has at least one other episode of a flutter with RVR that was nonsustained.  Family noted patient is aware she has cardiac arrhythmia.  Discussion with family and patient regarding anticoagulation, and and concerns possibility of bleed especially with history of anemia and incomplete work-up including GI.  After consideration and the risk and benefits, family would prefer trial of anticoagulation.  She was placed on heparin drip and so far has tolerated, she had bowel  movement with no melena, her H&H has not dropped significantly.  Unfortunately after restarting back her diuretics she again developed acute kidney injury.  With this we held her Bumex, with a plan of starting her at a lower dose on discharge.  She will have an early appointment with her primary provider to check for her BMP and electrolytes as well as CBC.  I was able to call Julia Andrews patient's sister that is living with her and facilitating her appointments, to keep all appointments including GI and her family doctor.  Also was advised to stop her anticoagulation should she develop hematuria melena hematemesis and call her doctor immediately.  Please note that after restarting her Toprol, there was no recurrence of paroxysmal a flutter or A. Fib.  During her stay physical therapy was involved, she is doing very well she ambulates very self with minimal assistance, we will continue with home health and physical therapy for supervision medication changes, safety evaluation, transfer and gait exercises.    VITAL SIGNS:      07/30/21  1435 07/30/21  1945   Weight: 34 kg (75 lb) 30.7 kg (67 lb 9.6 oz)     Body mass index is 20.64 kg/m².  Vitals:    08/06/21 0823   BP: 108/47   Pulse:    Resp:    Temp:    SpO2:      PHYSICAL EXAM:        During my exam, Darling Shepherd RN was present to assist.    General: Comfortable,awake, alert, oriented to self, place, and time, well-developed, chronically ill-appearing very pleasant, malnourished,   No respiratory distress.    Skin:  Skin is warm and dry. No rash noted. No pallor.    HENT:  Head:  Normocephalic and atraumatic.  Mouth:  Moist mucous membranes.    Eyes:  Conjunctivae and EOM are normal.  Pupils are equal, round, and reactive to light.  No scleral icterus.    Neck:  Neck supple.  No JVD present.   No bruit  Pulmonary/Chest:  No respiratory distress, no wheezes, no crackles, with normal breath sounds and good air movement.  Cardiovascular:  Normal rate, regular  rhythm and normal heart sounds with no murmur.  Abdominal:  Soft.  Bowel sounds are normal.  No distension and no tenderness.   Extremities:  No edema, no tenderness, and no deformity.  No red or swollen joints anywhere.  Strong pulses in all 4 extremities with no clubbing, no cyanosis, no edema.  Neurological:  Motor strength equal no obvious deficit, sensory grossly intact.   No cranial nerve deficit.  No tongue deviation.  No facial droop.  No slurred speech.    Genitourinary: No Rutledge catheter  ----------    DISCHARGE MEDICATIONS:     Discharge Medications      New Medications      Instructions Start Date   apixaban 2.5 MG tablet tablet  Commonly known as: ELIQUIS   2.5 mg, Oral, Every 12 Hours Scheduled         Changes to Medications      Instructions Start Date   bumetanide 1 MG tablet  Commonly known as: BUMEX  What changed:   · how much to take  · when to take this   0.5 mg, Oral, Daily      metoprolol succinate  MG 24 hr tablet  Commonly known as: TOPROL-XL  What changed: when to take this   100 mg, Oral, Daily      metoprolol succinate  MG 24 hr tablet  Commonly known as: TOPROL-XL  What changed: You were already taking a medication with the same name, and this prescription was added. Make sure you understand how and when to take each.   100 mg, Oral, Every 24 Hours Scheduled   Start Date: August 7, 2021        Continue These Medications      Instructions Start Date   albuterol (2.5 MG/3ML) 0.083% nebulizer solution  Commonly known as: PROVENTIL   2.5 mg, Nebulization, Every 4 Hours PRN      budesonide-formoterol 160-4.5 MCG/ACT inhaler  Commonly known as: SYMBICORT   2 puffs, Inhalation, Daily      docusate sodium 100 MG capsule  Commonly known as: COLACE   100 mg, Oral, 2 Times Daily PRN      fluticasone 50 MCG/ACT nasal spray  Commonly known as: FLONASE   2 sprays, Nasal, Daily      omeprazole 20 MG capsule  Commonly known as: priLOSEC   20 mg, Oral, Daily      potassium chloride 10 MEQ CR  tablet  Commonly known as: K-DUR,KLOR-CON   10 mEq, Oral, Daily         Stop These Medications    amLODIPine 10 MG tablet  Commonly known as: NORVASC     furosemide 40 MG tablet  Commonly known as: LASIX     hydrALAZINE 25 MG tablet  Commonly known as: APRESOLINE              Your Scheduled Appointments    Aug 10, 2021 10:30 AM  Hospital Follow Up with [facility]  [address] [address] [phone]             Additional Instructions for the Follow-ups that You Need to Schedule     Ambulatory Referral to Home Health   As directed      Face to Face Visit Date: 8/2/2021    Follow-up provider for Plan of Care?: I treated the patient in an acute care facility and will not continue treatment after discharge.    Follow-up provider: INGRID STOVER [[id number]]    Reason/Clinical Findings: Debilitated, COPD, pulmonaryhypertension, changes on medication requiring supervision.    Describe mobility limitations that make leaving home difficult: Debilitated, COPD, chronic hypoxic respiratory failure, pulmonary hypertension,    Nursing/Therapeutic Services Requested: Skilled Nursing    Skilled nursing orders: Medication education O2 instruction COPD management    Frequency: 1 Week 1         Ambulatory Referral to Home Health   As directed      Face to Face Visit Date: 8/6/2021    Follow-up provider for Plan of Care?: I treated the patient in an acute care facility and will not continue treatment after discharge.    Follow-up provider: INGRID STOVER [[id number]]    Reason/Clinical Findings: Frequent falls malnourished debility, COPD, CHF paroxysmal A. fib needs follow-up medication supervision physical therapy    Describe mobility limitations that make leaving home difficult: Patient is malnourished debilitated with CHF and COPD, requires help to leave the house.    Nursing/Therapeutic Services Requested: Skilled Nursing Physical Therapy Medical /  Social Work    Skilled nursing orders: Medication education COPD management    PT orders: Strengthening Home safety assessment Transfer training Gait Training    Weight Bearing Status: As Tolerated    Frequency: 1 Week 1         Discharge Follow-up with PCP   As directed       Currently Documented PCP:    Neema Durbin APRN    PCP Phone Number:    [phone]     Follow Up Details: [facility]         Discharge Follow-up with PCP   As directed       Currently Documented PCP:    Neema Durbin APRN    PCP Phone Number:    [phone]     Follow Up Details: Neema Durbin APRN (8-9-2021) for BMP and CBC check         Discharge Follow-up with Specified Provider: GI Keep appointment for previously scheduled  appointment   As directed      To: GI Keep appointment for previously scheduled  appointment           Follow-up Information     Neema Durbin APRN .    Specialty: Family Medicine  Why: [facility]  Contact information:  [address] [facility] [address] [phone]             Neema Durbin APRN .    Specialty: Family Medicine  Why: Neema Durbin APRN (8-9-2021) for BMP and CBC check  Contact information:  [address] [facility] [address] [phone]                   Pastora Warren MD  08/06/21  08:52 EDT    Please note that this discharge summary required more than 30 minutes to complete.

## 2021-08-06 NOTE — PHARMACY PATIENT ASSISTANCE
Pharmacy checked on cost of new med Eliquis. It has a $9.20 copay per her pharmacy.    Madalyn Ordonez, AndreeD

## 2021-08-06 NOTE — DISCHARGE INSTR - APPOINTMENTS
[facility] has been arranged for home services.  You can reach the office at .     Recommend follow up Labs (BMP and CBC check) on August 9, 2021.

## 2021-08-06 NOTE — OUTREACH NOTE
Prep Survey      Responses   [facility] facility patient discharged from?  [facility]   Is LACE score < 7 ?  No   Emergency Room discharge w/ pulse ox?  No   Eligibility  [facility]   Hospital  [facility]   Date of Admission  07/30/21   Date of Discharge  08/06/21   Discharge Disposition  Home-Health Care Svc   Discharge diagnosis  KENNEDY on CKD, worsening chronic anemia, Hx CHF, COPD   Does the patient have one of the following disease processes/diagnoses(primary or secondary)?  Other   Does the patient have Home health ordered?  Yes   What is the Home health agency?   [facility]    Is there a DME ordered?  No   Prep survey completed?  Yes          Desiree Andrews RN

## 2021-08-06 NOTE — CASE MANAGEMENT/SOCIAL WORK
Discharge Planning Assessment  [facility]     Patient Name: Ale Gupta  MRN: [id number]  Today's Date: 8/6/2021    Admit Date: 7/30/2021      Discharge Plan     Row Name 08/06/21 1040       Plan    Final Note Pt is being discharged home today. SS arranged [facility] on 8/2/21. SS notified [facility] per Swati. RN called report to [facility]. No other needs identified.    Row Name 08/06/21 1033       Plan    Final Discharge Disposition Code  06 - home with home health care        Continued Care and Services - Admitted Since 7/30/2021     Home Medical Care Coordination complete.    Service Provider Request Status Selected Services Address Phone Fax Patient Preferred    [facility] DEPT HOME HEALTH   Selected Home Health Services [address] [place] [address] [facility] [address] [phone] --              JAVON Grant

## 2021-08-06 NOTE — DISCHARGE PLACEMENT REQUEST
"Ale Gupta (75 y.o. Female)     Date of Birth Social Security Number Address Home Phone MRN    1945  [address]  [place] [id number] [phone] [id number]    Congregation Marital Status          Latter day Single       Admission Date Admission Type Admitting Provider Attending Provider Department, Room/Bed    21 Emergency Félix Moss MD Oculam, Claire Chin, MD [address], 3349/2S    Discharge Date Discharge Disposition Discharge Destination         [place] [facility] [place]ending Provider: Pastora Warren MD    Allergies: Codeine    Isolation: None   Infection: None   Code Status: CPR    Ht: 121.9 cm (47.99\")   Wt: 30.7 kg (67 lb 9.6 oz)    Admission Cmt: None   Principal Problem: None                Active Insurance as of 2021     Primary Coverage     Payor Plan Insurance Group Employer/Plan Group    MEDICARE MEDICARE A & B      Payor Plan Address Payor Plan Phone Number Payor Plan Fax Number Effective Dates    [address] [phone]  1974 - None Entered    [place] [address]       Subscriber Name Subscriber Birth Date Member ID       ALE GUPTA 1945 [id number]                 Emergency Contacts      (Rel.) Home Phone Work Phone Mobile Phone    Lea Andrews (Sister) [phone] -- --    EL ANDREWS (Sister) [phone] -- --    Marcie Guptae (Brother) [phone] -- --               Discharge Summary      Pastora Warern MD at [address] [place] [address] [facility]    Patient Identification:  Name:  Ale Gupta  Age:  75 y.o.  Sex:  female  :  1945  MRN:  [id number]  Visit Number:  [id number]    Date of Admission: 2021  Date of Discharge: 2021  DISCHARGE DISPOSITION   Stable  PCP: Neema Durbin APRN    DISCHARGE DIAGNOSIS : Acute kidney injury on CKD stage III, moderate, hypertension, chronic diastolic heart " failure with no decompensation at this time, COPD without exacerbation, essential hypertension, anemia of chronic disease, GERD.  On O2 supplementation as needed at home.  Moderate mitral valve regurgitation.    HOSPITAL COURSE  Patient is a 75 y.o. female presented to [facility] after referral by hers primary care provider because of worsening of renal function.  She was admitted previously at [facility] in [place] for similar presentation of renal failure, on admission she appears dehydrated, diuretics were held, she was given IV fluids cautiously and tolerated well with no decompensation of heart failure.  Part of the work-up includes CT scan of the abdomen pelvis revealing no obstruction no hydronephrosis.  X-ray of chest is unremarkable except for old left rib fracture.  2D echo shows normal ejection fraction, moderate pulmonary pretension, moderate mitral valve regurgitation.  After hydration patient's renal function is back to baseline, plan is to discharge her home today with home health for supervision of changes in her medication, she also uses oxygen at home 2 L as needed as per sister.  She will follow-up with her primary provider scheduled.  Regards to diuretics we discontinued Lasix, we continue Bumex.  Primary care provider will be checking her electrolytes and renal function post discharge.  Prior to discharge her O2 saturation on ambulation is above 94% room air persistently.    VITAL SIGNS:      07/30/21  1435 07/30/21  1945   Weight: 34 kg (75 lb) 30.7 kg (67 lb 9.6 oz)     Body mass index is 20.64 kg/m².  Vitals:    08/02/21 1154   BP:    Pulse:    Resp:    Temp:    SpO2: 91%     PHYSICAL EXAM:    My exam was done in the presence of Lu Vargas RN inside the room assisting my exam.    General: Chronically ill-appearing, comfortable,awake, alert, oriented to self, place, and time,  No respiratory distress.    Skin:  Skin is warm and dry. No rash noted. No pallor.    HENT:   Head:  Normocephalic and atraumatic.  Mouth:  Moist mucous membranes.    She is edentulous and uses dentures.  Eyes:  Conjunctivae and EOM are normal.  Pupils are equal, round, and reactive to light.  No scleral icterus.    Neck:  Neck supple.  She has JVD, no bruit,   Pulmonary/Chest:  No respiratory distress, no wheezes, no crackles, with normal breath sounds and good air movement.  Cardiovascular: Very distant heart sound I could not hear any obvious murmur or gallop no rub.    Abdominal:  Soft.  Bowel sounds are normal.  No distension and no tenderness.   Extremities:  No edema, no tenderness, and no deformity.  No red or swollen joints anywhere.  Strong pulses in all 4 extremities with no clubbing, no cyanosis,   Neurological:  Motor strength equal no obvious deficit, sensory grossly intact.   No cranial nerve deficit.  No tongue deviation.  No facial droop.  No slurred speech.    Genitourinary: No Rutledge catheter  Back:  ----------    DISCHARGE MEDICATIONS:     Discharge Medications      Changes to Medications      Instructions Start Date   metoprolol succinate  MG 24 hr tablet  Commonly known as: TOPROL-XL  What changed: when to take this   100 mg, Oral, Daily         Continue These Medications      Instructions Start Date   albuterol (2.5 MG/3ML) 0.083% nebulizer solution  Commonly known as: PROVENTIL   2.5 mg, Nebulization, Every 4 Hours PRN      budesonide-formoterol 160-4.5 MCG/ACT inhaler  Commonly known as: SYMBICORT   2 puffs, Inhalation, Daily      bumetanide 1 MG tablet  Commonly known as: BUMEX   1 mg, Oral, 2 Times Daily      docusate sodium 100 MG capsule  Commonly known as: COLACE   100 mg, Oral, 2 Times Daily PRN      fluticasone 50 MCG/ACT nasal spray  Commonly known as: FLONASE   2 sprays, Nasal, Daily      omeprazole 20 MG capsule  Commonly known as: priLOSEC   20 mg, Oral, Daily      potassium chloride 10 MEQ CR tablet  Commonly known as: K-DUR,KLOR-CON   10 mEq, Oral, Daily          Stop These Medications    amLODIPine 10 MG tablet  Commonly known as: NORVASC     furosemide 40 MG tablet  Commonly known as: LASIX     hydrALAZINE 25 MG tablet  Commonly known as: APRESOLINE              Your Scheduled Appointments    Aug 10, 2021 10:30 AM  Hospital Follow Up with REBEKAH Peacock  [facility]) [address] [place] [id number] [phone]             Additional Instructions for the Follow-ups that You Need to Schedule     Ambulatory Referral to Home Health   As directed      Face to Face Visit Date: 8/2/2021    Follow-up provider for Plan of Care?: I treated the patient in an acute care facility and will not continue treatment after discharge.    Follow-up provider: INGRID DURBIN [[id number]]    Reason/Clinical Findings: Debilitated, COPD, pulmonaryhypertension, changes on medication requiring supervision.    Describe mobility limitations that make leaving home difficult: Debilitated, COPD, chronic hypoxic respiratory failure, pulmonary hypertension,    Nursing/Therapeutic Services Requested: Skilled Nursing    Skilled nursing orders: Medication education O2 instruction COPD management    Frequency: 1 Week 1         Discharge Follow-up with PCP   As directed       Currently Documented PCP:    Ingrid Durbin APRN    PCP Phone Number:    [phone]     Follow Up Details: REBEKAH Peacock           Follow-up Information     Ingrid Durbin APRN .    Specialty: Family Medicine  Why: REBEKAH Peacock  Contact information:  [address] [place] [address] [id number] [phone]                   Pastora Warren MD  08/02/21  12:54 EDT    Please note that this discharge summary required more than 30 minutes to complete.      Electronically signed by Pastora Warren MD at 08/02/21 1407       Discharge Order (From admission, onward)     Start     Ordered    08/06/21 0834  Discharge patient  Once     Expected Discharge Date: 08/06/21     [place] [facility] [place]    Physician of Record for Attribution - Please select from Treatment Team: SILVIA SELLERS [[id number]]    Review needed by CMO to determine Physician of Record: No       Question Answer Comment   Physician of Record for Attribution - Please select from Treatment Team SILVIA SELLERS    Review needed by CMO to determine Physician of Record No        08/06/21 0851    08/02/21 1237  Discharge patient  Once,   Status:  Canceled     Expected Discharge Date: 08/02/21    Discharge Dis[place] [facility] [place]    Physician of Record for Attribution - Please select from Treatment Team: EDELMIRA JIMENEZ [[id number]]    Review needed by CMO to determine Physician of Record: No       Question Answer Comment   Physician of Record for Attribution - Please select from Treatment Team EDELMIRA JIMENEZ    Review needed by CMO to determine Physician of Record No        08/02/21 1246

## 2021-08-09 ENCOUNTER — TRANSITIONAL CARE MANAGEMENT TELEPHONE ENCOUNTER (OUTPATIENT)
Dept: CALL CENTER | Facility: HOSPITAL | Age: 76
End: 2021-08-09

## 2021-08-09 NOTE — OUTREACH NOTE
Call Center TCM Note      Responses   [facility] patient discharged from?  [facility]   Does the patient have one of the following disease processes/diagnoses(primary or secondary)?  Other   TCM attempt successful?  Yes   Call start time  1415   Call end time  1423   Discharge diagnosis  KENNEDY on CKD, worsening chronic anemia, Hx CHF, COPD   Person spoke with today (if not patient) and relationship  sister Dumont   Medication alerts for this patient  Eliquis---bleeding precautions discussed   Meds reviewed with patient/caregiver?  Yes   Is the patient having any side effects they believe may be caused by any medication additions or changes?  No   Does the patient have all medications ordered at discharge?  Yes   Is the patient taking all medications as directed (includes completed medication regime)?  Yes   Comments regarding appointments  Cardiology on 8/13/21   Does the patient have a primary care provider?   Yes   Does the patient have an appointment with their PCP within 7 days of discharge?  Yes   Comments regarding PCP  Hospital D/C follow-up scheduled for 8/10/21@1030am        Has the patient kept scheduled appointments due by today?  N/A   What is the Home health agency?   [facility] --PT, OT   Has home health visited the patient within 72 hours of discharge?  Yes   DME comments  o2 at 2lpm per nc prn   Psychosocial issues?  No   Psychosocial comments  Lives with sister   Did the patient receive a copy of their discharge instructions?  Yes   Nursing interventions  Reviewed instructions with patient   What is the patient's perception of their health status since discharge?  Returned to baseline/stable [Sister reports normal degree of SOA, no swelling noted, no scale to weigh (discusseed non-scale means to monitor for fluid weight gain-denies at time of call).  Denies any s/s of bleeding and assoc s/s --will monitor.]   Is the patient/caregiver able to teach back signs and symptoms related to disease process  for when to call PCP?  Yes   Is the patient/caregiver able to teach back signs and symptoms related to disease process for when to call 911?  Yes   Is the patient/caregiver able to teach back the hierarchy of who to call/visit for symptoms/problems? PCP, Specialist, Home health nurse, Urgent Care, ED, 911  Yes   TCM call completed?  Yes          Yanet Robertson RN    8/9/2021, 14:23 EDT

## 2021-08-10 ENCOUNTER — OFFICE VISIT (OUTPATIENT)
Dept: FAMILY MEDICINE CLINIC | Facility: CLINIC | Age: 76
End: 2021-08-10

## 2021-08-10 VITALS
WEIGHT: 68 LBS | OXYGEN SATURATION: 97 % | HEART RATE: 120 BPM | HEIGHT: 55 IN | SYSTOLIC BLOOD PRESSURE: 130 MMHG | TEMPERATURE: 97.7 F | DIASTOLIC BLOOD PRESSURE: 60 MMHG | BODY MASS INDEX: 15.73 KG/M2

## 2021-08-10 DIAGNOSIS — N17.9 ACUTE KIDNEY INJURY (HCC): ICD-10-CM

## 2021-08-10 DIAGNOSIS — Z09 ENCOUNTER FOR EXAMINATION FOLLOWING TREATMENT AT HOSPITAL: Primary | ICD-10-CM

## 2021-08-10 DIAGNOSIS — D64.9 ACUTE ON CHRONIC ANEMIA: ICD-10-CM

## 2021-08-10 DIAGNOSIS — K21.9 GASTROESOPHAGEAL REFLUX DISEASE WITHOUT ESOPHAGITIS: ICD-10-CM

## 2021-08-10 PROCEDURE — 99495 TRANSJ CARE MGMT MOD F2F 14D: CPT | Performed by: NURSE PRACTITIONER

## 2021-08-10 PROCEDURE — 85025 COMPLETE CBC W/AUTO DIFF WBC: CPT | Performed by: NURSE PRACTITIONER

## 2021-08-10 PROCEDURE — 80053 COMPREHEN METABOLIC PANEL: CPT | Performed by: NURSE PRACTITIONER

## 2021-08-10 PROCEDURE — 85007 BL SMEAR W/DIFF WBC COUNT: CPT | Performed by: NURSE PRACTITIONER

## 2021-08-10 PROCEDURE — 1111F DSCHRG MED/CURRENT MED MERGE: CPT | Performed by: NURSE PRACTITIONER

## 2021-08-10 PROCEDURE — 83540 ASSAY OF IRON: CPT | Performed by: NURSE PRACTITIONER

## 2021-08-10 PROCEDURE — 84466 ASSAY OF TRANSFERRIN: CPT | Performed by: NURSE PRACTITIONER

## 2021-08-10 PROCEDURE — 36415 COLL VENOUS BLD VENIPUNCTURE: CPT | Performed by: NURSE PRACTITIONER

## 2021-08-11 LAB
ALBUMIN SERPL-MCNC: 4.3 G/DL (ref 3.5–5.2)
ALBUMIN/GLOB SERPL: 1.6 G/DL
ALP SERPL-CCNC: 94 U/L (ref 39–117)
ALT SERPL W P-5'-P-CCNC: 18 U/L (ref 1–33)
ANION GAP SERPL CALCULATED.3IONS-SCNC: 12.6 MMOL/L (ref 5–15)
ANISOCYTOSIS BLD QL: ABNORMAL
AST SERPL-CCNC: 24 U/L (ref 1–32)
BASOPHILS # BLD MANUAL: 0.07 10*3/MM3 (ref 0–0.2)
BASOPHILS NFR BLD AUTO: 1 % (ref 0–1.5)
BILIRUB SERPL-MCNC: 0.7 MG/DL (ref 0–1.2)
BUN SERPL-MCNC: 26 MG/DL (ref 8–23)
BUN/CREAT SERPL: 19.5 (ref 7–25)
CALCIUM SPEC-SCNC: 9.4 MG/DL (ref 8.6–10.5)
CHLORIDE SERPL-SCNC: 98 MMOL/L (ref 98–107)
CO2 SERPL-SCNC: 30.4 MMOL/L (ref 22–29)
CREAT SERPL-MCNC: 1.33 MG/DL (ref 0.57–1)
DEPRECATED RDW RBC AUTO: 75.8 FL (ref 37–54)
ERYTHROCYTE [DISTWIDTH] IN BLOOD BY AUTOMATED COUNT: 21.8 % (ref 12.3–15.4)
GFR SERPL CREATININE-BSD FRML MDRD: 39 ML/MIN/1.73
GLOBULIN UR ELPH-MCNC: 2.7 GM/DL
GLUCOSE SERPL-MCNC: 87 MG/DL (ref 65–99)
HCT VFR BLD AUTO: 29.7 % (ref 34–46.6)
HGB BLD-MCNC: 9.2 G/DL (ref 12–15.9)
IRON 24H UR-MRATE: 115 MCG/DL (ref 37–145)
IRON SATN MFR SERPL: 45 % (ref 20–50)
LYMPHOCYTES # BLD MANUAL: 1.3 10*3/MM3 (ref 0.7–3.1)
LYMPHOCYTES NFR BLD MANUAL: 19 % (ref 19.6–45.3)
LYMPHOCYTES NFR BLD MANUAL: 6 % (ref 5–12)
MCH RBC QN AUTO: 30.4 PG (ref 26.6–33)
MCHC RBC AUTO-ENTMCNC: 31 G/DL (ref 31.5–35.7)
MCV RBC AUTO: 98 FL (ref 79–97)
MICROCYTES BLD QL: ABNORMAL
MONOCYTES # BLD AUTO: 0.41 10*3/MM3 (ref 0.1–0.9)
NEUTROPHILS # BLD AUTO: 4.86 10*3/MM3 (ref 1.7–7)
NEUTROPHILS NFR BLD MANUAL: 71 % (ref 42.7–76)
PLAT MORPH BLD: NORMAL
PLATELET # BLD AUTO: 428 10*3/MM3 (ref 140–450)
PMV BLD AUTO: 12 FL (ref 6–12)
POIKILOCYTOSIS BLD QL SMEAR: ABNORMAL
POTASSIUM SERPL-SCNC: 5.8 MMOL/L (ref 3.5–5.2)
PROT SERPL-MCNC: 7 G/DL (ref 6–8.5)
RBC # BLD AUTO: 3.03 10*6/MM3 (ref 3.77–5.28)
SODIUM SERPL-SCNC: 141 MMOL/L (ref 136–145)
TIBC SERPL-MCNC: 256 MCG/DL (ref 298–536)
TRANSFERRIN SERPL-MCNC: 172 MG/DL (ref 200–360)
WBC # BLD AUTO: 6.85 10*3/MM3 (ref 3.4–10.8)
WBC MORPH BLD: NORMAL

## 2021-08-13 NOTE — PROGRESS NOTES
Her blood count is up from 7.1 to 9.2.  Her kidney function is only mildly better.  I have made her a referral to Dr. Brandt please print out today's labs and the labs she had on 8 6.  And send those to his office.  Her iron level is still normal but has gone down.  Continue her iron supplement.

## 2021-08-18 NOTE — ASSESSMENT & PLAN NOTE
Continue Prilosec 20 mg.    Advised to avoid known GI triggers such as spicy foods.  Upright 30 minutes after meals and avoid eating large meals.  Several small meals daily as able to avoid overfilling stomach.

## 2021-08-23 ENCOUNTER — OFFICE VISIT (OUTPATIENT)
Dept: FAMILY MEDICINE CLINIC | Facility: CLINIC | Age: 76
End: 2021-08-23

## 2021-08-23 ENCOUNTER — LAB (OUTPATIENT)
Dept: FAMILY MEDICINE CLINIC | Facility: CLINIC | Age: 76
End: 2021-08-23

## 2021-08-23 VITALS — BODY MASS INDEX: 15.73 KG/M2 | WEIGHT: 68 LBS | HEIGHT: 55 IN

## 2021-08-23 DIAGNOSIS — R50.9 FEVER, UNSPECIFIED FEVER CAUSE: Primary | ICD-10-CM

## 2021-08-23 DIAGNOSIS — R11.0 NAUSEA: ICD-10-CM

## 2021-08-23 PROCEDURE — G0071 COMM SVCS BY RHC/FQHC 5 MIN: HCPCS | Performed by: NURSE PRACTITIONER

## 2021-08-23 RX ORDER — ONDANSETRON 4 MG/1
4 TABLET, FILM COATED ORAL EVERY 8 HOURS PRN
Qty: 30 TABLET | Refills: 0 | Status: SHIPPED | OUTPATIENT
Start: 2021-08-23

## 2021-08-23 NOTE — PROGRESS NOTES
You have chosen to receive care through a telephone visit today. Do you consent to use a telephone visit for your medical care today? Yes    Establish patient makes contact with office of APRN to discuss health conditions.  Patient is due for routine checkup but due to current pandemic is not recommended to present to the office for face-to-face evaluation.      Chief Complaint   Patient presents with   • Fever   • Diarrhea       Brief HPI/ROS obtained as follows:    Covid concern-family is concerned that patient has covid.  Reports she has a fever.  Diarrhea and vomiting.  HH will not come to the house until she has a negative covid test.  She has not had any known exposure.  Symptoms began sometime yesterday.  Has had generalized fatigue for a few days.  No vomiting.   Abdomen tenderness.  No cough or SOA.  No sinus complaints.      The following portions of the patient's history were reviewed and updated as appropriate: CC, ROS, allergies, current medications, past family history, past medical history, past social history, past surgical history and problem list.    Review of Systems   Constitutional: Positive for fatigue and fever. Negative for appetite change.   HENT: Negative for congestion, ear pain, nosebleeds, postnasal drip, rhinorrhea, sore throat, trouble swallowing and voice change.    Eyes: Negative for blurred vision, photophobia and visual disturbance.   Respiratory: Negative for cough, chest tightness, shortness of breath and wheezing.    Cardiovascular: Negative for chest pain and palpitations.   Gastrointestinal: Positive for abdominal pain and diarrhea. Negative for blood in stool, constipation and nausea.   Endocrine: Negative for cold intolerance, heat intolerance and polydipsia.   Genitourinary: Negative for dysuria and hematuria.   Musculoskeletal: Negative for arthralgias, back pain, gait problem and myalgias.   Skin: Negative for color change, pallor and bruise.   Allergic/Immunologic:  "Negative.    Neurological: Negative for dizziness and headache.   Hematological: Negative.    Psychiatric/Behavioral: Negative for sleep disturbance and suicidal ideas. The patient is not nervous/anxious.    All other systems reviewed and are negative.      Assessment     Ht 121.9 cm (47.99\")   Wt 30.8 kg (68 lb)   BMI 20.76 kg/m²     Physical Exam     Patient alert and oriented.  Able to follow conversation without sounding breathless.  No obvious distress.  Thought processes congruent with conversation.  Answers and ask questions without difficulty.      Assessment     Diagnoses and all orders for this visit:    1. Fever, unspecified fever cause (Primary)  -     COVID PRE-OP / PRE-PROCEDURE SCREENING ORDER (NO ISOLATION) - Swab, Nasopharynx    2. Nausea  -     ondansetron (Zofran) 4 MG tablet; Take 1 tablet by mouth Every 8 (Eight) Hours As Needed for Nausea or Vomiting.  Dispense: 30 tablet; Refill: 0        Patient instructed and advised to call if symptoms are increasing or new symptoms occur.    Understands reasons for urgent and emergent care.  Patient (& family) verbalized agreement for treatment plan.   Generalized precautions advised including social distancing, hand washing, cough/sneeze hygiene.     Push fluids for hydration.    PRN Tylenol for fever    RTC PRN 3-5 days for worsening or non resolving symptoms        This visit has been rescheduled as a phone visit to comply with patient safety concerns in accordance with CDC recommendations. Total time of discussion was 10 minutes.        This document has been electronically signed by:  REBEKAH Peacock FNP-C Dragon disclaimer:  Part of this note may be an electronic transcription/translation of spoken language to printed text using the Dragon Dictation System.               "

## 2021-08-24 ENCOUNTER — TELEPHONE (OUTPATIENT)
Dept: FAMILY MEDICINE CLINIC | Facility: CLINIC | Age: 76
End: 2021-08-24

## 2021-08-24 PROCEDURE — U0004 COV-19 TEST NON-CDC HGH THRU: HCPCS | Performed by: NURSE PRACTITIONER

## 2021-08-24 PROCEDURE — U0005 INFEC AGEN DETEC AMPLI PROBE: HCPCS | Performed by: NURSE PRACTITIONER

## 2021-08-24 NOTE — TELEPHONE ENCOUNTER
Caller: EL MARISCAL    Relationship: Emergency Contact    Best call back number: [phone]    Caller requesting test results: EMERGENCY CONTACT    What test was performed: COVID    When was the test performed: YESTERDAY    Where was the test performed: IN OFFICE    Additional notes:

## 2021-08-25 LAB — SARS-COV-2 RNA PNL SPEC NAA+PROBE: NOT DETECTED

## 2021-09-01 ENCOUNTER — OFFICE VISIT (OUTPATIENT)
Dept: GASTROENTEROLOGY | Facility: CLINIC | Age: 76
End: 2021-09-01

## 2021-09-01 VITALS
BODY MASS INDEX: 16.89 KG/M2 | HEIGHT: 55 IN | DIASTOLIC BLOOD PRESSURE: 68 MMHG | SYSTOLIC BLOOD PRESSURE: 171 MMHG | HEART RATE: 114 BPM | WEIGHT: 73 LBS

## 2021-09-01 DIAGNOSIS — Z01.818 PREOPERATIVE CLEARANCE: ICD-10-CM

## 2021-09-01 DIAGNOSIS — R10.13 EPIGASTRIC PAIN: Primary | ICD-10-CM

## 2021-09-01 DIAGNOSIS — D50.0 IRON DEFICIENCY ANEMIA DUE TO CHRONIC BLOOD LOSS: Primary | ICD-10-CM

## 2021-09-01 PROCEDURE — 99204 OFFICE O/P NEW MOD 45 MIN: CPT | Performed by: PHYSICIAN ASSISTANT

## 2021-09-01 NOTE — PROGRESS NOTES
Chief Complaint   Patient presents with   • Anemia       Ale Gupta is a 76 y.o. female who presents to the office today for evaluation of Anemia  .    HPI  Patient presents the clinic today for anemia.  She was recently admitted to the hospital due to an acute kidney injury.  During her hospitalization hemoglobin had dropped down to 6.4 g/dL.  At that time she did have a blood transfusion which brought her levels up to around 8.0 g/dL.  Her primary care started her on iron supplementation which has also increased hemoglobin levels.  She does have an extensive medical history including congestive heart failure, COPD, and issues with her kidneys.  She is currently taking Eliquis 2.5 mg daily and aspirin 80 mg daily.  She has not noticed any bright red blood per rectum or melena in stools.  Review of Systems   Constitutional: Negative for fatigue.   HENT: Negative for trouble swallowing.    Eyes: Negative for pain.   Respiratory: Negative for chest tightness and shortness of breath.    Cardiovascular: Positive for leg swelling.   Gastrointestinal: Positive for abdominal distention, constipation, diarrhea and nausea. Negative for abdominal pain, anal bleeding, blood in stool, rectal pain and vomiting.   Endocrine: Negative for heat intolerance.   Genitourinary: Negative for difficulty urinating.   Musculoskeletal: Positive for back pain.   Skin: Negative for rash.   Neurological: Positive for dizziness, light-headedness and headaches.   Hematological: Bruises/bleeds easily.       ACTIVE PROBLEMS:   Specialty Problems        Gastroenterology Problems    Gastroesophageal reflux disease without esophagitis              PAST MEDICAL HISTORY:  Past Medical History:   Diagnosis Date   • Allergy    • Arthritis    • Asthma    • Congestive heart failure (CHF) (CMS/[facility])    • COPD (chronic obstructive pulmonary disease) (CMS/[facility])    • GERD (gastroesophageal reflux disease)    • Hypertension    • Stroke (CMS/[facility])     TIA        SURGICAL HISTORY:  Past Surgical History:   Procedure Laterality Date   • CATARACT EXTRACTION     • COLONOSCOPY     • ENDOSCOPY     • FRACTURE SURGERY      LEFT ARM    • HERNIA REPAIR     • ORIF ULNA/RADIUS FRACTURES Left 2019    Procedure: ULNA/RADIUS OPEN REDUCTION INTERNAL FIXATION, radius only;  Surgeon: Maverick Stevenson MD;  Location: [facility];  Service: Orthopedics       FAMILY HISTORY:  Family History   Problem Relation Age of Onset   • Heart disease Father    • Heart disease Sister    • Hypertension Sister    • Cancer Brother    • Heart disease Maternal Grandmother    • No Known Problems Maternal Grandfather        SOCIAL HISTORY:  Social History     Tobacco Use   • Smoking status: Former Smoker     Packs/day: 0.25     Years: 22.00     Pack years: 5.50     Types: Cigarettes     Quit date: 5/10/2010     Years since quittin.3   • Smokeless tobacco: Never Used   Substance Use Topics   • Alcohol use: No       CURRENT MEDICATION:    Current Outpatient Medications:   •  albuterol (PROVENTIL) (2.5 MG/3ML) 0.083% nebulizer solution, Take 2.5 mg by nebulization Every 4 (Four) Hours As Needed for Wheezing., Disp: , Rfl:   •  apixaban (ELIQUIS) 2.5 MG tablet tablet, Take 1 tablet by mouth Every 12 (Twelve) Hours for 30 days. Indications: Atrial Fibrillation, Disp: 60 tablet, Rfl: 0  •  budesonide-formoterol (SYMBICORT) 160-4.5 MCG/ACT inhaler, Inhale 2 puffs Daily., Disp: 120 each, Rfl: 2  •  bumetanide (BUMEX) 1 MG tablet, Take 0.5 tablets by mouth Daily for 30 days., Disp: 15 tablet, Rfl: 0  •  docusate sodium (COLACE) 100 MG capsule, Take 100 mg by mouth 2 (Two) Times a Day As Needed for Constipation., Disp: , Rfl:   •  fluticasone (FLONASE) 50 MCG/ACT nasal spray, 2 sprays into the nostril(s) as directed by provider Daily., Disp: , Rfl:   •  metoprolol succinate XL (TOPROL-XL) 100 MG 24 hr tablet, Take 1 tablet by mouth Daily., Disp: 30 tablet, Rfl: 3  •  omeprazole (priLOSEC) 20 MG capsule, Take 20  "mg by mouth Daily., Disp: , Rfl:   •  ondansetron (Zofran) 4 MG tablet, Take 1 tablet by mouth Every 8 (Eight) Hours As Needed for Nausea or Vomiting., Disp: 30 tablet, Rfl: 0  •  potassium chloride (K-DUR,KLOR-CON) 10 MEQ CR tablet, Take 10 mEq by mouth Daily., Disp: , Rfl:   •  polyethylene glycol (MIRALAX) 17 GM/SCOOP powder, Take 17 g by mouth Daily., Disp: 527 g, Rfl: 11    ALLERGIES:  Codeine    VISIT VITALS:  /68 (BP Location: Left arm, Patient Position: Sitting, Cuff Size: Adult)   Pulse 114   Ht 121.9 cm (47.99\")   Wt 33.1 kg (73 lb)   BMI 22.28 kg/m²   Physical Exam  Constitutional:       General: She is not in acute distress.     Appearance: Normal appearance. She is well-developed.   HENT:      Head: Normocephalic and atraumatic.   Eyes:      Pupils: Pupils are equal, round, and reactive to light.   Cardiovascular:      Rate and Rhythm: Normal rate and regular rhythm.      Heart sounds: Normal heart sounds.   Pulmonary:      Effort: Pulmonary effort is normal. No respiratory distress.      Breath sounds: Normal breath sounds. No wheezing, rhonchi or rales.   Abdominal:      General: Abdomen is flat. Bowel sounds are normal. There is no distension.      Palpations: Abdomen is soft. There is no mass.      Tenderness: There is no abdominal tenderness. There is no guarding or rebound.      Hernia: No hernia is present.   Musculoskeletal:         General: No swelling. Normal range of motion.      Cervical back: Normal range of motion and neck supple.      Right lower leg: No edema.      Left lower leg: No edema.   Skin:     General: Skin is warm and dry.   Neurological:      Mental Status: She is alert and oriented to person, place, and time.   Psychiatric:         Attention and Perception: Attention normal.         Mood and Affect: Mood normal.         Speech: Speech normal.         Behavior: Behavior normal. Behavior is cooperative.         Thought Content: Thought content normal. "         Assessment/Plan   I will go ahead and get the patient set up to have an EGD/colonoscopy performed by Dr. Alex.  Due to kidney issues we will be using a MiraLAX colon prep.  Both procedures were thoroughly explained to her and she voiced understanding and agreement to the treatment plan.  We will get her scheduled for the procedure as soon as surgery resumes.   Diagnosis Plan   1. Iron deficiency anemia due to chronic blood loss  Follow Anesthesia Guidelines / Protocol    Obtain Informed Consent    Give Tap Water Enema If Bowel Prep Insufficient    Case Request    Case Request    polyethylene glycol (MIRALAX) 17 GM/SCOOP powder       Return in about 4 weeks (around 9/29/2021).                   This document has been electronically signed by Luis Wilson PA-C  September 2, 2021 12:50 EDT    Part of this note may be an electronic transcription/translation of spoken language to printed text using the Dragon Dictation System.

## 2021-09-02 RX ORDER — POLYETHYLENE GLYCOL 3350 17 G/17G
17 POWDER, FOR SOLUTION ORAL DAILY
Qty: 527 G | Refills: 11 | Status: SHIPPED | OUTPATIENT
Start: 2021-09-02 | End: 2021-09-09 | Stop reason: SDUPTHER

## 2021-09-09 ENCOUNTER — LAB (OUTPATIENT)
Dept: FAMILY MEDICINE CLINIC | Facility: CLINIC | Age: 76
End: 2021-09-09

## 2021-09-09 ENCOUNTER — OFFICE VISIT (OUTPATIENT)
Dept: FAMILY MEDICINE CLINIC | Facility: CLINIC | Age: 76
End: 2021-09-09

## 2021-09-09 VITALS — WEIGHT: 73 LBS | HEIGHT: 55 IN | BODY MASS INDEX: 16.89 KG/M2

## 2021-09-09 DIAGNOSIS — J18.9 PNEUMONIA DUE TO INFECTIOUS ORGANISM, UNSPECIFIED LATERALITY, UNSPECIFIED PART OF LUNG: Primary | ICD-10-CM

## 2021-09-09 DIAGNOSIS — F41.9 ANXIETY: ICD-10-CM

## 2021-09-09 DIAGNOSIS — K59.01 SLOW TRANSIT CONSTIPATION: ICD-10-CM

## 2021-09-09 DIAGNOSIS — Z20.822 EXPOSURE TO COVID-19 VIRUS: ICD-10-CM

## 2021-09-09 PROCEDURE — G2025 DIS SITE TELE SVCS RHC/FQHC: HCPCS | Performed by: NURSE PRACTITIONER

## 2021-09-09 PROCEDURE — U0005 INFEC AGEN DETEC AMPLI PROBE: HCPCS | Performed by: NURSE PRACTITIONER

## 2021-09-09 PROCEDURE — U0004 COV-19 TEST NON-CDC HGH THRU: HCPCS | Performed by: NURSE PRACTITIONER

## 2021-09-09 RX ORDER — POLYETHYLENE GLYCOL 3350 17 G/17G
17 POWDER, FOR SOLUTION ORAL DAILY
Qty: 527 G | Refills: 11 | Status: SHIPPED | OUTPATIENT
Start: 2021-09-09

## 2021-09-09 RX ORDER — PAROXETINE HYDROCHLORIDE 20 MG/1
20 TABLET, FILM COATED ORAL EVERY MORNING
Qty: 30 TABLET | Refills: 2 | Status: SHIPPED | OUTPATIENT
Start: 2021-09-09

## 2021-09-09 NOTE — ASSESSMENT & PLAN NOTE
Continue colace stool softener prn  Instructed patient and sister to take Miralax daily  Refill miralax  Will add senna if miralax not effective

## 2021-09-09 NOTE — ASSESSMENT & PLAN NOTE
Covid test today  Continue antibiotics as prescribed by the hospital  Continue O2 via n/c  Follow up 9/15

## 2021-09-09 NOTE — PROGRESS NOTES
"You have chosen to receive care through a telephone visit today. Do you consent to use a telephone visit for your medical care today? Yes    Establish patient makes contact with office of [facility] to discuss health conditions.  Patient is due for routine checkup but due to current pandemic is not recommended to present to the office for face-to-face evaluation.      Chief Complaint   Patient presents with   • Covid-19 Home Monitoring Video Visit   • Cough       Brief HPI/ROS obtained as follows:    Pneumonia- Patient was discharged from the hospital yesterday with dx of pneumonia. Sister states that she was discharged with 4 new medications but she is unsure of their names, but state they are for the pneumonia. She states that the hospital did not do a covid test, that they were instructed to follow up with her PCP for covid testing. Patient's sister states that pt has a terrible cough. She reports that she is using her oxygen as ordered and doing the breathing treatments which have helped some. She denies any fever.     Anxiety- Sister reports patient is very anxious and has had \"2 panic attacks this morning\". She states that she is \"always like this\" but since going to the hospital and being diagnosed with pneumonia she has been worse. She is currently not prescribed anything for anxiety. Patient is tearful and crying on the phone, stating, \"Am I going to die?\".    Exposure to Covid- Patient's sister is concerned about covid, states that pt was sharing a room with someone who recently just got over covid. Patient has had no fever, but has had cough, shortness of breath, and dx with pneumonia.     Constipation-patient's sister reports pt has had difficulty with her bowels moving. States that she has only been giving her colace, not currently taking miralax. Last BM 9/8/21.    The following portions of the patient's history were reviewed and updated as appropriate: CC, ROS, allergies, current medications, past family " "history, past medical history, past social history, past surgical history and problem list.    Review of Systems   Psychiatric/Behavioral: The patient is nervous/anxious.        Assessment     Ht 121.9 cm (47.99\")   Wt 33.1 kg (73 lb)   BMI 22.29 kg/m²     Physical Exam    Assessment     Diagnoses and all orders for this visit:    1. Pneumonia due to infectious organism, unspecified laterality, unspecified part of lung (Primary)  Assessment & Plan:  Covid test today  Continue antibiotics as prescribed by the hospital  Continue O2 via n/c  Follow up 9/15      2. Exposure to COVID-19 virus  Assessment & Plan:  covid test today      3. Anxiety  Assessment & Plan:  Increased anxiety 2/2 pneumonia, fears of covid  Start paroxetine 20 mg po daily  Will consider adding trazodone prn for increased episodes of anxiety      4. Slow transit constipation  Assessment & Plan:  Continue colace stool softener prn  Instructed patient and sister to take Miralax daily  Refill miralax  Will add senna if miralax not effective        Patient instructed and advised to call if symptoms are increasing or new symptoms occur.    Understands reasons for urgent and emergent care.  Patient (& family) verbalized agreement for treatment plan.   Generalized precautions advised including social distancing, hand washing, cough/sneeze hygiene.       This visit has been rescheduled as a phone visit to comply with patient safety concerns in accordance with CDC recommendations. Total time of discussion was 12 minutes.      This document has been electronically signed by REBEKAH Toro  September 9, 2021 14:37 EDT              "

## 2021-09-09 NOTE — ASSESSMENT & PLAN NOTE
Increased anxiety 2/2 pneumonia, fears of covid  Start paroxetine 20 mg po daily  Will consider adding trazodone prn for increased episodes of anxiety

## 2021-09-10 LAB — SARS-COV-2 RNA PNL SPEC NAA+PROBE: NOT DETECTED

## (undated) DEVICE — HOLDER: Brand: DEROYAL

## (undated) DEVICE — BNDG ELAS CO-FLEX SLF ADHR 4IN5YD LF STRL

## (undated) DEVICE — PK EXTREM UPPR 70

## (undated) DEVICE — DRP C/ARM W/BAND W/CLIPS 41X74IN

## (undated) DEVICE — GLV SURG TRIUMPH ORTHO W/ALOE PF LTX 8 STRL

## (undated) DEVICE — UNDERCAST PADDING: Brand: DEROYAL

## (undated) DEVICE — TP UMB COTN 1/8X36 U12T